# Patient Record
Sex: FEMALE | Race: WHITE | Employment: OTHER | URBAN - METROPOLITAN AREA
[De-identification: names, ages, dates, MRNs, and addresses within clinical notes are randomized per-mention and may not be internally consistent; named-entity substitution may affect disease eponyms.]

---

## 2023-05-06 ENCOUNTER — HOSPITAL ENCOUNTER (INPATIENT)
Facility: HOSPITAL | Age: 83
LOS: 3 days | Discharge: HOME | End: 2023-05-09
Attending: EMERGENCY MEDICINE | Admitting: HOSPITALIST
Payer: MEDICARE

## 2023-05-06 ENCOUNTER — APPOINTMENT (OUTPATIENT)
Facility: HOSPITAL | Age: 83
End: 2023-05-06
Payer: MEDICARE

## 2023-05-06 DIAGNOSIS — J02.9 SORE THROAT: ICD-10-CM

## 2023-05-06 DIAGNOSIS — U07.1 COVID-19: Primary | ICD-10-CM

## 2023-05-06 DIAGNOSIS — E86.0 DEHYDRATION: ICD-10-CM

## 2023-05-06 LAB
ALBUMIN SERPL-MCNC: 3.6 G/DL (ref 3.5–5)
ALBUMIN/GLOB SERPL: 0.9 (ref 1.1–2.2)
ALP SERPL-CCNC: 40 U/L (ref 45–117)
ALT SERPL-CCNC: 27 U/L (ref 12–78)
ANION GAP SERPL CALC-SCNC: 4 MMOL/L (ref 5–15)
APPEARANCE UR: CLEAR
AST SERPL-CCNC: 27 U/L (ref 15–37)
BACTERIA URNS QL MICRO: NEGATIVE /HPF
BASOPHILS # BLD: 0 K/UL (ref 0–0.1)
BASOPHILS NFR BLD: 0 % (ref 0–1)
BILIRUB SERPL-MCNC: 0.5 MG/DL (ref 0.2–1)
BILIRUB UR QL: NEGATIVE
BUN SERPL-MCNC: 24 MG/DL (ref 6–20)
BUN/CREAT SERPL: 20 (ref 12–20)
CALCIUM SERPL-MCNC: 9.6 MG/DL (ref 8.5–10.1)
CHLORIDE SERPL-SCNC: 102 MMOL/L (ref 97–108)
CO2 SERPL-SCNC: 27 MMOL/L (ref 21–32)
COLOR UR: ABNORMAL
COMMENT:: NORMAL
CREAT SERPL-MCNC: 1.2 MG/DL (ref 0.55–1.02)
DIFFERENTIAL METHOD BLD: NORMAL
EOSINOPHIL # BLD: 0 K/UL (ref 0–0.4)
EOSINOPHIL NFR BLD: 0 % (ref 0–7)
EPITH CASTS URNS QL MICRO: ABNORMAL /LPF
ERYTHROCYTE [DISTWIDTH] IN BLOOD BY AUTOMATED COUNT: 12.7 % (ref 11.5–14.5)
GLOBULIN SER CALC-MCNC: 3.9 G/DL (ref 2–4)
GLUCOSE SERPL-MCNC: 92 MG/DL (ref 65–100)
GLUCOSE UR STRIP.AUTO-MCNC: NEGATIVE MG/DL
HCT VFR BLD AUTO: 39.5 % (ref 35–47)
HGB BLD-MCNC: 12.9 G/DL (ref 11.5–16)
HGB UR QL STRIP: ABNORMAL
HYALINE CASTS URNS QL MICRO: ABNORMAL /LPF (ref 0–5)
IMM GRANULOCYTES # BLD AUTO: 0 K/UL (ref 0–0.04)
IMM GRANULOCYTES NFR BLD AUTO: 0 % (ref 0–0.5)
KETONES UR QL STRIP.AUTO: NEGATIVE MG/DL
LEUKOCYTE ESTERASE UR QL STRIP.AUTO: ABNORMAL
LYMPHOCYTES # BLD: 1.2 K/UL (ref 0.8–3.5)
LYMPHOCYTES NFR BLD: 24 % (ref 12–49)
MAGNESIUM SERPL-MCNC: 2.4 MG/DL (ref 1.6–2.4)
MCH RBC QN AUTO: 30.7 PG (ref 26–34)
MCHC RBC AUTO-ENTMCNC: 32.7 G/DL (ref 30–36.5)
MCV RBC AUTO: 94 FL (ref 80–99)
MONOCYTES # BLD: 0.5 K/UL (ref 0–1)
MONOCYTES NFR BLD: 9 % (ref 5–13)
NEUTS SEG # BLD: 3.1 K/UL (ref 1.8–8)
NEUTS SEG NFR BLD: 67 % (ref 32–75)
NITRITE UR QL STRIP.AUTO: NEGATIVE
NRBC # BLD: 0 K/UL (ref 0–0.01)
NRBC BLD-RTO: 0 PER 100 WBC
PH UR STRIP: 6.5 (ref 5–8)
PLATELET # BLD AUTO: 177 K/UL (ref 150–400)
PMV BLD AUTO: 12.1 FL (ref 8.9–12.9)
POTASSIUM SERPL-SCNC: 3.4 MMOL/L (ref 3.5–5.1)
PROT SERPL-MCNC: 7.5 G/DL (ref 6.4–8.2)
PROT UR STRIP-MCNC: ABNORMAL MG/DL
RBC # BLD AUTO: 4.2 M/UL (ref 3.8–5.2)
RBC #/AREA URNS HPF: ABNORMAL /HPF (ref 0–5)
SODIUM SERPL-SCNC: 133 MMOL/L (ref 136–145)
SP GR UR REFRACTOMETRY: 1.02 (ref 1–1.03)
SPECIMEN HOLD: NORMAL
TROPONIN I SERPL HS-MCNC: 7 NG/L (ref 0–37)
UROBILINOGEN UR QL STRIP.AUTO: 0.2 EU/DL (ref 0.2–1)
WBC # BLD AUTO: 4.8 K/UL (ref 3.6–11)
WBC URNS QL MICRO: ABNORMAL /HPF (ref 0–4)

## 2023-05-06 PROCEDURE — 99285 EMERGENCY DEPT VISIT HI MDM: CPT

## 2023-05-06 PROCEDURE — 81001 URINALYSIS AUTO W/SCOPE: CPT

## 2023-05-06 PROCEDURE — 6370000000 HC RX 637 (ALT 250 FOR IP): Performed by: EMERGENCY MEDICINE

## 2023-05-06 PROCEDURE — 2580000003 HC RX 258: Performed by: HOSPITALIST

## 2023-05-06 PROCEDURE — 36415 COLL VENOUS BLD VENIPUNCTURE: CPT

## 2023-05-06 PROCEDURE — 1100000000 HC RM PRIVATE

## 2023-05-06 PROCEDURE — 83735 ASSAY OF MAGNESIUM: CPT

## 2023-05-06 PROCEDURE — 6360000002 HC RX W HCPCS: Performed by: HOSPITALIST

## 2023-05-06 PROCEDURE — 85025 COMPLETE CBC W/AUTO DIFF WBC: CPT

## 2023-05-06 PROCEDURE — 80053 COMPREHEN METABOLIC PANEL: CPT

## 2023-05-06 PROCEDURE — 71046 X-RAY EXAM CHEST 2 VIEWS: CPT

## 2023-05-06 PROCEDURE — 94762 N-INVAS EAR/PLS OXIMTRY CONT: CPT

## 2023-05-06 PROCEDURE — 2580000003 HC RX 258: Performed by: EMERGENCY MEDICINE

## 2023-05-06 PROCEDURE — 84484 ASSAY OF TROPONIN QUANT: CPT

## 2023-05-06 RX ORDER — SODIUM CHLORIDE 9 MG/ML
INJECTION, SOLUTION INTRAVENOUS ONCE
Status: COMPLETED | OUTPATIENT
Start: 2023-05-06 | End: 2023-05-06

## 2023-05-06 RX ORDER — POLYETHYLENE GLYCOL 3350 17 G/17G
17 POWDER, FOR SOLUTION ORAL DAILY PRN
Status: DISCONTINUED | OUTPATIENT
Start: 2023-05-06 | End: 2023-05-09 | Stop reason: HOSPADM

## 2023-05-06 RX ORDER — TRAMADOL HYDROCHLORIDE 50 MG/1
50 TABLET ORAL EVERY 6 HOURS PRN
Status: DISCONTINUED | OUTPATIENT
Start: 2023-05-06 | End: 2023-05-09 | Stop reason: HOSPADM

## 2023-05-06 RX ORDER — SODIUM CHLORIDE 9 MG/ML
INJECTION, SOLUTION INTRAVENOUS PRN
Status: DISCONTINUED | OUTPATIENT
Start: 2023-05-06 | End: 2023-05-09 | Stop reason: HOSPADM

## 2023-05-06 RX ORDER — ONDANSETRON 4 MG/1
4 TABLET, ORALLY DISINTEGRATING ORAL EVERY 8 HOURS PRN
Status: DISCONTINUED | OUTPATIENT
Start: 2023-05-06 | End: 2023-05-09 | Stop reason: HOSPADM

## 2023-05-06 RX ORDER — ACETAMINOPHEN 650 MG/1
650 SUPPOSITORY RECTAL EVERY 6 HOURS PRN
Status: DISCONTINUED | OUTPATIENT
Start: 2023-05-06 | End: 2023-05-09 | Stop reason: HOSPADM

## 2023-05-06 RX ORDER — ONDANSETRON 2 MG/ML
4 INJECTION INTRAMUSCULAR; INTRAVENOUS EVERY 6 HOURS PRN
Status: DISCONTINUED | OUTPATIENT
Start: 2023-05-06 | End: 2023-05-09 | Stop reason: HOSPADM

## 2023-05-06 RX ORDER — ACETAMINOPHEN 325 MG/1
650 TABLET ORAL EVERY 6 HOURS PRN
Status: DISCONTINUED | OUTPATIENT
Start: 2023-05-06 | End: 2023-05-09 | Stop reason: HOSPADM

## 2023-05-06 RX ORDER — LEVOTHYROXINE SODIUM 0.05 MG/1
25 TABLET ORAL EVERY OTHER DAY
Status: DISCONTINUED | OUTPATIENT
Start: 2023-05-07 | End: 2023-05-09 | Stop reason: HOSPADM

## 2023-05-06 RX ORDER — ACETAMINOPHEN 325 MG/1
650 TABLET ORAL EVERY 4 HOURS PRN
Status: DISCONTINUED | OUTPATIENT
Start: 2023-05-06 | End: 2023-05-08 | Stop reason: SDUPTHER

## 2023-05-06 RX ORDER — HEPARIN SODIUM 5000 [USP'U]/ML
5000 INJECTION, SOLUTION INTRAVENOUS; SUBCUTANEOUS 2 TIMES DAILY
Status: DISCONTINUED | OUTPATIENT
Start: 2023-05-06 | End: 2023-05-09 | Stop reason: HOSPADM

## 2023-05-06 RX ORDER — LEVOTHYROXINE SODIUM 0.05 MG/1
50 TABLET ORAL EVERY OTHER DAY
Status: DISCONTINUED | OUTPATIENT
Start: 2023-05-08 | End: 2023-05-09 | Stop reason: HOSPADM

## 2023-05-06 RX ORDER — SODIUM CHLORIDE 0.9 % (FLUSH) 0.9 %
5-40 SYRINGE (ML) INJECTION EVERY 12 HOURS SCHEDULED
Status: DISCONTINUED | OUTPATIENT
Start: 2023-05-06 | End: 2023-05-09 | Stop reason: HOSPADM

## 2023-05-06 RX ORDER — SODIUM CHLORIDE AND POTASSIUM CHLORIDE 150; 900 MG/100ML; MG/100ML
INJECTION, SOLUTION INTRAVENOUS CONTINUOUS
Status: DISPENSED | OUTPATIENT
Start: 2023-05-06 | End: 2023-05-07

## 2023-05-06 RX ORDER — SODIUM CHLORIDE 0.9 % (FLUSH) 0.9 %
5-40 SYRINGE (ML) INJECTION PRN
Status: DISCONTINUED | OUTPATIENT
Start: 2023-05-06 | End: 2023-05-09 | Stop reason: HOSPADM

## 2023-05-06 RX ADMIN — ACETAMINOPHEN 650 MG: 325 TABLET ORAL at 19:48

## 2023-05-06 RX ADMIN — SODIUM CHLORIDE: 9 INJECTION, SOLUTION INTRAVENOUS at 12:10

## 2023-05-06 RX ADMIN — POTASSIUM CHLORIDE AND SODIUM CHLORIDE: 900; 150 INJECTION, SOLUTION INTRAVENOUS at 17:57

## 2023-05-06 RX ADMIN — Medication 10 ML: at 21:01

## 2023-05-06 ASSESSMENT — PAIN SCALES - GENERAL: PAINLEVEL_OUTOF10: 6

## 2023-05-06 ASSESSMENT — PAIN DESCRIPTION - LOCATION: LOCATION: THROAT

## 2023-05-07 LAB
ANION GAP SERPL CALC-SCNC: 3 MMOL/L (ref 5–15)
BUN SERPL-MCNC: 18 MG/DL (ref 6–20)
BUN/CREAT SERPL: 17 (ref 12–20)
CALCIUM SERPL-MCNC: 8.3 MG/DL (ref 8.5–10.1)
CHLORIDE SERPL-SCNC: 109 MMOL/L (ref 97–108)
CO2 SERPL-SCNC: 24 MMOL/L (ref 21–32)
CREAT SERPL-MCNC: 1.09 MG/DL (ref 0.55–1.02)
ERYTHROCYTE [DISTWIDTH] IN BLOOD BY AUTOMATED COUNT: 12.7 % (ref 11.5–14.5)
GLUCOSE SERPL-MCNC: 88 MG/DL (ref 65–100)
HCT VFR BLD AUTO: 35.5 % (ref 35–47)
HGB BLD-MCNC: 11.7 G/DL (ref 11.5–16)
MAGNESIUM SERPL-MCNC: 1.8 MG/DL (ref 1.6–2.4)
MCH RBC QN AUTO: 31 PG (ref 26–34)
MCHC RBC AUTO-ENTMCNC: 33 G/DL (ref 30–36.5)
MCV RBC AUTO: 93.9 FL (ref 80–99)
NRBC # BLD: 0 K/UL (ref 0–0.01)
NRBC BLD-RTO: 0 PER 100 WBC
PLATELET # BLD AUTO: 173 K/UL (ref 150–400)
PMV BLD AUTO: 11.8 FL (ref 8.9–12.9)
POTASSIUM SERPL-SCNC: 3.7 MMOL/L (ref 3.5–5.1)
RBC # BLD AUTO: 3.78 M/UL (ref 3.8–5.2)
SODIUM SERPL-SCNC: 136 MMOL/L (ref 136–145)
WBC # BLD AUTO: 3.9 K/UL (ref 3.6–11)

## 2023-05-07 PROCEDURE — 83735 ASSAY OF MAGNESIUM: CPT

## 2023-05-07 PROCEDURE — 6360000002 HC RX W HCPCS: Performed by: HOSPITALIST

## 2023-05-07 PROCEDURE — 1100000000 HC RM PRIVATE

## 2023-05-07 PROCEDURE — 80048 BASIC METABOLIC PNL TOTAL CA: CPT

## 2023-05-07 PROCEDURE — 36415 COLL VENOUS BLD VENIPUNCTURE: CPT

## 2023-05-07 PROCEDURE — 6370000000 HC RX 637 (ALT 250 FOR IP): Performed by: HOSPITALIST

## 2023-05-07 PROCEDURE — 2580000003 HC RX 258: Performed by: HOSPITALIST

## 2023-05-07 PROCEDURE — 85027 COMPLETE CBC AUTOMATED: CPT

## 2023-05-07 RX ORDER — SODIUM CHLORIDE AND POTASSIUM CHLORIDE 150; 900 MG/100ML; MG/100ML
INJECTION, SOLUTION INTRAVENOUS CONTINUOUS
Status: DISPENSED | OUTPATIENT
Start: 2023-05-07 | End: 2023-05-08

## 2023-05-07 RX ORDER — NIRMATRELVIR AND RITONAVIR 150-100 MG
2 KIT ORAL EVERY 12 HOURS
COMMUNITY

## 2023-05-07 RX ADMIN — POTASSIUM CHLORIDE AND SODIUM CHLORIDE: 900; 150 INJECTION, SOLUTION INTRAVENOUS at 04:43

## 2023-05-07 RX ADMIN — LEVOTHYROXINE SODIUM 25 MCG: 0.05 TABLET ORAL at 08:32

## 2023-05-07 RX ADMIN — POTASSIUM CHLORIDE AND SODIUM CHLORIDE: 900; 150 INJECTION, SOLUTION INTRAVENOUS at 15:10

## 2023-05-07 RX ADMIN — POTASSIUM CHLORIDE AND SODIUM CHLORIDE: 900; 150 INJECTION, SOLUTION INTRAVENOUS at 17:43

## 2023-05-07 RX ADMIN — Medication 10 ML: at 22:22

## 2023-05-08 LAB
EKG ATRIAL RATE: 59 BPM
EKG DIAGNOSIS: NORMAL
EKG P AXIS: 81 DEGREES
EKG P-R INTERVAL: 122 MS
EKG Q-T INTERVAL: 430 MS
EKG QRS DURATION: 74 MS
EKG QTC CALCULATION (BAZETT): 425 MS
EKG R AXIS: 78 DEGREES
EKG T AXIS: 74 DEGREES
EKG VENTRICULAR RATE: 59 BPM

## 2023-05-08 PROCEDURE — 1100000000 HC RM PRIVATE

## 2023-05-08 PROCEDURE — 6360000002 HC RX W HCPCS: Performed by: HOSPITALIST

## 2023-05-08 PROCEDURE — 6370000000 HC RX 637 (ALT 250 FOR IP): Performed by: HOSPITALIST

## 2023-05-08 PROCEDURE — 97161 PT EVAL LOW COMPLEX 20 MIN: CPT

## 2023-05-08 PROCEDURE — 2580000003 HC RX 258: Performed by: HOSPITALIST

## 2023-05-08 RX ORDER — SODIUM CHLORIDE AND POTASSIUM CHLORIDE 150; 900 MG/100ML; MG/100ML
INJECTION, SOLUTION INTRAVENOUS CONTINUOUS
Status: DISCONTINUED | OUTPATIENT
Start: 2023-05-08 | End: 2023-05-09 | Stop reason: HOSPADM

## 2023-05-08 RX ADMIN — POTASSIUM CHLORIDE AND SODIUM CHLORIDE: 900; 150 INJECTION, SOLUTION INTRAVENOUS at 07:03

## 2023-05-08 RX ADMIN — POTASSIUM CHLORIDE AND SODIUM CHLORIDE: 900; 150 INJECTION, SOLUTION INTRAVENOUS at 20:23

## 2023-05-08 RX ADMIN — Medication 10 ML: at 20:27

## 2023-05-08 RX ADMIN — LEVOTHYROXINE SODIUM 50 MCG: 0.05 TABLET ORAL at 09:24

## 2023-05-08 NOTE — PROGRESS NOTES
Comprehensive Nutrition Assessment    Type and Reason for Visit:  Initial (low BMI)    Nutrition Recommendations/Plan:   Continue regular diet  Refused ons- added high protein snacks      Malnutrition Assessment:  Malnutrition Status:  Severe malnutrition (05/08/23 1329)    Context:  Acute Illness     Findings of the 6 clinical characteristics of malnutrition:  Energy Intake:  75% or less of estimated energy requirements for 7 or more days  Weight Loss:  Greater than 5% over 1 month     Body Fat Loss: Moderate body fat loss Orbital, Triceps   Muscle Mass Loss: Moderate muscle mass loss Temples (temporalis), Calf (gastrocnemius)  Fluid Accumulation:  No significant fluid accumulation     Strength:  Not Performed    Nutrition Assessment:    PMHx:  s/p total colectomy with ileostomy    80 y.o. female admitted with dehydration and COVID 19. On IVF. Visited pt at bedside for low BMI. She stated UBW is ~82#. Current wt 75#. She is aware of acute weight loss over the last week or so d/t poor appetite from COVID 19 infection. Wt loss is considered significant for time frame. She has very little fat or muscle mass. Meets ASPEN criteria for severe acute malnutrition tho suspect her malnutrition is also chronic in nature. No n/v/c/d. No problems with chewing/swallowing. On a regular diet. We talked about increased nutrient needs in context of low BMI and recent weight loss and addition of snacks between meals and before bedtime, all with protein and fat. She does not like ensure or other ons but is agreeable to receive yogurt and milk on her trays with tuna salad as a snack. Labs reviewed. Medications:  Synthroid    Nutrition Related Findings:      Wound Type: None     Edema: No data recorded      Current Nutrition Intake & Therapies:    Average Meal Intake: 51-75%  Average Supplements Intake: None Ordered  ADULT DIET; Regular  DIET ONE TIME MESSAGE;     Anthropometric Measures:  Height: 5' (152.4 cm)  Ideal Body

## 2023-05-08 NOTE — PROGRESS NOTES
Physical Therapy  PHYSICAL THERAPY EVALUATION/DISCHARGE    Patient: Marianela Naidu (2 y.o. female)  Date: 5/8/2023  Primary Diagnosis: Dehydration [E86.0]  Sore throat [J02.9]  COVID-19 virus infection [U07.1]  COVID-19 [U07.1]       Precautions:  ,  ,  ,  ,  ,  ,  ,        ASSESSMENT AND RECOMMENDATIONS:  Based on the objective data below, the patient is independent with all mobility. She demonstrates good balance, strength, ROM and gait. She did request recommendations for activity and educated her on mobilizing frequently during the day and doing her standing exercises she does at home. She states no issues with pain or breathing and has been mobilizing on her own managing IV pole. At this time, no needs at discharge. Functional Outcome Measure: The patient scored 24/24 on the [facility] outcome measure which is indicative of no needs at discharge. Patient does not require further skilled physical therapy intervention at this level of care. PLAN :  Recommendation for discharge: (in order for the patient to meet his/her long term goals): No skilled physical therapy    Other factors to consider for discharge: lives alone    IF patient discharges home will need the following DME: none       SUBJECTIVE:   Patient stated I am very active. I do yoga, palates. Becky Erie    OBJECTIVE DATA SUMMARY:   History reviewed. No pertinent past medical history.   Past Surgical History:   Procedure Laterality Date    COLECTOMY      JOINT REPLACEMENT      2019       Home Situation:  Social/Functional History  Lives With: Alone  Type of Home: House  Home Layout: Two level  Home Access: Stairs to enter with rails  Entrance Stairs - Number of Steps: 1  Bathroom Shower/Tub: Walk-in shower  Active : Yes  Mode of Transportation: Car  Occupation: Retired  Critical Behavior:  Orientation  Overall Orientation Status: Within Normal Limits  Orientation Level: Oriented X4  Cognition  Overall Cognitive Status: WNL    Hearing:

## 2023-05-08 NOTE — PLAN OF CARE
Problem: Pain  Goal: Verbalizes/displays adequate comfort level or baseline comfort level  Outcome: Progressing  Flowsheets (Taken 5/8/2023 0044)  Verbalizes/displays adequate comfort level or baseline comfort level:   Encourage patient to monitor pain and request assistance   Assess pain using appropriate pain scale     Problem: Safety - Adult  Goal: Free from fall injury  Outcome: Progressing

## 2023-05-08 NOTE — PROGRESS NOTES
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 She was diagnosed with COVID-19 4 days ago. Her symptoms have been severe myalgias and fatigue, followed by sore throat. Her PO intake has been poor and she feels dehydrated. She cannot take much fluids. She denies abdominal pain, dyspnea, n/v. She has a history of UC s/p total colectomy and an ileostomy and notes that she is very prone to dehydration. 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  -Progressing well, BP a little bit lower today than yesterday. Would like to continue IV hydration overnight  -PT evaluated, no therapy needs. Assessment & Plan:     COVID-19 infection fairly asymptomatic without hypoxia, normal chest x-ray  Dehydration weakness due to the acute viral illness  -Continue IV fluids. Advance diet as tolerated. Home Paxlovid reordered per patient request.  -No indication for COVID-specific therapy  -s/s of pharyngitis monitor for evidence of superimposed bacterial infection     Mild hyponatremia hypokalemia, replaced and corrected. Hypothyroidism:  -continue home synthroid     Continue IV hydration tonight, anticipate discharge tomorrow. Code status: Full code  Prophylaxis: Heparin  Care Plan discussed with: Patient  Anticipated Disposition: Home tomorrow             Review of Systems:   Pertinent items are noted in HPI. Vital Signs:    Last 24hrs VS reviewed since prior progress note.  Most recent are:  Vitals:    23 1430   BP: (!) 97/57   Pulse: 60   Resp: 16   Temp: 97.6 °F (36.4 °C)   SpO2: 98%       No intake or output data in the 24 hours

## 2023-05-09 VITALS
OXYGEN SATURATION: 97 % | SYSTOLIC BLOOD PRESSURE: 113 MMHG | BODY MASS INDEX: 14.76 KG/M2 | WEIGHT: 75.18 LBS | DIASTOLIC BLOOD PRESSURE: 69 MMHG | HEIGHT: 60 IN | TEMPERATURE: 98.5 F | HEART RATE: 60 BPM | RESPIRATION RATE: 16 BRPM

## 2023-05-09 LAB
ANION GAP SERPL CALC-SCNC: 2 MMOL/L (ref 5–15)
BUN SERPL-MCNC: 19 MG/DL (ref 6–20)
BUN/CREAT SERPL: 20 (ref 12–20)
CALCIUM SERPL-MCNC: 8.1 MG/DL (ref 8.5–10.1)
CHLORIDE SERPL-SCNC: 116 MMOL/L (ref 97–108)
CO2 SERPL-SCNC: 22 MMOL/L (ref 21–32)
CREAT SERPL-MCNC: 0.94 MG/DL (ref 0.55–1.02)
GLUCOSE SERPL-MCNC: 87 MG/DL (ref 65–100)
POTASSIUM SERPL-SCNC: 4.1 MMOL/L (ref 3.5–5.1)
SODIUM SERPL-SCNC: 140 MMOL/L (ref 136–145)

## 2023-05-09 PROCEDURE — 80048 BASIC METABOLIC PNL TOTAL CA: CPT

## 2023-05-09 PROCEDURE — 6370000000 HC RX 637 (ALT 250 FOR IP): Performed by: HOSPITALIST

## 2023-05-09 PROCEDURE — 36415 COLL VENOUS BLD VENIPUNCTURE: CPT

## 2023-05-09 RX ORDER — LEVOTHYROXINE SODIUM 25 MCG
TABLET ORAL
COMMUNITY
Start: 2023-03-02

## 2023-05-09 RX ORDER — SIMVASTATIN 10 MG
10 TABLET ORAL DAILY
COMMUNITY
Start: 2023-04-24

## 2023-05-09 RX ADMIN — LEVOTHYROXINE SODIUM 25 MCG: 0.05 TABLET ORAL at 08:58

## 2023-05-09 NOTE — DISCHARGE INSTRUCTIONS
SNF/Inpatient Rehab/LTAC    Independent/assisted living    Hospice    Other:         Signed:    Tyrone Harp MD  5/9/2023  11:30 AM

## 2023-05-09 NOTE — DISCHARGE SUMMARY
Discharge Summary       PATIENT ID: Keshawn Gallegos  MRN: [id number]   YOB: 1940    DATE OF ADMISSION: 5/6/2023 11:32 AM    DATE OF DISCHARGE: 05/09/23     PRIMARY CARE PROVIDER: Tania Solis MD     ATTENDING PHYSICIAN: Newton Kilpatrick MD    DISCHARGING PROVIDER: Newton Kilpatrick MD    To contact this individual call [phone] and ask the  to page. If unavailable ask to be transferred the Adult Hospitalist Department. CONSULTATIONS: IP CONSULT TO PHYSICAL THERAPY    PROCEDURES/SURGERIES: * No surgery found *     [address] COURSE:   COVID-19 infection fairly asymptomatic without hypoxia, normal chest x-ray  Dehydration weakness due to the acute viral illness  -Improved with IV fluids.  -No indication for COVID-specific therapy. She was on Paxlovid PTA, wanted to continue.  -s/s of pharyngitis monitor for evidence of superimposed bacterial infection     Mild hyponatremia hypokalemia, replaced and corrected. Hypothyroidism:  -continue home synthroid    Severe malnutrition. Seen by nutritionist       Discharged home. No therapy needs          PENDING TEST RESULTS:   At the time of discharge the following test results are still pending: None    FOLLOW UP APPOINTMENTS:    No follow-ups on file. ADDITIONAL CARE RECOMMENDATIONS:     DIET: regular diet    ACTIVITY: activity as tolerated    WOUND CARE: Not applicable    EQUIPMENT needed: N/A      DISCHARGE MEDICATIONS:     Medication List        CONTINUE taking these medications      paxlovid (150/100) 10 x 150 MG & 10 x 100MG Tbpk  Generic drug: nirmatrelvir/ritonavir     Synthroid 25 MCG tablet  Generic drug: levothyroxine            ASK your doctor about these medications      simvastatin 10 MG tablet  Commonly known as: ZOCOR                NOTIFY YOUR PHYSICIAN FOR ANY OF THE FOLLOWING:   Fever over 101 degrees for 24 hours.    Chest pain, shortness of breath, fever, chills, nausea, vomiting, diarrhea, change in

## 2023-05-09 NOTE — PROGRESS NOTES
Occupational Therapy:  05/09/2023    OT order received and acknowledged. Chart reviewed, in preparation for OT eval, spoke with PT who stated pt is independent and at baseline. No OT needs at this time. If status of pt changes please re consult.      Thank You,  Boo Mclain OTD, OTR/L

## 2023-05-11 NOTE — PROGRESS NOTES
Physician Progress Note      PATIENT:               Christopher Beckford  CSN #:                  [id number]  :                       1940  ADMIT DATE:       2023 11:32 AM  DISCH DATE:        2023 1:37 PM  RESPONDING  PROVIDER #:        Tonny Fernandes MD          QUERY TEXT:    Patient admitted with Covid-19 and dehydration. Noted to have documentation of   weight loss, dietician assessment with moderate body fat and muscle mass   loss. If possible, please document in progress notes and discharge summary if   you are evaluating and /or treating any of the following: The medical record reflects the following:  Risk Factors: elderly, poor appetite    Clinical Indicators:  BMI 14.7  RD Malnutrition Assessment:  Malnutrition Status:  Severe malnutrition (23 1329)  Context:  Acute Illness  Findings of the 6 clinical characteristics of malnutrition:  Energy Intake:  75% or less of estimated energy requirements for 7 or more   days  Weight Loss:  Greater than 5% over 1 month  Body Fat Loss: Moderate body fat loss Orbital, Triceps  Muscle Mass Loss: Moderate muscle mass loss Temples (temporalis), Calf   (gastrocnemius)  Fluid Accumulation:  No significant fluid accumulation   Strength:  Not Performed  She is aware of acute weight loss over the last week or so d/t poor appetite   from COVID 19 infection. Wt loss is considered significant for time frame. She   has very little fat or muscle mass  Nutrition Diagnosis:  ? Severe malnutrition, In context of acute illness or injury related to   inadequate protein-energy intake as evidenced by Criteria as identified in   malnutrition assessment    Treatment: cont regular diet, high protein snacks added- pt refuses ONS, RD to   continue to monitor    ASPEN Criteria:    https://aspenjournals. onlinelibrary. denney. com/doi/full/10.1177/067682859023725  5    Thank you,  Taylor Oropeza RN  Clinical Documentation  [phone], or via [employer] [facility] Se

## 2023-07-13 PROBLEM — N18.32 STAGE 3B CHRONIC KIDNEY DISEASE (HCC): Status: ACTIVE | Noted: 2023-07-13

## 2023-07-13 PROBLEM — E86.0 DEHYDRATION: Status: ACTIVE | Noted: 2023-07-13

## 2023-07-13 RX ORDER — HEPARIN 100 UNIT/ML
500 SYRINGE INTRAVENOUS PRN
Status: CANCELLED | OUTPATIENT
Start: 2023-07-14

## 2023-07-13 RX ORDER — SODIUM CHLORIDE 9 MG/ML
5-250 INJECTION, SOLUTION INTRAVENOUS PRN
Status: CANCELLED | OUTPATIENT
Start: 2023-07-14

## 2023-07-14 ENCOUNTER — HOSPITAL ENCOUNTER (OUTPATIENT)
Facility: HOSPITAL | Age: 83
Setting detail: INFUSION SERIES
End: 2023-07-14
Payer: MEDICARE

## 2023-07-14 VITALS
RESPIRATION RATE: 16 BRPM | DIASTOLIC BLOOD PRESSURE: 59 MMHG | HEIGHT: 61 IN | TEMPERATURE: 97.7 F | WEIGHT: 81.9 LBS | SYSTOLIC BLOOD PRESSURE: 125 MMHG | OXYGEN SATURATION: 100 % | HEART RATE: 59 BPM | BODY MASS INDEX: 15.46 KG/M2

## 2023-07-14 DIAGNOSIS — E86.0 DEHYDRATION: Primary | ICD-10-CM

## 2023-07-14 DIAGNOSIS — N18.32 STAGE 3B CHRONIC KIDNEY DISEASE (HCC): ICD-10-CM

## 2023-07-14 PROBLEM — Z96.659: Status: ACTIVE | Noted: 2020-12-22

## 2023-07-14 PROBLEM — F32.A DEPRESSION: Status: ACTIVE | Noted: 2019-11-07

## 2023-07-14 PROBLEM — R92.2 DENSE BREAST: Status: ACTIVE | Noted: 2017-11-21

## 2023-07-14 PROBLEM — E03.9 HYPOTHYROIDISM: Status: ACTIVE | Noted: 2019-11-07

## 2023-07-14 PROBLEM — T84.9XXA: Status: ACTIVE | Noted: 2020-12-22

## 2023-07-14 PROBLEM — M17.11 PRIMARY OSTEOARTHRITIS OF RIGHT KNEE: Status: ACTIVE | Noted: 2019-11-07

## 2023-07-14 PROBLEM — K51.90 ULCERATIVE COLITIS (HCC): Status: ACTIVE | Noted: 2022-07-03

## 2023-07-14 PROBLEM — M81.0 OSTEOPOROSIS: Status: ACTIVE | Noted: 2023-07-14

## 2023-07-14 PROBLEM — J30.9 ALLERGIC RHINITIS: Status: ACTIVE | Noted: 2023-07-14

## 2023-07-14 PROBLEM — R63.6 UNDERWEIGHT: Status: ACTIVE | Noted: 2020-12-20

## 2023-07-14 PROBLEM — Z96.651 STATUS POST TOTAL RIGHT KNEE REPLACEMENT: Status: ACTIVE | Noted: 2019-12-31

## 2023-07-14 PROBLEM — K91.850 ILEAL POUCHITIS (HCC): Status: ACTIVE | Noted: 2020-12-22

## 2023-07-14 PROBLEM — N28.1 RENAL CYST, ACQUIRED, RIGHT: Status: ACTIVE | Noted: 2020-12-20

## 2023-07-14 PROBLEM — F41.1 GENERALIZED ANXIETY DISORDER: Status: ACTIVE | Noted: 2023-04-24

## 2023-07-14 PROBLEM — R92.30 DENSE BREAST: Status: ACTIVE | Noted: 2017-11-21

## 2023-07-14 PROBLEM — I95.1 ORTHOSTASIS: Status: ACTIVE | Noted: 2021-01-26

## 2023-07-14 PROBLEM — I49.3 PVC'S (PREMATURE VENTRICULAR CONTRACTIONS): Status: ACTIVE | Noted: 2021-04-07

## 2023-07-14 PROBLEM — E78.5 HYPERLIPIDEMIA: Status: ACTIVE | Noted: 2023-07-14

## 2023-07-14 PROBLEM — R31.29 MICROSCOPIC HEMATURIA: Status: ACTIVE | Noted: 2023-07-14

## 2023-07-14 PROBLEM — G47.9 SLEEP DISTURBANCE: Status: ACTIVE | Noted: 2019-11-20

## 2023-07-14 PROBLEM — F41.1 ANXIETY STATE: Status: ACTIVE | Noted: 2020-12-20

## 2023-07-14 PROCEDURE — 96360 HYDRATION IV INFUSION INIT: CPT

## 2023-07-14 PROCEDURE — 2580000003 HC RX 258: Performed by: INTERNAL MEDICINE

## 2023-07-14 PROCEDURE — 96361 HYDRATE IV INFUSION ADD-ON: CPT

## 2023-07-14 RX ORDER — ESCITALOPRAM OXALATE 20 MG/1
TABLET ORAL
COMMUNITY
Start: 2023-04-24

## 2023-07-14 RX ORDER — DEXTROSE AND SODIUM CHLORIDE 5; .45 G/100ML; G/100ML
1000 INJECTION, SOLUTION INTRAVENOUS ONCE
Status: COMPLETED | OUTPATIENT
Start: 2023-07-14 | End: 2023-07-14

## 2023-07-14 RX ORDER — HEPARIN 100 UNIT/ML
500 SYRINGE INTRAVENOUS PRN
Status: CANCELLED | OUTPATIENT
Start: 2023-07-28

## 2023-07-14 RX ORDER — SODIUM CHLORIDE 0.9 % (FLUSH) 0.9 %
5-40 SYRINGE (ML) INJECTION PRN
Status: DISCONTINUED | OUTPATIENT
Start: 2023-07-14 | End: 2023-07-15 | Stop reason: HOSPADM

## 2023-07-14 RX ORDER — CETIRIZINE HYDROCHLORIDE 10 MG/1
10 TABLET ORAL DAILY PRN
COMMUNITY

## 2023-07-14 RX ORDER — CHOLESTYRAMINE 4 G/9G
POWDER, FOR SUSPENSION ORAL
COMMUNITY
Start: 2023-07-11

## 2023-07-14 RX ORDER — SODIUM CHLORIDE 0.9 % (FLUSH) 0.9 %
5-40 SYRINGE (ML) INJECTION PRN
Status: CANCELLED | OUTPATIENT
Start: 2023-07-28

## 2023-07-14 RX ORDER — DENOSUMAB 60 MG/ML
INJECTION SUBCUTANEOUS
COMMUNITY
Start: 2023-03-24

## 2023-07-14 RX ORDER — LORAZEPAM 0.5 MG/1
TABLET ORAL
COMMUNITY
Start: 2021-06-07

## 2023-07-14 RX ORDER — ESTRADIOL 0.1 MG/G
CREAM VAGINAL
COMMUNITY

## 2023-07-14 RX ORDER — SODIUM CHLORIDE 9 MG/ML
5-250 INJECTION, SOLUTION INTRAVENOUS PRN
Status: CANCELLED | OUTPATIENT
Start: 2023-07-28

## 2023-07-14 RX ORDER — DEXTROSE AND SODIUM CHLORIDE 5; .45 G/100ML; G/100ML
1000 INJECTION, SOLUTION INTRAVENOUS ONCE
Status: CANCELLED
Start: 2023-07-28 | End: 2023-07-28

## 2023-07-14 RX ADMIN — Medication 10 ML: at 09:35

## 2023-07-14 RX ADMIN — DEXTROSE AND SODIUM CHLORIDE 1000 ML: 5; 450 INJECTION, SOLUTION INTRAVENOUS at 09:36

## 2023-07-14 RX ADMIN — Medication 10 ML: at 13:38

## 2023-07-14 NOTE — DISCHARGE INSTRUCTIONS
Dehydration: Care Instructions  Your Care Instructions  Dehydration happens when your body loses too much fluid. This might happen when you do not drink enough water or you lose large amounts of fluids from your body because of diarrhea, vomiting, or sweating. Severe dehydration can be life-threatening. Water and minerals called electrolytes help put your body fluids back in balance. Learn the early signs of fluid loss, and drink more fluids to prevent dehydration. Follow-up care is a key part of your treatment and safety. Be sure to make and go to all appointments, and call your doctor if you are having problems. It's also a good idea to know your test results and keep a list of the medicines you take. How can you care for yourself at home? Drink plenty of fluids. Choose water and other clear liquids until you feel better. If you have kidney, heart, or liver disease and have to limit fluids, talk with your doctor before you increase the amount of fluids you drink. If you do not feel like eating or drinking, try taking small sips of water, sports drinks, or other rehydration drinks. Get plenty of rest.  To prevent dehydration  Add more fluids to your diet and daily routine, unless your doctor has told you not to. During hot weather, drink more fluids. Drink even more fluids if you exercise a lot. Stay away from drinks with alcohol. Watch for the symptoms of dehydration. These include:  A dry, sticky mouth. Not much urine. Dry and sunken eyes. Feeling very tired. Learn what problems can lead to dehydration. These include:  Diarrhea, fever, and vomiting. Any illness with a fever, such as pneumonia or the flu. Activities that cause heavy sweating, such as endurance races and heavy outdoor work in hot or humid weather. Certain medicines, such as cold and allergy pills (antihistamines), pills that remove water from the body (diuretics), and laxatives.   Certain diseases, such as diabetes, cancer, and

## 2023-07-25 ENCOUNTER — HOSPITAL ENCOUNTER (OUTPATIENT)
Facility: HOSPITAL | Age: 83
Setting detail: INFUSION SERIES
End: 2023-07-25
Payer: MEDICARE

## 2023-07-25 VITALS
HEART RATE: 52 BPM | RESPIRATION RATE: 16 BRPM | SYSTOLIC BLOOD PRESSURE: 100 MMHG | DIASTOLIC BLOOD PRESSURE: 52 MMHG | TEMPERATURE: 97.6 F

## 2023-07-25 DIAGNOSIS — N18.32 STAGE 3B CHRONIC KIDNEY DISEASE (HCC): ICD-10-CM

## 2023-07-25 DIAGNOSIS — E86.0 DEHYDRATION: Primary | ICD-10-CM

## 2023-07-25 PROCEDURE — 96361 HYDRATE IV INFUSION ADD-ON: CPT

## 2023-07-25 PROCEDURE — 2580000003 HC RX 258: Performed by: INTERNAL MEDICINE

## 2023-07-25 PROCEDURE — 96360 HYDRATION IV INFUSION INIT: CPT

## 2023-07-25 RX ORDER — DEXTROSE AND SODIUM CHLORIDE 5; .45 G/100ML; G/100ML
1000 INJECTION, SOLUTION INTRAVENOUS ONCE
Status: COMPLETED | OUTPATIENT
Start: 2023-07-25 | End: 2023-07-25

## 2023-07-25 RX ORDER — SODIUM CHLORIDE 0.9 % (FLUSH) 0.9 %
5-40 SYRINGE (ML) INJECTION PRN
Status: CANCELLED | OUTPATIENT
Start: 2023-08-08

## 2023-07-25 RX ORDER — SODIUM CHLORIDE 9 MG/ML
5-250 INJECTION, SOLUTION INTRAVENOUS PRN
Status: CANCELLED | OUTPATIENT
Start: 2023-08-08

## 2023-07-25 RX ORDER — DEXTROSE AND SODIUM CHLORIDE 5; .45 G/100ML; G/100ML
1000 INJECTION, SOLUTION INTRAVENOUS ONCE
Status: CANCELLED
Start: 2023-08-08 | End: 2023-08-08

## 2023-07-25 RX ORDER — HEPARIN 100 UNIT/ML
500 SYRINGE INTRAVENOUS PRN
Status: CANCELLED | OUTPATIENT
Start: 2023-08-08

## 2023-07-25 RX ADMIN — DEXTROSE AND SODIUM CHLORIDE 1000 ML: 5; 450 INJECTION, SOLUTION INTRAVENOUS at 11:31

## 2023-07-25 ASSESSMENT — PAIN SCALES - GENERAL: PAINLEVEL_OUTOF10: 0

## 2023-08-08 ENCOUNTER — HOSPITAL ENCOUNTER (OUTPATIENT)
Facility: HOSPITAL | Age: 83
Setting detail: INFUSION SERIES
End: 2023-08-08
Payer: MEDICARE

## 2023-08-08 VITALS
TEMPERATURE: 97.2 F | HEART RATE: 61 BPM | DIASTOLIC BLOOD PRESSURE: 56 MMHG | SYSTOLIC BLOOD PRESSURE: 97 MMHG | RESPIRATION RATE: 16 BRPM | OXYGEN SATURATION: 100 %

## 2023-08-08 DIAGNOSIS — E86.0 DEHYDRATION: Primary | ICD-10-CM

## 2023-08-08 DIAGNOSIS — N18.32 STAGE 3B CHRONIC KIDNEY DISEASE (HCC): ICD-10-CM

## 2023-08-08 PROCEDURE — 96360 HYDRATION IV INFUSION INIT: CPT

## 2023-08-08 PROCEDURE — 96361 HYDRATE IV INFUSION ADD-ON: CPT

## 2023-08-08 PROCEDURE — 2580000003 HC RX 258: Performed by: INTERNAL MEDICINE

## 2023-08-08 RX ORDER — DEXTROSE AND SODIUM CHLORIDE 5; .45 G/100ML; G/100ML
1000 INJECTION, SOLUTION INTRAVENOUS ONCE
Status: COMPLETED | OUTPATIENT
Start: 2023-08-08 | End: 2023-08-08

## 2023-08-08 RX ORDER — SODIUM CHLORIDE 9 MG/ML
5-250 INJECTION, SOLUTION INTRAVENOUS PRN
Status: CANCELLED | OUTPATIENT
Start: 2023-08-22

## 2023-08-08 RX ORDER — DEXTROSE AND SODIUM CHLORIDE 5; .45 G/100ML; G/100ML
1000 INJECTION, SOLUTION INTRAVENOUS ONCE
Status: CANCELLED
Start: 2023-08-22 | End: 2023-08-22

## 2023-08-08 RX ORDER — SODIUM CHLORIDE 0.9 % (FLUSH) 0.9 %
5-40 SYRINGE (ML) INJECTION PRN
Status: CANCELLED | OUTPATIENT
Start: 2023-08-22

## 2023-08-08 RX ORDER — HEPARIN 100 UNIT/ML
500 SYRINGE INTRAVENOUS PRN
Status: CANCELLED | OUTPATIENT
Start: 2023-08-22

## 2023-08-08 RX ORDER — SODIUM CHLORIDE 0.9 % (FLUSH) 0.9 %
5-40 SYRINGE (ML) INJECTION PRN
Status: DISCONTINUED | OUTPATIENT
Start: 2023-08-08 | End: 2023-08-09 | Stop reason: HOSPADM

## 2023-08-08 RX ADMIN — SODIUM CHLORIDE, PRESERVATIVE FREE 10 ML: 5 INJECTION INTRAVENOUS at 11:41

## 2023-08-08 RX ADMIN — DEXTROSE AND SODIUM CHLORIDE 1000 ML: 5; 450 INJECTION, SOLUTION INTRAVENOUS at 11:45

## 2023-08-08 NOTE — PROGRESS NOTES
Pt arrived to [facility] for  Hydration in stable condition. PIV to left wrist with good blood return; D5 1/2 NS bolus started. Vitals:    08/08/23 1602   BP: (!) 97/56   Pulse: 61   Resp:    Temp:    SpO2:      Medications Administered         dextrose 5 % and 0.45 % NaCl infusion Admin Date  08/08/2023 Action  New Bag Dose  1,000 mL Rate  250 mL/hr Route  IntraVENous Administered By  Tereza Barrera RN        sodium chloride flush 0.9 % injection 5-40 mL Admin Date  08/08/2023 Action  Given Dose  10 mL Rate   Route  IntraVENous Administered By  Tereza Barrera RN            Pt tolerated treatment well. PIV flushed and removed per protocol. D/Cd from [facility] ambulatory and in no distress.      Future Appointments   Date Time Provider [address]   [facility]2023 [facility]   9/6/2023  2:00 PM [facility]   [facility]20[facility]2023 11[facility]

## 2023-08-23 ENCOUNTER — HOSPITAL ENCOUNTER (OUTPATIENT)
Facility: HOSPITAL | Age: 83
Setting detail: INFUSION SERIES
End: 2023-08-23
Payer: MEDICARE

## 2023-08-23 VITALS
HEART RATE: 53 BPM | TEMPERATURE: 97.9 F | DIASTOLIC BLOOD PRESSURE: 51 MMHG | SYSTOLIC BLOOD PRESSURE: 92 MMHG | RESPIRATION RATE: 181 BRPM

## 2023-08-23 DIAGNOSIS — N18.32 STAGE 3B CHRONIC KIDNEY DISEASE (HCC): ICD-10-CM

## 2023-08-23 DIAGNOSIS — E86.0 DEHYDRATION: Primary | ICD-10-CM

## 2023-08-23 PROCEDURE — 96360 HYDRATION IV INFUSION INIT: CPT

## 2023-08-23 PROCEDURE — 96361 HYDRATE IV INFUSION ADD-ON: CPT

## 2023-08-23 PROCEDURE — 2580000003 HC RX 258: Performed by: INTERNAL MEDICINE

## 2023-08-23 RX ORDER — DOXYCYCLINE HYCLATE 100 MG/1
CAPSULE ORAL
COMMUNITY

## 2023-08-23 RX ORDER — SUCRALFATE 1 G/1
1 TABLET ORAL DAILY
COMMUNITY
Start: 2011-09-23

## 2023-08-23 RX ORDER — SODIUM CHLORIDE 0.9 % (FLUSH) 0.9 %
5-40 SYRINGE (ML) INJECTION PRN
Status: CANCELLED | OUTPATIENT
Start: 2023-09-05

## 2023-08-23 RX ORDER — MELOXICAM 15 MG/1
TABLET ORAL
COMMUNITY
Start: 2019-11-20

## 2023-08-23 RX ORDER — ESCITALOPRAM OXALATE 20 MG/1
1 TABLET ORAL EVERY MORNING
COMMUNITY
Start: 2018-01-20

## 2023-08-23 RX ORDER — SODIUM CHLORIDE 9 MG/ML
5-250 INJECTION, SOLUTION INTRAVENOUS PRN
Status: CANCELLED | OUTPATIENT
Start: 2023-09-05

## 2023-08-23 RX ORDER — HEPARIN 100 UNIT/ML
500 SYRINGE INTRAVENOUS PRN
Status: CANCELLED | OUTPATIENT
Start: 2023-09-05

## 2023-08-23 RX ORDER — LEVOTHYROXINE SODIUM 0.03 MG/1
TABLET ORAL
COMMUNITY
Start: 2018-01-20

## 2023-08-23 RX ORDER — SIMVASTATIN 40 MG
40 TABLET ORAL DAILY
COMMUNITY
Start: 2011-09-23

## 2023-08-23 RX ORDER — DIAZEPAM 5 MG/1
TABLET ORAL
COMMUNITY

## 2023-08-23 RX ORDER — CLOBETASOL PROPIONATE 0.5 MG/G
OINTMENT TOPICAL
COMMUNITY
Start: 2012-03-13

## 2023-08-23 RX ORDER — AMOXICILLIN 500 MG/1
CAPSULE ORAL
COMMUNITY
Start: 2020-01-31

## 2023-08-23 RX ORDER — DEXTROSE AND SODIUM CHLORIDE 5; .45 G/100ML; G/100ML
1000 INJECTION, SOLUTION INTRAVENOUS ONCE
Status: CANCELLED
Start: 2023-09-05 | End: 2023-09-05

## 2023-08-23 RX ORDER — ONDANSETRON 8 MG/1
TABLET, ORALLY DISINTEGRATING ORAL
COMMUNITY

## 2023-08-23 RX ORDER — ERGOCALCIFEROL 1.25 MG/1
3000 CAPSULE ORAL
COMMUNITY

## 2023-08-23 RX ORDER — ALENDRONATE SODIUM 70 MG/1
70 TABLET ORAL WEEKLY
COMMUNITY
Start: 2011-09-23

## 2023-08-23 RX ORDER — CHOLESTYRAMINE 4 G/9G
POWDER, FOR SUSPENSION ORAL
COMMUNITY
Start: 2023-08-04

## 2023-08-23 RX ORDER — DEXTROSE AND SODIUM CHLORIDE 5; .45 G/100ML; G/100ML
1000 INJECTION, SOLUTION INTRAVENOUS ONCE
Status: COMPLETED | OUTPATIENT
Start: 2023-08-23 | End: 2023-08-23

## 2023-08-23 RX ORDER — ASPIRIN 81 MG/1
81 TABLET ORAL DAILY
COMMUNITY
Start: 2021-04-07

## 2023-08-23 RX ORDER — ESCITALOPRAM OXALATE 10 MG/1
10 TABLET ORAL DAILY
COMMUNITY

## 2023-08-23 RX ORDER — OMEPRAZOLE 20 MG/1
1 CAPSULE, DELAYED RELEASE ORAL
COMMUNITY
Start: 2019-11-20

## 2023-08-23 RX ORDER — GABAPENTIN 300 MG/1
CAPSULE ORAL
COMMUNITY
Start: 2019-11-20

## 2023-08-23 RX ORDER — SIMVASTATIN 20 MG
TABLET ORAL
COMMUNITY
Start: 2020-12-16

## 2023-08-23 RX ORDER — SIMVASTATIN 10 MG
TABLET ORAL
COMMUNITY

## 2023-08-23 RX ORDER — ESTRADIOL 10 UG/1
1 INSERT VAGINAL
COMMUNITY
Start: 2011-09-23

## 2023-08-23 RX ADMIN — DEXTROSE AND SODIUM CHLORIDE 1000 ML: 5; 450 INJECTION, SOLUTION INTRAVENOUS at 10:48

## 2023-08-23 ASSESSMENT — PAIN SCALES - GENERAL: PAINLEVEL_OUTOF10: 0

## 2023-09-06 ENCOUNTER — HOSPITAL ENCOUNTER (OUTPATIENT)
Facility: HOSPITAL | Age: 83
Setting detail: INFUSION SERIES
End: 2023-09-06
Payer: MEDICARE

## 2023-09-06 VITALS
SYSTOLIC BLOOD PRESSURE: 87 MMHG | TEMPERATURE: 97.2 F | DIASTOLIC BLOOD PRESSURE: 45 MMHG | HEART RATE: 59 BPM | RESPIRATION RATE: 16 BRPM

## 2023-09-06 DIAGNOSIS — N18.32 STAGE 3B CHRONIC KIDNEY DISEASE (HCC): ICD-10-CM

## 2023-09-06 DIAGNOSIS — E86.0 DEHYDRATION: Primary | ICD-10-CM

## 2023-09-06 PROCEDURE — 96360 HYDRATION IV INFUSION INIT: CPT

## 2023-09-06 PROCEDURE — 96361 HYDRATE IV INFUSION ADD-ON: CPT

## 2023-09-06 PROCEDURE — 2580000003 HC RX 258: Performed by: INTERNAL MEDICINE

## 2023-09-06 RX ORDER — DEXTROSE AND SODIUM CHLORIDE 5; .45 G/100ML; G/100ML
1000 INJECTION, SOLUTION INTRAVENOUS ONCE
Status: COMPLETED | OUTPATIENT
Start: 2023-09-06 | End: 2023-09-06

## 2023-09-06 RX ORDER — SODIUM CHLORIDE 9 MG/ML
5-250 INJECTION, SOLUTION INTRAVENOUS PRN
Status: CANCELLED | OUTPATIENT
Start: 2023-09-20

## 2023-09-06 RX ORDER — SODIUM CHLORIDE 0.9 % (FLUSH) 0.9 %
5-40 SYRINGE (ML) INJECTION PRN
Status: CANCELLED | OUTPATIENT
Start: 2023-09-20

## 2023-09-06 RX ORDER — DEXTROSE AND SODIUM CHLORIDE 5; .45 G/100ML; G/100ML
1000 INJECTION, SOLUTION INTRAVENOUS ONCE
Status: CANCELLED
Start: 2023-09-20 | End: 2023-09-20

## 2023-09-06 RX ORDER — HEPARIN 100 UNIT/ML
500 SYRINGE INTRAVENOUS PRN
Status: CANCELLED | OUTPATIENT
Start: 2023-09-20

## 2023-09-06 RX ADMIN — DEXTROSE AND SODIUM CHLORIDE 1000 ML: 5; 450 INJECTION, SOLUTION INTRAVENOUS at 14:14

## 2023-09-20 ENCOUNTER — HOSPITAL ENCOUNTER (OUTPATIENT)
Facility: HOSPITAL | Age: 83
Setting detail: INFUSION SERIES
End: 2023-09-20
Payer: MEDICARE

## 2023-09-20 VITALS
RESPIRATION RATE: 17 BRPM | DIASTOLIC BLOOD PRESSURE: 60 MMHG | TEMPERATURE: 97.6 F | HEART RATE: 50 BPM | SYSTOLIC BLOOD PRESSURE: 110 MMHG

## 2023-09-20 DIAGNOSIS — E86.0 DEHYDRATION: Primary | ICD-10-CM

## 2023-09-20 DIAGNOSIS — N18.32 STAGE 3B CHRONIC KIDNEY DISEASE (HCC): ICD-10-CM

## 2023-09-20 PROCEDURE — 2580000003 HC RX 258: Performed by: INTERNAL MEDICINE

## 2023-09-20 PROCEDURE — 96361 HYDRATE IV INFUSION ADD-ON: CPT

## 2023-09-20 PROCEDURE — 96360 HYDRATION IV INFUSION INIT: CPT

## 2023-09-20 RX ORDER — SODIUM CHLORIDE 9 MG/ML
5-250 INJECTION, SOLUTION INTRAVENOUS PRN
Status: DISCONTINUED | OUTPATIENT
Start: 2023-09-20 | End: 2023-09-21 | Stop reason: HOSPADM

## 2023-09-20 RX ORDER — SODIUM CHLORIDE 9 MG/ML
5-250 INJECTION, SOLUTION INTRAVENOUS PRN
Status: CANCELLED | OUTPATIENT
Start: 2023-10-04

## 2023-09-20 RX ORDER — HEPARIN 100 UNIT/ML
500 SYRINGE INTRAVENOUS PRN
Status: CANCELLED | OUTPATIENT
Start: 2023-10-04

## 2023-09-20 RX ORDER — SODIUM CHLORIDE 0.9 % (FLUSH) 0.9 %
5-40 SYRINGE (ML) INJECTION PRN
Status: CANCELLED | OUTPATIENT
Start: 2023-10-04

## 2023-09-20 RX ORDER — SODIUM CHLORIDE 0.9 % (FLUSH) 0.9 %
5-40 SYRINGE (ML) INJECTION PRN
Status: DISCONTINUED | OUTPATIENT
Start: 2023-09-20 | End: 2023-09-21 | Stop reason: HOSPADM

## 2023-09-20 RX ORDER — DEXTROSE AND SODIUM CHLORIDE 5; .45 G/100ML; G/100ML
1000 INJECTION, SOLUTION INTRAVENOUS ONCE
Status: COMPLETED | OUTPATIENT
Start: 2023-09-20 | End: 2023-09-20

## 2023-09-20 RX ORDER — DEXTROSE AND SODIUM CHLORIDE 5; .45 G/100ML; G/100ML
1000 INJECTION, SOLUTION INTRAVENOUS ONCE
Status: CANCELLED
Start: 2023-10-04 | End: 2023-10-04

## 2023-09-20 RX ORDER — HEPARIN 100 UNIT/ML
500 SYRINGE INTRAVENOUS PRN
Status: DISCONTINUED | OUTPATIENT
Start: 2023-09-20 | End: 2023-09-21 | Stop reason: HOSPADM

## 2023-09-20 RX ADMIN — DEXTROSE AND SODIUM CHLORIDE 1000 ML: 5; 450 INJECTION, SOLUTION INTRAVENOUS at 11:28

## 2023-10-04 ENCOUNTER — HOSPITAL ENCOUNTER (OUTPATIENT)
Facility: HOSPITAL | Age: 83
Setting detail: INFUSION SERIES
End: 2023-10-04
Payer: MEDICARE

## 2023-10-04 VITALS — SYSTOLIC BLOOD PRESSURE: 120 MMHG | DIASTOLIC BLOOD PRESSURE: 53 MMHG | RESPIRATION RATE: 16 BRPM | HEART RATE: 58 BPM

## 2023-10-04 DIAGNOSIS — E86.0 DEHYDRATION: Primary | ICD-10-CM

## 2023-10-04 DIAGNOSIS — N18.32 STAGE 3B CHRONIC KIDNEY DISEASE (HCC): ICD-10-CM

## 2023-10-04 PROCEDURE — 96360 HYDRATION IV INFUSION INIT: CPT

## 2023-10-04 PROCEDURE — 96361 HYDRATE IV INFUSION ADD-ON: CPT

## 2023-10-04 PROCEDURE — 2580000003 HC RX 258: Performed by: INTERNAL MEDICINE

## 2023-10-04 RX ORDER — DEXTROSE AND SODIUM CHLORIDE 5; .45 G/100ML; G/100ML
1000 INJECTION, SOLUTION INTRAVENOUS ONCE
Status: COMPLETED | OUTPATIENT
Start: 2023-10-04 | End: 2023-10-04

## 2023-10-04 RX ORDER — SODIUM CHLORIDE 0.9 % (FLUSH) 0.9 %
5-40 SYRINGE (ML) INJECTION PRN
OUTPATIENT
Start: 2023-10-18

## 2023-10-04 RX ORDER — DEXTROSE AND SODIUM CHLORIDE 5; .45 G/100ML; G/100ML
1000 INJECTION, SOLUTION INTRAVENOUS ONCE
Status: CANCELLED
Start: 2023-10-18 | End: 2023-10-18

## 2023-10-04 RX ORDER — SODIUM CHLORIDE 9 MG/ML
5-250 INJECTION, SOLUTION INTRAVENOUS PRN
OUTPATIENT
Start: 2023-10-18

## 2023-10-04 RX ORDER — HEPARIN 100 UNIT/ML
500 SYRINGE INTRAVENOUS PRN
OUTPATIENT
Start: 2023-10-18

## 2023-10-04 RX ADMIN — DEXTROSE AND SODIUM CHLORIDE 1000 ML: 5; 450 INJECTION, SOLUTION INTRAVENOUS at 10:59

## 2023-10-04 NOTE — PROGRESS NOTES
[facility] Short Note                       Date: 2023    Name: Cuauhtemoc Rucker    MRN: [id number]         : 1940    1100 Pt admit to [facility] for Hydration ambulatory in stable condition. PIV placed in left forearm. Assessment completed. No new concerns voiced. Ms. Banks's vitals were reviewed prior to treatment. Patient Vitals for the past 12 hrs:   Pulse Resp BP   10/04/23 1045 58 16 (!) 120/53           Medications given:   Medications Administered         dextrose 5 % and 0.45 % NaCl infusion Admin Date  10/04/2023 Action  New Bag Dose  1,000 mL Rate  250 mL/hr Route  IntraVENous Administered By  Zane Kelly RN                Pt tolerated treatment well. D/c home ambulatory in no distress. Pt aware of next appointment scheduled.     Future Appointments   Date Time Provider [address]   10/18/2023 11:00 AM G1 VIET AN [facility]   2023 11:00 AM H2 VIET AN [facility]   11/15/2023 11:00 AM G2 VIET [facility]   2023 11:00 AM G1 VIET AN [facility]       Zane Kelly RN  2023  3:12 PM

## 2023-10-18 ENCOUNTER — HOSPITAL ENCOUNTER (OUTPATIENT)
Facility: HOSPITAL | Age: 83
Setting detail: INFUSION SERIES
End: 2023-10-18
Payer: MEDICARE

## 2023-10-18 VITALS
SYSTOLIC BLOOD PRESSURE: 123 MMHG | TEMPERATURE: 97.7 F | HEART RATE: 59 BPM | DIASTOLIC BLOOD PRESSURE: 61 MMHG | RESPIRATION RATE: 16 BRPM

## 2023-10-18 DIAGNOSIS — E86.0 DEHYDRATION: Primary | ICD-10-CM

## 2023-10-18 DIAGNOSIS — N18.32 STAGE 3B CHRONIC KIDNEY DISEASE (HCC): ICD-10-CM

## 2023-10-18 PROCEDURE — 96360 HYDRATION IV INFUSION INIT: CPT

## 2023-10-18 PROCEDURE — 96361 HYDRATE IV INFUSION ADD-ON: CPT

## 2023-10-18 PROCEDURE — 2580000003 HC RX 258: Performed by: INTERNAL MEDICINE

## 2023-10-18 RX ORDER — HEPARIN 100 UNIT/ML
500 SYRINGE INTRAVENOUS PRN
OUTPATIENT
Start: 2023-11-01

## 2023-10-18 RX ORDER — SODIUM CHLORIDE 0.9 % (FLUSH) 0.9 %
5-40 SYRINGE (ML) INJECTION PRN
OUTPATIENT
Start: 2023-11-01

## 2023-10-18 RX ORDER — DEXTROSE AND SODIUM CHLORIDE 5; .45 G/100ML; G/100ML
1000 INJECTION, SOLUTION INTRAVENOUS ONCE
Status: COMPLETED | OUTPATIENT
Start: 2023-10-18 | End: 2023-10-18

## 2023-10-18 RX ORDER — SODIUM CHLORIDE 9 MG/ML
5-250 INJECTION, SOLUTION INTRAVENOUS PRN
OUTPATIENT
Start: 2023-11-01

## 2023-10-18 RX ORDER — DEXTROSE AND SODIUM CHLORIDE 5; .45 G/100ML; G/100ML
1000 INJECTION, SOLUTION INTRAVENOUS ONCE
Start: 2023-11-01 | End: 2023-11-01

## 2023-10-18 RX ADMIN — DEXTROSE AND SODIUM CHLORIDE 1000 ML: 5; 450 INJECTION, SOLUTION INTRAVENOUS at 11:20

## 2023-10-25 ENCOUNTER — TELEPHONE (OUTPATIENT)
Facility: HOSPITAL | Age: 83
End: 2023-10-25

## 2023-11-01 ENCOUNTER — HOSPITAL ENCOUNTER (OUTPATIENT)
Facility: HOSPITAL | Age: 83
Setting detail: INFUSION SERIES
End: 2023-11-01
Payer: MEDICARE

## 2023-11-06 ENCOUNTER — HOSPITAL ENCOUNTER (OUTPATIENT)
Facility: HOSPITAL | Age: 83
Setting detail: INFUSION SERIES
Discharge: HOME | End: 2023-11-06
Payer: MEDICARE

## 2023-11-06 VITALS
TEMPERATURE: 97.8 F | DIASTOLIC BLOOD PRESSURE: 64 MMHG | RESPIRATION RATE: 16 BRPM | SYSTOLIC BLOOD PRESSURE: 97 MMHG | HEART RATE: 62 BPM

## 2023-11-06 DIAGNOSIS — E86.0 DEHYDRATION: Primary | ICD-10-CM

## 2023-11-06 DIAGNOSIS — N18.32 STAGE 3B CHRONIC KIDNEY DISEASE (HCC): ICD-10-CM

## 2023-11-06 PROCEDURE — 96361 HYDRATE IV INFUSION ADD-ON: CPT

## 2023-11-06 PROCEDURE — 96360 HYDRATION IV INFUSION INIT: CPT

## 2023-11-06 PROCEDURE — 2580000003 HC RX 258: Performed by: INTERNAL MEDICINE

## 2023-11-06 RX ORDER — DEXTROSE AND SODIUM CHLORIDE 5; .45 G/100ML; G/100ML
1000 INJECTION, SOLUTION INTRAVENOUS ONCE
Status: COMPLETED | OUTPATIENT
Start: 2023-11-06 | End: 2023-11-06

## 2023-11-06 RX ORDER — DEXTROSE AND SODIUM CHLORIDE 5; .45 G/100ML; G/100ML
1000 INJECTION, SOLUTION INTRAVENOUS ONCE
Start: 2023-11-13 | End: 2023-11-13

## 2023-11-06 RX ORDER — SODIUM CHLORIDE 9 MG/ML
5-250 INJECTION, SOLUTION INTRAVENOUS PRN
OUTPATIENT
Start: 2023-11-13

## 2023-11-06 RX ORDER — HEPARIN 100 UNIT/ML
500 SYRINGE INTRAVENOUS PRN
OUTPATIENT
Start: 2023-11-13

## 2023-11-06 RX ORDER — SODIUM CHLORIDE 0.9 % (FLUSH) 0.9 %
5-40 SYRINGE (ML) INJECTION PRN
OUTPATIENT
Start: 2023-11-13

## 2023-11-06 RX ADMIN — DEXTROSE AND SODIUM CHLORIDE 1000 ML: 5; 450 INJECTION, SOLUTION INTRAVENOUS at 11:18

## 2023-11-15 ENCOUNTER — HOSPITAL ENCOUNTER (OUTPATIENT)
Facility: HOSPITAL | Age: 83
Setting detail: INFUSION SERIES
Discharge: HOME | End: 2023-11-15
Payer: MEDICARE

## 2023-11-15 VITALS
HEART RATE: 55 BPM | SYSTOLIC BLOOD PRESSURE: 113 MMHG | DIASTOLIC BLOOD PRESSURE: 63 MMHG | RESPIRATION RATE: 18 BRPM | TEMPERATURE: 97.2 F

## 2023-11-15 DIAGNOSIS — N18.32 STAGE 3B CHRONIC KIDNEY DISEASE (HCC): ICD-10-CM

## 2023-11-15 DIAGNOSIS — E86.0 DEHYDRATION: Primary | ICD-10-CM

## 2023-11-15 PROCEDURE — 96365 THER/PROPH/DIAG IV INF INIT: CPT

## 2023-11-15 PROCEDURE — 2580000003 HC RX 258: Performed by: INTERNAL MEDICINE

## 2023-11-15 PROCEDURE — 96366 THER/PROPH/DIAG IV INF ADDON: CPT

## 2023-11-15 RX ORDER — SODIUM CHLORIDE 0.9 % (FLUSH) 0.9 %
5-40 SYRINGE (ML) INJECTION PRN
OUTPATIENT
Start: 2023-11-22

## 2023-11-15 RX ORDER — HEPARIN 100 UNIT/ML
500 SYRINGE INTRAVENOUS PRN
OUTPATIENT
Start: 2023-11-22

## 2023-11-15 RX ORDER — SODIUM CHLORIDE 9 MG/ML
5-250 INJECTION, SOLUTION INTRAVENOUS PRN
OUTPATIENT
Start: 2023-11-22

## 2023-11-15 RX ORDER — SODIUM CHLORIDE 0.9 % (FLUSH) 0.9 %
5-40 SYRINGE (ML) INJECTION PRN
Status: DISCONTINUED | OUTPATIENT
Start: 2023-11-15 | End: 2023-11-16 | Stop reason: HOSPADM

## 2023-11-15 RX ORDER — DEXTROSE AND SODIUM CHLORIDE 5; .45 G/100ML; G/100ML
1000 INJECTION, SOLUTION INTRAVENOUS ONCE
Start: 2023-11-22 | End: 2023-11-22

## 2023-11-15 RX ORDER — DEXTROSE AND SODIUM CHLORIDE 5; .45 G/100ML; G/100ML
1000 INJECTION, SOLUTION INTRAVENOUS ONCE
Status: COMPLETED | OUTPATIENT
Start: 2023-11-15 | End: 2023-11-15

## 2023-11-15 RX ADMIN — DEXTROSE AND SODIUM CHLORIDE 1000 ML: 5; 450 INJECTION, SOLUTION INTRAVENOUS at 11:45

## 2023-11-29 ENCOUNTER — HOSPITAL ENCOUNTER (OUTPATIENT)
Facility: HOSPITAL | Age: 83
Setting detail: INFUSION SERIES
Discharge: HOME | End: 2023-11-29
Payer: MEDICARE

## 2023-11-29 VITALS
HEART RATE: 69 BPM | RESPIRATION RATE: 16 BRPM | SYSTOLIC BLOOD PRESSURE: 90 MMHG | TEMPERATURE: 97.8 F | DIASTOLIC BLOOD PRESSURE: 58 MMHG

## 2023-11-29 DIAGNOSIS — N18.32 STAGE 3B CHRONIC KIDNEY DISEASE (HCC): ICD-10-CM

## 2023-11-29 DIAGNOSIS — E86.0 DEHYDRATION: Primary | ICD-10-CM

## 2023-11-29 PROCEDURE — 2580000003 HC RX 258: Performed by: INTERNAL MEDICINE

## 2023-11-29 PROCEDURE — 96361 HYDRATE IV INFUSION ADD-ON: CPT

## 2023-11-29 PROCEDURE — 96360 HYDRATION IV INFUSION INIT: CPT

## 2023-11-29 RX ORDER — SODIUM CHLORIDE 0.9 % (FLUSH) 0.9 %
5-40 SYRINGE (ML) INJECTION PRN
OUTPATIENT
Start: 2023-12-13

## 2023-11-29 RX ORDER — SODIUM CHLORIDE 9 MG/ML
5-250 INJECTION, SOLUTION INTRAVENOUS PRN
OUTPATIENT
Start: 2023-12-13

## 2023-11-29 RX ORDER — DEXTROSE AND SODIUM CHLORIDE 5; .45 G/100ML; G/100ML
1000 INJECTION, SOLUTION INTRAVENOUS ONCE
Start: 2023-12-13 | End: 2023-12-13

## 2023-11-29 RX ORDER — HEPARIN 100 UNIT/ML
500 SYRINGE INTRAVENOUS PRN
OUTPATIENT
Start: 2023-12-13

## 2023-11-29 RX ORDER — DEXTROSE AND SODIUM CHLORIDE 5; .45 G/100ML; G/100ML
1000 INJECTION, SOLUTION INTRAVENOUS ONCE
Status: COMPLETED | OUTPATIENT
Start: 2023-11-29 | End: 2023-11-29

## 2023-11-29 RX ADMIN — DEXTROSE AND SODIUM CHLORIDE 1000 ML: 5; 450 INJECTION, SOLUTION INTRAVENOUS at 11:17

## 2023-11-29 ASSESSMENT — PAIN SCALES - GENERAL: PAINLEVEL_OUTOF10: 0

## 2023-12-13 ENCOUNTER — HOSPITAL ENCOUNTER (OUTPATIENT)
Facility: HOSPITAL | Age: 83
Setting detail: INFUSION SERIES
Discharge: HOME | End: 2023-12-13
Payer: MEDICARE

## 2023-12-13 VITALS
TEMPERATURE: 97.9 F | DIASTOLIC BLOOD PRESSURE: 58 MMHG | SYSTOLIC BLOOD PRESSURE: 98 MMHG | HEART RATE: 54 BPM | RESPIRATION RATE: 17 BRPM

## 2023-12-13 DIAGNOSIS — N18.32 STAGE 3B CHRONIC KIDNEY DISEASE (HCC): ICD-10-CM

## 2023-12-13 DIAGNOSIS — E86.0 DEHYDRATION: Primary | ICD-10-CM

## 2023-12-13 PROCEDURE — 96360 HYDRATION IV INFUSION INIT: CPT

## 2023-12-13 PROCEDURE — 96361 HYDRATE IV INFUSION ADD-ON: CPT

## 2023-12-13 PROCEDURE — 2580000003 HC RX 258: Performed by: INTERNAL MEDICINE

## 2023-12-13 RX ORDER — DEXTROSE AND SODIUM CHLORIDE 5; .45 G/100ML; G/100ML
1000 INJECTION, SOLUTION INTRAVENOUS ONCE
Status: CANCELLED
Start: 2023-12-13 | End: 2023-12-13

## 2023-12-13 RX ORDER — SODIUM CHLORIDE 9 MG/ML
5-250 INJECTION, SOLUTION INTRAVENOUS PRN
Status: DISCONTINUED | OUTPATIENT
Start: 2023-12-13 | End: 2023-12-14 | Stop reason: HOSPADM

## 2023-12-13 RX ORDER — SODIUM CHLORIDE 0.9 % (FLUSH) 0.9 %
5-40 SYRINGE (ML) INJECTION PRN
OUTPATIENT
Start: 2023-12-13

## 2023-12-13 RX ORDER — DEXTROSE AND SODIUM CHLORIDE 5; .45 G/100ML; G/100ML
1000 INJECTION, SOLUTION INTRAVENOUS ONCE
Status: COMPLETED | OUTPATIENT
Start: 2023-12-13 | End: 2023-12-13

## 2023-12-13 RX ORDER — SODIUM CHLORIDE 0.9 % (FLUSH) 0.9 %
5-40 SYRINGE (ML) INJECTION PRN
Status: DISCONTINUED | OUTPATIENT
Start: 2023-12-13 | End: 2023-12-14 | Stop reason: HOSPADM

## 2023-12-13 RX ORDER — HEPARIN 100 UNIT/ML
500 SYRINGE INTRAVENOUS PRN
OUTPATIENT
Start: 2023-12-13

## 2023-12-13 RX ORDER — HEPARIN 100 UNIT/ML
500 SYRINGE INTRAVENOUS PRN
Status: DISCONTINUED | OUTPATIENT
Start: 2023-12-13 | End: 2023-12-14 | Stop reason: HOSPADM

## 2023-12-13 RX ORDER — SODIUM CHLORIDE 9 MG/ML
5-250 INJECTION, SOLUTION INTRAVENOUS PRN
OUTPATIENT
Start: 2023-12-13

## 2023-12-13 RX ADMIN — DEXTROSE AND SODIUM CHLORIDE 1000 ML: 5; 450 INJECTION, SOLUTION INTRAVENOUS at 11:30

## 2023-12-13 ASSESSMENT — PAIN SCALES - GENERAL: PAINLEVEL_OUTOF10: 0

## 2024-01-10 ENCOUNTER — HOSPITAL ENCOUNTER (OUTPATIENT)
Facility: HOSPITAL | Age: 84
Setting detail: INFUSION SERIES
Discharge: HOME | End: 2024-01-10
Payer: MEDICARE

## 2024-01-10 VITALS
DIASTOLIC BLOOD PRESSURE: 51 MMHG | TEMPERATURE: 97.8 F | HEART RATE: 63 BPM | SYSTOLIC BLOOD PRESSURE: 92 MMHG | RESPIRATION RATE: 18 BRPM

## 2024-01-10 DIAGNOSIS — N18.32 STAGE 3B CHRONIC KIDNEY DISEASE (HCC): ICD-10-CM

## 2024-01-10 DIAGNOSIS — E86.0 DEHYDRATION: Primary | ICD-10-CM

## 2024-01-10 PROCEDURE — 2580000003 HC RX 258: Performed by: INTERNAL MEDICINE

## 2024-01-10 PROCEDURE — 96361 HYDRATE IV INFUSION ADD-ON: CPT

## 2024-01-10 PROCEDURE — 96360 HYDRATION IV INFUSION INIT: CPT

## 2024-01-10 RX ORDER — DEXTROSE AND SODIUM CHLORIDE 5; .45 G/100ML; G/100ML
INJECTION, SOLUTION INTRAVENOUS ONCE
Status: COMPLETED | OUTPATIENT
Start: 2024-01-10 | End: 2024-01-10

## 2024-01-10 RX ORDER — DEXTROSE AND SODIUM CHLORIDE 5; .45 G/100ML; G/100ML
INJECTION, SOLUTION INTRAVENOUS ONCE
Start: 2024-01-24 | End: 2024-01-24

## 2024-01-10 RX ADMIN — DEXTROSE AND SODIUM CHLORIDE: 5; 450 INJECTION, SOLUTION INTRAVENOUS at 11:35

## 2024-01-10 NOTE — PROGRESS NOTES
[facility] Short Note                       Date: January 10, 2024    Name: Emily Banks    MRN: [id number]         : 1940      Pt admit to [facility] for hydration ambulatory in stable condition. Assessment completed and documented in flowsheets. No acute concerns at this time. No labs ordered/due.    Ms. Banks's vitals were reviewed. See flowsheet.  Patient Vitals for the past 12 hrs:   Temp Pulse Resp BP   01/10/24 1125 97.8 °F (36.6 °C) 59 18 100/66     Medications given: via PIV in L forearm  Medications Administered         dextrose 5 % and 0.45 % sodium chloride infusion Admin Date  01/10/2024 Action  New Bag Dose   Rate  250 mL/hr Route  IntraVENous Administered By  Arianna San RN          PIV positive blood return noted, flushed and removed prior to discharge.    Ms. Banks tolerated the infusion, and had no complaints.    [facility] and is aware of future appointments.     Future Appointments   Date Time Provider Department Center   2024 11:30 AM H1 [facility]ET [facility]   2024 11:30 AM H2 [facility]   2024 11:30 AM H2 [facility]   3/6/2024 11:30 AM H1 [facility]   3/20/2024 11:30 AM G2 [facility]   4/3/2024 11:30 AM H1 [facility]   2024 11:30 AM G2 [facility]   2024 11:30 AM G2 [facility]   5/15/2024 11:30 AM G1 [facility]   2024 11:30 AM H1 [facility]   2024 11:30 AM G1 [facility]   2024 11:30 AM H2 [facility]   7/10/2024 11:30 AM H2 [facility]   2024 11:30 AM G1 [facility]   2024 11:30 AM H1 [facility]   2024 11:30 AM H2 [facility]   2024 11:30 AM H2 [facility]   2024 11:30 AM H2 [facility]   10/2/2024 11:30 AM H1 [facility]        Arianna San RN  January 10, 2024  3:33 PM

## 2024-01-22 ENCOUNTER — HOSPITAL ENCOUNTER (OUTPATIENT)
Facility: HOSPITAL | Age: 84
Setting detail: INFUSION SERIES
Discharge: HOME | End: 2024-01-22
Payer: MEDICARE

## 2024-01-22 VITALS
TEMPERATURE: 97.6 F | HEART RATE: 56 BPM | SYSTOLIC BLOOD PRESSURE: 108 MMHG | RESPIRATION RATE: 18 BRPM | DIASTOLIC BLOOD PRESSURE: 69 MMHG | OXYGEN SATURATION: 98 %

## 2024-01-22 DIAGNOSIS — E86.0 DEHYDRATION: Primary | ICD-10-CM

## 2024-01-22 DIAGNOSIS — N18.32 STAGE 3B CHRONIC KIDNEY DISEASE (HCC): ICD-10-CM

## 2024-01-22 PROCEDURE — 96360 HYDRATION IV INFUSION INIT: CPT

## 2024-01-22 PROCEDURE — 96361 HYDRATE IV INFUSION ADD-ON: CPT

## 2024-01-22 PROCEDURE — 2580000003 HC RX 258: Performed by: INTERNAL MEDICINE

## 2024-01-22 RX ORDER — DEXTROSE AND SODIUM CHLORIDE 5; .45 G/100ML; G/100ML
INJECTION, SOLUTION INTRAVENOUS ONCE
Status: CANCELLED
Start: 2024-02-05 | End: 2024-02-05

## 2024-01-22 RX ORDER — DEXTROSE AND SODIUM CHLORIDE 5; .45 G/100ML; G/100ML
INJECTION, SOLUTION INTRAVENOUS ONCE
Status: COMPLETED | OUTPATIENT
Start: 2024-01-22 | End: 2024-01-22

## 2024-01-22 RX ADMIN — DEXTROSE AND SODIUM CHLORIDE: 5; 450 INJECTION, SOLUTION INTRAVENOUS at 09:24

## 2024-01-22 NOTE — PROGRESS NOTES
[facility] Progress Note    Date: 2024    Name: Emily Banks    MRN: [id number]         : 1940    Ms. Banks Arrived ambulatory and in no distress for Hydration.      Assessment was completed and documented in flowsheets. No acute concerns at this time. 24G IV placed without difficulty, positive blood return noted.    Ms. Banks's vital signs for this visit.  Vitals:    24 0906   BP: 108/69   Pulse: 56   Resp: 18   Temp: 97.6 °F (36.4 °C)   SpO2: 98%          Medications given:   Medications Administered         dextrose 5 % and 0.45 % sodium chloride infusion Admin Date  2024 Action  New Bag Dose   Rate  250 mL/hr Route  IntraVENous Administered By  Lynne Askew, CINDY              Ms. Banks tolerated the infusion, and had no complaints.    PIV flushed and removed after completion of infusion. 2x2 and coban placed.     [facility]. Banks [facility] condition and is aware of future appointments.     Future Appointments   Date Time Provider Department Center   2024 11:30 AM H2 [facility]   2024 11:30 AM H2 [facility]   3/6/2024 11:30 AM H1 [facility]   3/20/2024 11:30 AM G2 [facility]   4/3/2024 11:30 AM H1 [facility]   2024 11:30 AM G2 [facility]   2024 11:30 AM G2 [facility]   5/15/2024 11:30 AM G1 [facility]   2024 11:30 AM H1 [facility]   2024 11:30 AM G1 [facility]   2024 11:30 AM H2 [facility]   7/10/2024 11:30 AM H2 [facility]   2024 11:30 AM G1 [facility]   2024 11:30 AM H1 [facility]   2024 11:30 AM H2 [facility]   2024 11:30 AM H2 [facility]   2024 11:30 AM H2 [facility]   10/2/2024 11:30 AM H1 [facility]       LYNNE ASKEW RN  ,

## 2024-01-24 ENCOUNTER — HOSPITAL ENCOUNTER (OUTPATIENT)
Facility: HOSPITAL | Age: 84
Setting detail: INFUSION SERIES
End: 2024-01-24
Payer: MEDICARE

## 2024-01-29 ENCOUNTER — HOSPITAL ENCOUNTER (OUTPATIENT)
Facility: HOSPITAL | Age: 84
Setting detail: INFUSION SERIES
End: 2024-01-29
Payer: MEDICARE

## 2024-02-07 ENCOUNTER — HOSPITAL ENCOUNTER (OUTPATIENT)
Facility: HOSPITAL | Age: 84
Setting detail: INFUSION SERIES
Discharge: HOME | End: 2024-02-07
Payer: MEDICARE

## 2024-02-07 VITALS
SYSTOLIC BLOOD PRESSURE: 119 MMHG | RESPIRATION RATE: 18 BRPM | HEART RATE: 57 BPM | TEMPERATURE: 97.5 F | DIASTOLIC BLOOD PRESSURE: 64 MMHG

## 2024-02-07 DIAGNOSIS — N18.32 STAGE 3B CHRONIC KIDNEY DISEASE (HCC): ICD-10-CM

## 2024-02-07 DIAGNOSIS — E86.0 DEHYDRATION: Primary | ICD-10-CM

## 2024-02-07 PROCEDURE — 2580000003 HC RX 258: Performed by: INTERNAL MEDICINE

## 2024-02-07 PROCEDURE — 96361 HYDRATE IV INFUSION ADD-ON: CPT

## 2024-02-07 PROCEDURE — 96360 HYDRATION IV INFUSION INIT: CPT

## 2024-02-07 RX ORDER — DEXTROSE AND SODIUM CHLORIDE 5; .45 G/100ML; G/100ML
INJECTION, SOLUTION INTRAVENOUS ONCE
Status: COMPLETED | OUTPATIENT
Start: 2024-02-07 | End: 2024-02-07

## 2024-02-07 RX ORDER — DEXTROSE AND SODIUM CHLORIDE 5; .45 G/100ML; G/100ML
INJECTION, SOLUTION INTRAVENOUS ONCE
Start: 2024-02-21 | End: 2024-02-21

## 2024-02-07 RX ADMIN — DEXTROSE MONOHYDRATE AND SODIUM CHLORIDE 1000 ML: 5; .45 INJECTION, SOLUTION INTRAVENOUS at 11:45

## 2024-02-07 NOTE — PROGRESS NOTES
hospitals Short Note                       Date: 2024    Name: Emily Banks    MRN: [id number]         : 1940    [facility] ambulatory in stable condition. Assessment completed. No new concerns voiced.      Ms. Banks's vitals were reviewed prior to treatment.   Patient Vitals for the past 12 hrs:   Temp Pulse Resp BP   24 1136 97.5 °F (36.4 °C) 57 18 119/64         Peripheral IV with positive blood return.       Medications Administered         dextrose 5 % and 0.45 % sodium chloride infusion Admin Date  2024 Action  New Bag Dose  1,000 mL Rate  250 mL/hr Route  IntraVENous Administered By  Raphael Goins RN               Pt tolerated treatment well. D/c home ambulatory in no distress. Pt aware of next appointment scheduled.    Future Appointments   Date Time Provider Department Center   2024 11:30 AM H2 [facility]   3/6/2024 11:30 AM H1 [facility]   3/20/2024 11:30 AM G2 [facility]   4/3/2024 11:30 AM H1 [facility]   2024 11:30 AM G2 [facility]   2024 11:30 AM G2 [facility]   5/15/2024 11:30 AM G1 [facility]   2024 11:30 AM H1 [facility]   2024 11:30 AM G1 [facility]   2024 11:30 AM H2 [facility]   7/10/2024 11:30 AM H2 [facility]   2024 11:30 AM G1 VI[facility]   2024 11:30 AM H1 [facility]   2024 11:30 AM H2 [facility]   2024 11:30 AM H2 [facility]   2024 11:30 AM H2 [facility]   10/2/2024 11:30 AM DANIELA TALBOT [facility]       RAPHAEL GOINS RN  2024  4:22 PM

## 2024-02-21 ENCOUNTER — HOSPITAL ENCOUNTER (OUTPATIENT)
Facility: HOSPITAL | Age: 84
Setting detail: INFUSION SERIES
Discharge: HOME | End: 2024-02-21
Payer: MEDICARE

## 2024-02-21 VITALS
HEART RATE: 61 BPM | SYSTOLIC BLOOD PRESSURE: 107 MMHG | RESPIRATION RATE: 18 BRPM | DIASTOLIC BLOOD PRESSURE: 58 MMHG | TEMPERATURE: 97.6 F

## 2024-02-21 DIAGNOSIS — E86.0 DEHYDRATION: Primary | ICD-10-CM

## 2024-02-21 DIAGNOSIS — N18.32 STAGE 3B CHRONIC KIDNEY DISEASE (HCC): ICD-10-CM

## 2024-02-21 PROCEDURE — 2580000003 HC RX 258: Performed by: INTERNAL MEDICINE

## 2024-02-21 PROCEDURE — 96360 HYDRATION IV INFUSION INIT: CPT

## 2024-02-21 PROCEDURE — 96361 HYDRATE IV INFUSION ADD-ON: CPT

## 2024-02-21 RX ORDER — DEXTROSE AND SODIUM CHLORIDE 5; .45 G/100ML; G/100ML
INJECTION, SOLUTION INTRAVENOUS ONCE
Status: COMPLETED | OUTPATIENT
Start: 2024-02-21 | End: 2024-02-21

## 2024-02-21 RX ORDER — DEXTROSE AND SODIUM CHLORIDE 5; .45 G/100ML; G/100ML
INJECTION, SOLUTION INTRAVENOUS ONCE
Start: 2024-03-06 | End: 2024-03-06

## 2024-02-21 RX ADMIN — DEXTROSE MONOHYDRATE AND SODIUM CHLORIDE: 5; .45 INJECTION, SOLUTION INTRAVENOUS at 11:36

## 2024-02-21 NOTE — PROGRESS NOTES
[facility] Short Note                       Date: 2024    Name: Emily Banks    MRN: [id number]         : 1940      1130 Pt admit to [facility] for Hydration ambulatory in stable condition. Assessment completed. No new concerns voiced. PIV placed in right AC.  Please review pending lab results in CC.      Ms. Banks's vitals were reviewed prior to and after treatment.   Patient Vitals for the past 12 hrs:   Temp Pulse Resp BP   24 1130 97.6 °F (36.4 °C) 59 18 112/61             Medications given:   Medications Administered         dextrose 5 % and 0.45 % sodium chloride infusion Admin Date  2024 Action  New Bag Dose   Rate  250 mL/hr Route  IntraVENous Administered By  Iesha Sewell RN            PIV removed per protocol.     Ms. Banks tolerated the infusion, and had no complaints.    [facility]. Banks [facility] condition.     Future Appointments   Date Time Provider Department Center   3/6/2024 11:30 AM H1 [facility]   3/13/2024 11:30 AM H1 [facility]   4/3/2024 11:30 AM H1 [facility]   2024 11:30 AM G2 VIET [facility]   2024 11:30 AM G2 VIET [facility]   5/15/2024 11:30 AM G1 VIET [facility]   2024 11:30 AM H1 VIET [facility]   2024 11:30 AM G1 VIET [facility]   2024 11:30 AM H2 VIET FASTR[facility]   7/10/2024 11:30 AM H2 VIET [facility]   2024 11:30 AM G1 VIET FASTRACK [facility]   2024 11:30 AM H1 VIET FASTR[facility]   2024 11:30 AM H2 VIET FASTRACK [facility]   2024 11:30 AM H2 VIET FASTRACK [facility]   2024 11:30 AM H2 VIET FASTR[facility]   10/2/2024 11:30 AM  VIET TALBOT [facility]       Iesha Sewell RN  2024  3:53 PM

## 2024-03-06 ENCOUNTER — HOSPITAL ENCOUNTER (OUTPATIENT)
Facility: HOSPITAL | Age: 84
Setting detail: INFUSION SERIES
Discharge: HOME | End: 2024-03-06
Payer: MEDICARE

## 2024-03-06 VITALS
TEMPERATURE: 97.4 F | HEART RATE: 66 BPM | DIASTOLIC BLOOD PRESSURE: 62 MMHG | OXYGEN SATURATION: 98 % | SYSTOLIC BLOOD PRESSURE: 118 MMHG

## 2024-03-06 DIAGNOSIS — E86.0 DEHYDRATION: Primary | ICD-10-CM

## 2024-03-06 DIAGNOSIS — N18.32 STAGE 3B CHRONIC KIDNEY DISEASE (HCC): ICD-10-CM

## 2024-03-06 PROCEDURE — 96361 HYDRATE IV INFUSION ADD-ON: CPT

## 2024-03-06 PROCEDURE — 96360 HYDRATION IV INFUSION INIT: CPT

## 2024-03-06 PROCEDURE — 2580000003 HC RX 258: Performed by: INTERNAL MEDICINE

## 2024-03-06 RX ORDER — DEXTROSE AND SODIUM CHLORIDE 5; .45 G/100ML; G/100ML
INJECTION, SOLUTION INTRAVENOUS ONCE
Status: COMPLETED | OUTPATIENT
Start: 2024-03-06 | End: 2024-03-06

## 2024-03-06 RX ORDER — DEXTROSE AND SODIUM CHLORIDE 5; .45 G/100ML; G/100ML
INJECTION, SOLUTION INTRAVENOUS ONCE
Start: 2024-03-20 | End: 2024-03-20

## 2024-03-06 RX ADMIN — DEXTROSE MONOHYDRATE AND SODIUM CHLORIDE: 5; .45 INJECTION, SOLUTION INTRAVENOUS at 11:37

## 2024-03-06 ASSESSMENT — PAIN SCALES - GENERAL: PAINLEVEL_OUTOF10: 0

## 2024-03-06 NOTE — PROGRESS NOTES
[facility] Short Note                       Date: 2024    Name: Emily Banks    MRN: [id number]         : 1940      1130 Pt admit to [facility] for Hydration ambulatory in stable condition. Assessment completed. No new concerns voiced.  PIV established L FA pos blood return noted    Ms. Banks's vitals were reviewed prior to and after treatment.   Patient Vitals for the past 12 hrs:   Temp Pulse BP SpO2   24 1515 -- -- 118/62 --   24 1130 97.4 °F (36.3 °C) 66 110/68 98 %           Medications Administered         dextrose 5 % and 0.45 % sodium chloride infusion Admin Date  2024 Action  New Bag Dose   Rate  250 mL/hr Route  IntraVENous Administered By  Gwen Roque RN          PIV removed    Ms. Banks tolerated the infusion, and had no complaints.    [facility]. Pt aware of next appt    Future Appointments   Date Time Provider Department Center   3/13/2024 11:30 AM H1 VIET FASTRACK [facility]   4/3/2024 11:30 AM H1 VIET FASTRACK [facility]   2024 11:30 AM G2 VIET FASTRACK [facility]   2024 11:30 AM G2 VIET FASTRACK [facility]   5/15/2024 11:30 AM G1 VIET FASTRACK [facility]   2024 11:30 AM H1 VIET FASTRACK [facility]   2024 11:30 AM G1 VIET FASTRACK [facility]   2024 11:30 AM H2 VIET FASTRACK [facility]   7/10/2024 11:30 AM H2 VIET FASTRACK [facility]   2024 11:30 AM G1 VIET FASTRACK [facility]   2024 11:30 AM H1 VIET FASTRACK [facility]   2024 11:30 AM H2 VIET FASTRACK [facility]   2024 11:30 AM H2 VIET FASTRACK [facility]   2024 11:30 AM H2 VIET FASTRACK [facility]   10/2/2024 11:30 AM H1 [facility]ET FASTRACK [facility]       Gwen Roque RN  2024  3:38 PM

## 2024-03-13 ENCOUNTER — HOSPITAL ENCOUNTER (OUTPATIENT)
Facility: HOSPITAL | Age: 84
Setting detail: INFUSION SERIES
Discharge: HOME | End: 2024-03-13
Payer: MEDICARE

## 2024-03-13 VITALS
SYSTOLIC BLOOD PRESSURE: 113 MMHG | DIASTOLIC BLOOD PRESSURE: 71 MMHG | HEART RATE: 72 BPM | OXYGEN SATURATION: 96 % | TEMPERATURE: 97.8 F | RESPIRATION RATE: 16 BRPM

## 2024-03-13 DIAGNOSIS — E86.0 DEHYDRATION: Primary | ICD-10-CM

## 2024-03-13 DIAGNOSIS — N18.32 STAGE 3B CHRONIC KIDNEY DISEASE (HCC): ICD-10-CM

## 2024-03-13 PROCEDURE — 2580000003 HC RX 258: Performed by: INTERNAL MEDICINE

## 2024-03-13 PROCEDURE — 96361 HYDRATE IV INFUSION ADD-ON: CPT

## 2024-03-13 PROCEDURE — 96360 HYDRATION IV INFUSION INIT: CPT

## 2024-03-13 RX ORDER — DEXTROSE AND SODIUM CHLORIDE 5; .45 G/100ML; G/100ML
INJECTION, SOLUTION INTRAVENOUS ONCE
Start: 2024-03-20 | End: 2024-03-20

## 2024-03-13 RX ORDER — DEXTROSE AND SODIUM CHLORIDE 5; .45 G/100ML; G/100ML
INJECTION, SOLUTION INTRAVENOUS ONCE
Status: COMPLETED | OUTPATIENT
Start: 2024-03-13 | End: 2024-03-13

## 2024-03-13 RX ADMIN — DEXTROSE AND SODIUM CHLORIDE 1000 ML: 5; 450 INJECTION, SOLUTION INTRAVENOUS at 12:26

## 2024-03-13 ASSESSMENT — PAIN SCALES - GENERAL: PAINLEVEL_OUTOF10: 0

## 2024-03-13 NOTE — PROGRESS NOTES
[facility] Progress Note    Date: March 13, 2024        1130: Pt arrived ambulatory to [facility] for Hydration infusion in stable condition.  Assessment completed. PIV placed in left, posterior, proximal forearm  with positive blood return.         Patient Vitals for the past 12 hrs:   Temp Pulse Resp BP SpO2   03/13/24 1145 97.8 °F (36.6 °C) 72 16 113/71 96 %         Medications Administered         dextrose 5 % and 0.45 % sodium chloride infusion Admin Date  03/13/2024 Action  New Bag Dose  1,000 mL Rate  250 mL/hr Route  IntraVENous Administered By  Radha Sellers, RN               Ms. Banks tolerated the infusion, and had no complaints.    PIV flushed and removed. 2x2 and coban placed    Ms. Banks was [facility] in [facility] condition. Patient is aware of next scheduled [facility] appointment.         Future Appointments   Date Time Provider Department Center   4/3/2024 11:30 AM H1 VIET FASTRACK [facility]   4/17/2024 11:30 AM G2 VIET FASTRACK [facility]   5/1/2024 11:30 AM G2 VIET FASTRACK [facility]   5/15/2024 11:30 AM G1 VIET FASTRACK [facility]   5/29/2024 11:30 AM H1 VIET FASTRACK [facility]   6/12/2024 11:30 AM G1 VIET FASTRACK [facility]   6/26/2024 11:30 AM H2 VIET FASTRACK [facility]   7/10/2024 11:30 AM H2 VIET FASTRACK [facility]   7/24/2024 11:30 AM G1 VIET FASTRACK [facility]   8/7/2024 11:30 AM H1 VIET FASTRACK [facility]   8/21/2024 11:30 AM H2 VIET FASTRACK [facility]   9/4/2024 11:30 AM H2 VIET FASTRACK [facility]   9/18/2024 11:30 AM H2 VIET FASTRACK [facility]   10/2/2024 11:30 AM H1 [facility]ET FASTRACK [facility]         Radha Sellers RN, RN  March 13, 2024

## 2024-03-20 ENCOUNTER — APPOINTMENT (OUTPATIENT)
Facility: HOSPITAL | Age: 84
End: 2024-03-20
Payer: MEDICARE

## 2024-04-03 ENCOUNTER — HOSPITAL ENCOUNTER (OUTPATIENT)
Facility: HOSPITAL | Age: 84
Setting detail: INFUSION SERIES
Discharge: HOME | End: 2024-04-03
Payer: MEDICARE

## 2024-04-03 VITALS
RESPIRATION RATE: 18 BRPM | OXYGEN SATURATION: 98 % | TEMPERATURE: 97.7 F | HEART RATE: 55 BPM | DIASTOLIC BLOOD PRESSURE: 53 MMHG | SYSTOLIC BLOOD PRESSURE: 92 MMHG

## 2024-04-03 DIAGNOSIS — N18.32 STAGE 3B CHRONIC KIDNEY DISEASE (HCC): ICD-10-CM

## 2024-04-03 DIAGNOSIS — E86.0 DEHYDRATION: Primary | ICD-10-CM

## 2024-04-03 PROCEDURE — 2580000003 HC RX 258: Performed by: INTERNAL MEDICINE

## 2024-04-03 PROCEDURE — 96360 HYDRATION IV INFUSION INIT: CPT

## 2024-04-03 PROCEDURE — 96361 HYDRATE IV INFUSION ADD-ON: CPT

## 2024-04-03 RX ORDER — DEXTROSE AND SODIUM CHLORIDE 5; .45 G/100ML; G/100ML
INJECTION, SOLUTION INTRAVENOUS ONCE
Start: 2024-04-10 | End: 2024-04-10

## 2024-04-03 RX ORDER — DEXTROSE AND SODIUM CHLORIDE 5; .45 G/100ML; G/100ML
INJECTION, SOLUTION INTRAVENOUS ONCE
Status: COMPLETED | OUTPATIENT
Start: 2024-04-03 | End: 2024-04-03

## 2024-04-03 RX ADMIN — DEXTROSE MONOHYDRATE AND SODIUM CHLORIDE: 5; .45 INJECTION, SOLUTION INTRAVENOUS at 12:40

## 2024-04-03 ASSESSMENT — PAIN SCALES - GENERAL: PAINLEVEL_OUTOF10: 0

## 2024-04-17 ENCOUNTER — HOSPITAL ENCOUNTER (OUTPATIENT)
Facility: HOSPITAL | Age: 84
Setting detail: INFUSION SERIES
Discharge: HOME | End: 2024-04-17
Payer: MEDICARE

## 2024-04-17 VITALS
SYSTOLIC BLOOD PRESSURE: 94 MMHG | OXYGEN SATURATION: 97 % | DIASTOLIC BLOOD PRESSURE: 54 MMHG | TEMPERATURE: 97.5 F | HEART RATE: 50 BPM | RESPIRATION RATE: 16 BRPM

## 2024-04-17 DIAGNOSIS — N18.32 STAGE 3B CHRONIC KIDNEY DISEASE (HCC): ICD-10-CM

## 2024-04-17 DIAGNOSIS — E86.0 DEHYDRATION: Primary | ICD-10-CM

## 2024-04-17 PROCEDURE — 2580000003 HC RX 258: Performed by: INTERNAL MEDICINE

## 2024-04-17 PROCEDURE — 96361 HYDRATE IV INFUSION ADD-ON: CPT

## 2024-04-17 PROCEDURE — 96360 HYDRATION IV INFUSION INIT: CPT

## 2024-04-17 RX ORDER — DEXTROSE AND SODIUM CHLORIDE 5; .45 G/100ML; G/100ML
INJECTION, SOLUTION INTRAVENOUS ONCE
Start: 2024-05-01 | End: 2024-05-01

## 2024-04-17 RX ORDER — DEXTROSE AND SODIUM CHLORIDE 5; .45 G/100ML; G/100ML
INJECTION, SOLUTION INTRAVENOUS ONCE
Status: COMPLETED | OUTPATIENT
Start: 2024-04-17 | End: 2024-04-17

## 2024-04-17 RX ADMIN — DEXTROSE MONOHYDRATE AND SODIUM CHLORIDE: 5; .45 INJECTION, SOLUTION INTRAVENOUS at 11:50

## 2024-04-17 ASSESSMENT — PAIN SCALES - GENERAL: PAINLEVEL_OUTOF10: 0

## 2024-04-17 NOTE — PROGRESS NOTES
[facility] Progress Note    Date: April 17, 2024    1130: Pt arrived ambulatory to [facility] for Hydration in stable condition.  Assessment completed. PIV placed to left wrist with positive blood return.  Patient Vitals for the past 12 hrs:   Temp Pulse Resp BP SpO2   04/17/24 1555 -- 50 -- (!) 94/54 --   04/17/24 1138 97.5 °F (36.4 °C) 59 16 (!) 99/59 97 %     Medications Administered         dextrose 5 % and 0.45 % sodium chloride infusion Admin Date  04/17/2024 Action  New Bag Dose   Rate  250 mL/hr Route  IntraVENous Administered By  Peace Lara, RN           Ms. Banks tolerated the infusion, and had no complaints. PIV flushed and removed. 2x2 and coban placed.     Ms. Banks was [facility] in stable condition at 1600. Patient is aware of next scheduled [facility] appointment.   Future Appointments   Date Time Provider Department Center   5/1/2024 11:30 AM VIET FASTTRACK 1 [facility]   5/15/2024 11:30 AM VIET FASTTRACK 1 [facility]   5/29/2024 11:30 AM VIET FASTTRACK 2 [facility]   6/12/2024 11:30 AM VIET FASTTRACK 1 [facility]   6/26/2024 11:30 AM VIET FASTTRACK 3 [facility]   7/10/2024 11:30 AM VIET FASTTRACK 3 [facility]   7/24/2024 11:30 AM VIET FASTTRACK 1 [facility]   8/7/2024 11:30 AM VIET FASTTRACK 2 [facility]   8/21/2024 11:30 AM VIET FASTTRACK 3 [facility]   9/4/2024 11:30 AM VIET FASTTRACK 3 [facility]   9/18/2024 11:30 AM VIET FASTTRACK 3 [facility]   10/2/2024 11:30 AM VIET FASTTRACK 2 [facility]         PEACE LARA RN, RN  April 17, 2024

## 2024-05-01 ENCOUNTER — HOSPITAL ENCOUNTER (OUTPATIENT)
Facility: HOSPITAL | Age: 84
Setting detail: INFUSION SERIES
Discharge: HOME | End: 2024-05-01
Payer: MEDICARE

## 2024-05-01 VITALS
DIASTOLIC BLOOD PRESSURE: 54 MMHG | TEMPERATURE: 97.2 F | SYSTOLIC BLOOD PRESSURE: 87 MMHG | HEART RATE: 64 BPM | OXYGEN SATURATION: 100 %

## 2024-05-01 DIAGNOSIS — E86.0 DEHYDRATION: Primary | ICD-10-CM

## 2024-05-01 DIAGNOSIS — N18.32 STAGE 3B CHRONIC KIDNEY DISEASE (HCC): ICD-10-CM

## 2024-05-01 PROCEDURE — 2580000003 HC RX 258: Performed by: INTERNAL MEDICINE

## 2024-05-01 PROCEDURE — 96361 HYDRATE IV INFUSION ADD-ON: CPT

## 2024-05-01 PROCEDURE — 96360 HYDRATION IV INFUSION INIT: CPT

## 2024-05-01 RX ORDER — DEXTROSE AND SODIUM CHLORIDE 5; .45 G/100ML; G/100ML
INJECTION, SOLUTION INTRAVENOUS ONCE
Status: COMPLETED | OUTPATIENT
Start: 2024-05-01 | End: 2024-05-01

## 2024-05-01 RX ORDER — DEXTROSE AND SODIUM CHLORIDE 5; .45 G/100ML; G/100ML
INJECTION, SOLUTION INTRAVENOUS ONCE
Start: 2024-05-15 | End: 2024-05-15

## 2024-05-01 RX ADMIN — DEXTROSE MONOHYDRATE AND SODIUM CHLORIDE 1000 ML: 5; .45 INJECTION, SOLUTION INTRAVENOUS at 11:34

## 2024-05-01 NOTE — PROGRESS NOTES
Pt arrived to hospitals for Hydration in stable condition.  PIV accessed with positive blood return.     Vitals:    05/01/24 1544   BP: (!) 87/54   Pulse: 64   Temp:    SpO2:      Medications Administered         dextrose 5 % and 0.45 % sodium chloride infusion Admin Date  05/01/2024 Action  New Bag Dose  1,000 mL Rate  250 mL/hr Route  IntraVENous Administered By  Jenifer Martinez, RN              PIV flushed and de- accessed per protocol. Pt tolerated treatment well. D/Cd from hospitals in no distress.    Future Appointments   Date Time Provider Department Center   5/15/2024 11:30 AM VIET FASTTRACK 1 [facility]   5/29/2024 11:30 AM VIET FASTTRACK 2 [facility]   6/12/2024 11:30 AM VIET FASTTRACK 1 [facility]   6/26/2024 11:30 AM VIET FASTTRACK 3 [facility]   7/10/2024 11:30 AM VIET FASTTRACK 3 [facility]   7/24/2024 11:30 AM VIET FASTTRACK 1 [facility]   8/7/2024 11:30 AM VIET FASTTRACK 2 [facility]   8/21/2024 11:30 AM VIET FASTTRACK 3 [facility]   9/4/2024 11:30 AM VIET FASTTRACK 3 [facility]   9/18/2024 11:30 AM VIET FASTTRACK 3 [facility]   10/2/2024 11:30 AM VIET FASTTRACK 2 [facility]

## 2024-05-15 ENCOUNTER — HOSPITAL ENCOUNTER (OUTPATIENT)
Facility: HOSPITAL | Age: 84
Setting detail: INFUSION SERIES
Discharge: HOME | End: 2024-05-15
Payer: MEDICARE

## 2024-05-15 VITALS
DIASTOLIC BLOOD PRESSURE: 48 MMHG | SYSTOLIC BLOOD PRESSURE: 85 MMHG | RESPIRATION RATE: 18 BRPM | HEART RATE: 60 BPM | OXYGEN SATURATION: 100 % | TEMPERATURE: 97.7 F

## 2024-05-15 DIAGNOSIS — E86.0 DEHYDRATION: Primary | ICD-10-CM

## 2024-05-15 DIAGNOSIS — N18.32 STAGE 3B CHRONIC KIDNEY DISEASE (HCC): ICD-10-CM

## 2024-05-15 PROCEDURE — 96361 HYDRATE IV INFUSION ADD-ON: CPT

## 2024-05-15 PROCEDURE — 96360 HYDRATION IV INFUSION INIT: CPT

## 2024-05-15 PROCEDURE — 2580000003 HC RX 258: Performed by: INTERNAL MEDICINE

## 2024-05-15 RX ORDER — DEXTROSE AND SODIUM CHLORIDE 5; .45 G/100ML; G/100ML
INJECTION, SOLUTION INTRAVENOUS ONCE
Start: 2024-05-29 | End: 2024-05-29

## 2024-05-15 RX ORDER — DEXTROSE AND SODIUM CHLORIDE 5; .45 G/100ML; G/100ML
INJECTION, SOLUTION INTRAVENOUS ONCE
Status: COMPLETED | OUTPATIENT
Start: 2024-05-15 | End: 2024-05-15

## 2024-05-15 RX ADMIN — DEXTROSE MONOHYDRATE AND SODIUM CHLORIDE: 5; .45 INJECTION, SOLUTION INTRAVENOUS at 11:58

## 2024-05-15 NOTE — PROGRESS NOTES
[facility] Visit Notes                 Date: May 15, 2024  Name: Emily Banks  MRN: [id number]       : 1940    Pt arrived ambulatory and in no acute distress to [facility] for Hydration (Dextrose 5% & 0.45% NS)  Denies fever, cough, and N/V- denies covid-like symptoms.     IV placed peripherally in Right forearm, 24g w/ positive blood return.   No labs ordered or drawn.   Tolerated procedure well without issue. PIV removed post infusion. Coban and Gauze applied to site.   Patient aware of upcoming appointment. Patient declined post- monitoring time. Education provided.   Patient verbalizes understanding to follow up with provider about low BP readings. She has been running low for the past couple months. See vital signs flowsheets for more details.     Ms. Banks's vitals were reviewed prior to treatment.   Patient Vitals for the past 12 hrs:   Temp Pulse Resp BP SpO2   05/15/24 1600 -- 60 18 (!) 85/48 --   05/15/24 1400 -- 58 16 (!) 89/44 --   05/15/24 1140 -- 58 14 (!) 87/50 --   05/15/24 1135 97.7 °F (36.5 °C) 62 16 (!) 89/51 100 %     Medications Administered         dextrose 5 % and 0.45 % sodium chloride infusion Admin Date  05/15/2024 Action  New Bag Dose   Rate  250 mL/hr Route  IntraVENous Administered By  Shiela Cheatham RN Sarah N Hoang, RN  May 15, 2024

## 2024-05-29 ENCOUNTER — HOSPITAL ENCOUNTER (OUTPATIENT)
Facility: HOSPITAL | Age: 84
Setting detail: INFUSION SERIES
Discharge: HOME | End: 2024-05-29
Payer: MEDICARE

## 2024-05-29 VITALS
TEMPERATURE: 98 F | OXYGEN SATURATION: 97 % | SYSTOLIC BLOOD PRESSURE: 104 MMHG | HEART RATE: 57 BPM | RESPIRATION RATE: 16 BRPM | DIASTOLIC BLOOD PRESSURE: 60 MMHG

## 2024-05-29 DIAGNOSIS — E86.0 DEHYDRATION: Primary | ICD-10-CM

## 2024-05-29 DIAGNOSIS — N18.32 STAGE 3B CHRONIC KIDNEY DISEASE (HCC): ICD-10-CM

## 2024-05-29 PROCEDURE — 2580000003 HC RX 258: Performed by: INTERNAL MEDICINE

## 2024-05-29 PROCEDURE — 96360 HYDRATION IV INFUSION INIT: CPT

## 2024-05-29 PROCEDURE — 96361 HYDRATE IV INFUSION ADD-ON: CPT

## 2024-05-29 RX ORDER — DEXTROSE MONOHYDRATE AND SODIUM CHLORIDE 5; .45 G/100ML; G/100ML
INJECTION, SOLUTION INTRAVENOUS ONCE
Start: 2024-06-12 | End: 2024-06-12

## 2024-05-29 RX ORDER — DEXTROSE MONOHYDRATE AND SODIUM CHLORIDE 5; .45 G/100ML; G/100ML
INJECTION, SOLUTION INTRAVENOUS ONCE
Status: COMPLETED | OUTPATIENT
Start: 2024-05-29 | End: 2024-05-29

## 2024-05-29 RX ADMIN — DEXTROSE MONOHYDRATE AND SODIUM CHLORIDE 1000 ML: 5; .45 INJECTION, SOLUTION INTRAVENOUS at 11:52

## 2024-05-29 ASSESSMENT — PAIN SCALES - GENERAL: PAINLEVEL_OUTOF10: 0

## 2024-05-29 NOTE — PROGRESS NOTES
Pt arrived to [facility] for hydration in stable condition.  PIV to left forearm  with positive blood return.        Vitals:    05/29/24 1138   BP: 104/60   Pulse: 57   Resp: 16   Temp: 98 °F (36.7 °C)   SpO2: 97%     Medications Administered         dextrose 5 % and 0.45 % sodium chloride infusion Admin Date  05/29/2024 Action  New Bag Dose  1,000 mL Rate  250 mL/hr Route  IntraVENous Administered By  Cristine Wright RN              PIV flushed and removed per protocol. Pt tolerated treatment well. D/Cd from [facility] in no distress.  Future Appointments   Date Time Provider Department Center   6/12/2024 11:30 AM VIET FASTTRACK 1 [facility]   6/26/2024 11:30 AM VIET FASTTRACK 3 [facility]   7/10/2024 11:30 AM VIET FASTTRACK 3 [facility]   7/24/2024 11:30 AM VIET FASTTRACK 1 [facility]   8/7/2024 11:30 AM VIET FASTTRACK 2 [facility]   8/21/2024 11:30 AM VIET FASTTRACK 3 [facility]   9/4/2024 11:30 AM VIET FASTTRACK 3 [facility]   9/18/2024 11:30 AM VIET FASTTRACK 3 [facility]   10/2/2024 11:30 AM VIET FASTTRACK 2 [facility]

## 2024-06-12 ENCOUNTER — HOSPITAL ENCOUNTER (OUTPATIENT)
Facility: HOSPITAL | Age: 84
Setting detail: INFUSION SERIES
Discharge: HOME | End: 2024-06-12
Payer: MEDICARE

## 2024-06-12 VITALS
OXYGEN SATURATION: 98 % | HEART RATE: 60 BPM | SYSTOLIC BLOOD PRESSURE: 93 MMHG | DIASTOLIC BLOOD PRESSURE: 57 MMHG | TEMPERATURE: 98.1 F | RESPIRATION RATE: 16 BRPM

## 2024-06-12 DIAGNOSIS — N18.32 STAGE 3B CHRONIC KIDNEY DISEASE (HCC): ICD-10-CM

## 2024-06-12 DIAGNOSIS — E86.0 DEHYDRATION: Primary | ICD-10-CM

## 2024-06-12 PROCEDURE — 96360 HYDRATION IV INFUSION INIT: CPT

## 2024-06-12 PROCEDURE — 96361 HYDRATE IV INFUSION ADD-ON: CPT

## 2024-06-12 PROCEDURE — 2580000003 HC RX 258: Performed by: INTERNAL MEDICINE

## 2024-06-12 RX ORDER — DEXTROSE MONOHYDRATE AND SODIUM CHLORIDE 5; .45 G/100ML; G/100ML
INJECTION, SOLUTION INTRAVENOUS ONCE
Status: COMPLETED | OUTPATIENT
Start: 2024-06-12 | End: 2024-06-12

## 2024-06-12 RX ORDER — DEXTROSE MONOHYDRATE AND SODIUM CHLORIDE 5; .45 G/100ML; G/100ML
INJECTION, SOLUTION INTRAVENOUS ONCE
Start: 2024-06-26 | End: 2024-06-26

## 2024-06-12 RX ADMIN — DEXTROSE MONOHYDRATE AND SODIUM CHLORIDE 1000 ML: 5; .45 INJECTION, SOLUTION INTRAVENOUS at 12:20

## 2024-06-12 ASSESSMENT — PAIN SCALES - GENERAL: PAINLEVEL_OUTOF10: 0

## 2024-06-12 NOTE — PROGRESS NOTES
Pt arrived to [facility] for hydration in stable condition.  PIV to right forearm with positive blood return.        Vitals:    06/12/24 1621   BP: (!) 93/57   Pulse: 60   Resp:    Temp:    SpO2:      Medications Administered         dextrose 5 % and 0.45 % sodium chloride infusion Admin Date  06/12/2024 Action  New Bag Dose  1,000 mL Rate  250 mL/hr Route  IntraVENous Administered By  Cristine Wright RN              PIV flushed and removed per protocol. Pt tolerated treatment well. D/Cd from [facility] in no distress.  Future Appointments   Date Time Provider Department Center   6/26/2024 11:30 AM VIET FASTTRACK 3 [facility]   7/10/2024 11:30 AM VIET FASTTRACK 3 [facility]   7/24/2024 11:30 AM VIET FASTTRACK 1 [facility]   8/7/2024 11:30 AM VIET FASTTRACK 2 [facility]   8/21/2024 11:30 AM VIET FASTTRACK 3 [facility]   9/4/2024 11:30 AM VIET FASTTRACK 3 [facility]   9/18/2024 11:30 AM VIET FASTTRACK 3 [facility]   10/2/2024 11:30 AM VIET FASTTRACK 2 [facility]

## 2024-06-26 ENCOUNTER — HOSPITAL ENCOUNTER (OUTPATIENT)
Facility: HOSPITAL | Age: 84
Setting detail: INFUSION SERIES
Discharge: HOME | End: 2024-06-26
Payer: MEDICARE

## 2024-06-26 VITALS
RESPIRATION RATE: 18 BRPM | TEMPERATURE: 98 F | OXYGEN SATURATION: 99 % | SYSTOLIC BLOOD PRESSURE: 114 MMHG | DIASTOLIC BLOOD PRESSURE: 60 MMHG | HEART RATE: 52 BPM

## 2024-06-26 DIAGNOSIS — N18.32 STAGE 3B CHRONIC KIDNEY DISEASE (HCC): ICD-10-CM

## 2024-06-26 DIAGNOSIS — E86.0 DEHYDRATION: Primary | ICD-10-CM

## 2024-06-26 PROCEDURE — 2580000003 HC RX 258: Performed by: INTERNAL MEDICINE

## 2024-06-26 PROCEDURE — 96361 HYDRATE IV INFUSION ADD-ON: CPT

## 2024-06-26 PROCEDURE — 96360 HYDRATION IV INFUSION INIT: CPT

## 2024-06-26 RX ORDER — DEXTROSE MONOHYDRATE AND SODIUM CHLORIDE 5; .45 G/100ML; G/100ML
INJECTION, SOLUTION INTRAVENOUS ONCE
Start: 2024-07-10 | End: 2024-07-10

## 2024-06-26 RX ORDER — DEXTROSE MONOHYDRATE AND SODIUM CHLORIDE 5; .45 G/100ML; G/100ML
INJECTION, SOLUTION INTRAVENOUS ONCE
Status: COMPLETED | OUTPATIENT
Start: 2024-06-26 | End: 2024-06-26

## 2024-06-26 RX ADMIN — DEXTROSE AND SODIUM CHLORIDE: 5; 450 INJECTION, SOLUTION INTRAVENOUS at 11:33

## 2024-06-26 NOTE — PROGRESS NOTES
[facility] Progress Note    Date: 2024    Name: Emily Banks    MRN: [id number]         : 1940    Ms. Banks Arrived ambulatory and in no distress for IV Hydration.      Assessment was completed and documented in flowsheets. No acute concerns at this time. 24G IV placed without difficulty, positive blood return noted.    Ms. Banks's vital signs for this visit.  Patient Vitals for the past 24 hrs:   BP Temp Temp src Pulse Resp SpO2   24 1536 114/60 -- -- 52 -- --   24 1115 (!) 100/56 98 °F (36.7 °C) Temporal 67 18 99 %       Medications given:   Medications Administered         dextrose 5 % and 0.45 % sodium chloride infusion Admin Date  2024 Action  New Bag Dose   Rate  250 mL/hr Route  IntraVENous Administered By  Lynne Askew, CINDY            Ms. Banks tolerated the infusion, and had no complaints.    PIV flushed and removed after completion of infusion. 2x2 and coban placed.     [facility]. [facility] in [facility] condition and is aware of future appointments.     Future Appointments   Date Time Provider Department Center   7/10/2024 11:30 AM VIET FASTTRACK 3 [facility]   2024 11:30 AM VIET FASTTRACK 1 [facility]   2024 11:30 AM VIET FASTTRACK 2 [facility]   2024 11:30 AM VIET FASTTRACK 3 [facility]   2024 11:30 AM VIET FASTTRACK 3 [facility]   2024 11:30 AM VIET FASTTRACK 3 [facility]   10/2/2024 11:30 AM VIET FASTTRACK 2 [facility]       LYNNE ASKEW RN  2024  3:41 PM

## 2024-07-10 ENCOUNTER — HOSPITAL ENCOUNTER (OUTPATIENT)
Facility: HOSPITAL | Age: 84
Setting detail: INFUSION SERIES
Discharge: HOME | End: 2024-07-10
Payer: MEDICARE

## 2024-07-10 VITALS
TEMPERATURE: 98 F | HEART RATE: 57 BPM | DIASTOLIC BLOOD PRESSURE: 45 MMHG | RESPIRATION RATE: 16 BRPM | OXYGEN SATURATION: 99 % | SYSTOLIC BLOOD PRESSURE: 96 MMHG

## 2024-07-10 DIAGNOSIS — E86.0 DEHYDRATION: Primary | ICD-10-CM

## 2024-07-10 DIAGNOSIS — N18.32 STAGE 3B CHRONIC KIDNEY DISEASE (HCC): ICD-10-CM

## 2024-07-10 PROCEDURE — 96360 HYDRATION IV INFUSION INIT: CPT

## 2024-07-10 PROCEDURE — 2580000003 HC RX 258: Performed by: INTERNAL MEDICINE

## 2024-07-10 PROCEDURE — 96361 HYDRATE IV INFUSION ADD-ON: CPT

## 2024-07-10 RX ORDER — DEXTROSE MONOHYDRATE AND SODIUM CHLORIDE 5; .45 G/100ML; G/100ML
INJECTION, SOLUTION INTRAVENOUS ONCE
Start: 2024-07-24 | End: 2024-07-24

## 2024-07-10 RX ORDER — DEXTROSE MONOHYDRATE AND SODIUM CHLORIDE 5; .45 G/100ML; G/100ML
INJECTION, SOLUTION INTRAVENOUS ONCE
Status: COMPLETED | OUTPATIENT
Start: 2024-07-10 | End: 2024-07-10

## 2024-07-10 RX ADMIN — DEXTROSE MONOHYDRATE AND SODIUM CHLORIDE 250 ML/HR: 5; .45 INJECTION, SOLUTION INTRAVENOUS at 11:55

## 2024-07-10 ASSESSMENT — PAIN SCALES - GENERAL: PAINLEVEL_OUTOF10: 0

## 2024-07-10 NOTE — PROGRESS NOTES
Pt arrived to [facility] for hydration in stable condition.  PIV to left wrist with positive blood return.        Vitals:    07/10/24 1600   BP: (!) 96/45   Pulse: 57   Resp:    Temp:    SpO2:      Medications Administered         dextrose 5 % and 0.45 % sodium chloride infusion Admin Date  07/10/2024 Action  New Bag Dose  250 mL/hr Rate  250 mL/hr Route  IntraVENous Administered By  Cristine Wright RN              PIV flushed and removed per protocol. Pt tolerated treatment well. D/Cd from [facility] in no distress.  Future Appointments   Date Time Provider Department Center   7/24/2024 11:30 AM VIET FASTTRACK 1 [facility]   8/7/2024 11:30 AM VIET FASTTRACK 2 [facility]   8/21/2024 11:30 AM VIET FASTTRACK 3 [facility]   9/4/2024 11:30 AM VIET FASTTRACK 3 [facility]   9/18/2024 11:30 AM VIET FASTTRACK 3 [facility]   10/2/2024 11:30 AM VIET FASTTRACK 2 [facility]

## 2024-07-24 ENCOUNTER — HOSPITAL ENCOUNTER (OUTPATIENT)
Facility: HOSPITAL | Age: 84
Setting detail: INFUSION SERIES
Discharge: HOME | End: 2024-07-24
Payer: MEDICARE

## 2024-07-24 VITALS
SYSTOLIC BLOOD PRESSURE: 92 MMHG | TEMPERATURE: 97.6 F | HEART RATE: 55 BPM | DIASTOLIC BLOOD PRESSURE: 46 MMHG | RESPIRATION RATE: 16 BRPM

## 2024-07-24 DIAGNOSIS — E86.0 DEHYDRATION: Primary | ICD-10-CM

## 2024-07-24 DIAGNOSIS — N18.32 STAGE 3B CHRONIC KIDNEY DISEASE (HCC): ICD-10-CM

## 2024-07-24 PROCEDURE — 96361 HYDRATE IV INFUSION ADD-ON: CPT

## 2024-07-24 PROCEDURE — 96360 HYDRATION IV INFUSION INIT: CPT

## 2024-07-24 PROCEDURE — 2580000003 HC RX 258: Performed by: INTERNAL MEDICINE

## 2024-07-24 RX ORDER — DEXTROSE MONOHYDRATE AND SODIUM CHLORIDE 5; .45 G/100ML; G/100ML
INJECTION, SOLUTION INTRAVENOUS ONCE
Start: 2024-08-07 | End: 2024-08-07

## 2024-07-24 RX ORDER — DEXTROSE MONOHYDRATE AND SODIUM CHLORIDE 5; .45 G/100ML; G/100ML
INJECTION, SOLUTION INTRAVENOUS ONCE
Status: COMPLETED | OUTPATIENT
Start: 2024-07-24 | End: 2024-07-24

## 2024-07-24 RX ADMIN — DEXTROSE AND SODIUM CHLORIDE: 5; 450 INJECTION, SOLUTION INTRAVENOUS at 11:40

## 2024-07-24 ASSESSMENT — PAIN SCALES - GENERAL: PAINLEVEL_OUTOF10: 0

## 2024-07-24 NOTE — PROGRESS NOTES
[facility] Short Note                       Date: 2024    Name: Emily Banks    MRN: [id number]         : 1940      11:30 Pt admit to [facility] for HYDRATION ambulatory in stable condition. Assessment completed. No new concerns voiced.    Ms. Banks's vitals were reviewed prior to and after treatment.   Patient Vitals for the past 12 hrs:   Temp Pulse Resp BP   24 1522 -- 55 -- (!) 92/46   24 1131 97.6 °F (36.4 °C) 60 16 95/61       Medications given: PIV right forearm  Medications Administered         dextrose 5 % and 0.45 % sodium chloride infusion Admin Date  2024 Action  New Bag Dose   Rate  250 mL/hr Route  IntraVENous Administered By  Irma Velasco RN              Ms. Banks tolerated the infusion, and had no complaints.    [facility]. [facility] in [facility] condition.     Future Appointments   Date Time Provider Department Center   2024 11:30 AM VIET FASTTRACK 2 [facility]   2024 11:30 AM VIET FASTTRACK 3 [facility]   2024 11:30 AM VIET FASTTRACK 3 [facility]   2024 11:30 AM VIET FASTTRACK 3 [facility]   10/2/2024 11:30 AM VIET FASTTRACK 2 [facility]       Irma Velasco RN  2024  3:22 PM

## 2024-08-07 ENCOUNTER — HOSPITAL ENCOUNTER (OUTPATIENT)
Facility: HOSPITAL | Age: 84
Setting detail: INFUSION SERIES
Discharge: HOME | End: 2024-08-07
Payer: MEDICARE

## 2024-08-07 VITALS
HEART RATE: 53 BPM | DIASTOLIC BLOOD PRESSURE: 56 MMHG | TEMPERATURE: 97.5 F | SYSTOLIC BLOOD PRESSURE: 91 MMHG | RESPIRATION RATE: 18 BRPM

## 2024-08-07 DIAGNOSIS — E86.0 DEHYDRATION: Primary | ICD-10-CM

## 2024-08-07 DIAGNOSIS — N18.32 STAGE 3B CHRONIC KIDNEY DISEASE (HCC): ICD-10-CM

## 2024-08-07 PROCEDURE — 2580000003 HC RX 258: Performed by: INTERNAL MEDICINE

## 2024-08-07 PROCEDURE — 96361 HYDRATE IV INFUSION ADD-ON: CPT

## 2024-08-07 PROCEDURE — 96360 HYDRATION IV INFUSION INIT: CPT

## 2024-08-07 RX ORDER — DEXTROSE MONOHYDRATE AND SODIUM CHLORIDE 5; .45 G/100ML; G/100ML
INJECTION, SOLUTION INTRAVENOUS ONCE
Start: 2024-08-21 | End: 2024-08-21

## 2024-08-07 RX ORDER — DEXTROSE MONOHYDRATE AND SODIUM CHLORIDE 5; .45 G/100ML; G/100ML
INJECTION, SOLUTION INTRAVENOUS ONCE
Status: COMPLETED | OUTPATIENT
Start: 2024-08-07 | End: 2024-08-07

## 2024-08-07 RX ADMIN — DEXTROSE AND SODIUM CHLORIDE: 5; 450 INJECTION, SOLUTION INTRAVENOUS at 11:42

## 2024-08-07 ASSESSMENT — PAIN SCALES - GENERAL: PAINLEVEL_OUTOF10: 0

## 2024-08-07 NOTE — PROGRESS NOTES
[facility] Short Note                       Date: 2024    Name: Emily Banks    MRN: [id number]         : 1940    Pt admit to [facility] for Hydration ambulatory in stable condition. Assessment completed. No new concerns voiced.      Ms. Banks's vitals were reviewed prior to treatment.   Patient Vitals for the past 12 hrs:   Temp Pulse Resp BP   24 1609 -- 53 -- (!) 91/56   24 1133 97.5 °F (36.4 °C) 58 18 100/62       Peripheral IV with positive blood return.       Medications Administered         dextrose 5 % and 0.45 % sodium chloride infusion Admin Date  2024 Action  New Bag Dose   Rate  250 mL/hr Route  IntraVENous Administered By  Raphael Goins RN          Pt tolerated treatment well. D/c home ambulatory in no distress. Pt aware of next appointment scheduled.    Future Appointments   Date Time Provider Department Center   2024 11:30 AM VIET FASTTRACK 3 [facility]   2024 11:30 AM VIET FASTTRACK 3 [facility]   2024 11:30 AM VIET FASTTRACK 3 [facility]   10/2/2024 11:30 [facility]       RAPHAEL GOINS RN  2024  4:12 PM

## 2024-08-21 ENCOUNTER — HOSPITAL ENCOUNTER (OUTPATIENT)
Facility: HOSPITAL | Age: 84
Setting detail: INFUSION SERIES
Discharge: HOME | End: 2024-08-21
Payer: MEDICARE

## 2024-08-21 VITALS
TEMPERATURE: 98 F | SYSTOLIC BLOOD PRESSURE: 104 MMHG | RESPIRATION RATE: 16 BRPM | OXYGEN SATURATION: 92 % | DIASTOLIC BLOOD PRESSURE: 54 MMHG | HEART RATE: 57 BPM

## 2024-08-21 DIAGNOSIS — E86.0 DEHYDRATION: Primary | ICD-10-CM

## 2024-08-21 DIAGNOSIS — N18.32 STAGE 3B CHRONIC KIDNEY DISEASE (HCC): ICD-10-CM

## 2024-08-21 PROCEDURE — 96360 HYDRATION IV INFUSION INIT: CPT

## 2024-08-21 PROCEDURE — 2580000003 HC RX 258: Performed by: FAMILY MEDICINE

## 2024-08-21 PROCEDURE — 96361 HYDRATE IV INFUSION ADD-ON: CPT

## 2024-08-21 RX ORDER — DEXTROSE MONOHYDRATE AND SODIUM CHLORIDE 5; .45 G/100ML; G/100ML
INJECTION, SOLUTION INTRAVENOUS ONCE
Start: 2024-09-04 | End: 2024-09-04

## 2024-08-21 RX ORDER — DEXTROSE MONOHYDRATE AND SODIUM CHLORIDE 5; .45 G/100ML; G/100ML
INJECTION, SOLUTION INTRAVENOUS ONCE
Status: COMPLETED | OUTPATIENT
Start: 2024-08-21 | End: 2024-08-21

## 2024-08-21 RX ADMIN — DEXTROSE AND SODIUM CHLORIDE 1000 ML: 5; 450 INJECTION, SOLUTION INTRAVENOUS at 11:59

## 2024-08-21 ASSESSMENT — PAIN SCALES - GENERAL: PAINLEVEL_OUTOF10: 0

## 2024-08-21 NOTE — PROGRESS NOTES
Pt arrived to [facility] for hydration in stable condition.  PIV to left wrist with positive blood return.        Vitals:    08/21/24 1143   BP: (!) 104/54   Pulse: 57   Resp: 16   Temp: 98 °F (36.7 °C)   SpO2: 92%     Medications Administered         dextrose 5 % and 0.45 % sodium chloride infusion Admin Date  08/21/2024 Action  New Bag Dose  1,000 mL Rate  250 mL/hr Route  IntraVENous Documented By  Cristine Wright, RN              PIV flushed and removed per protocol. Pt tolerated treatment well. D/Cd from [facility] in no distress.  Future Appointments   Date Time Provider Department Center   9/4/2024 11:30 [facility]   9/18/2024 11:30 AM [facility]   10/2/2024 11:30 AM VIET FASTTRACK 2 [facility]

## 2024-08-27 ENCOUNTER — HOSPITAL ENCOUNTER (INPATIENT)
Facility: HOSPITAL | Age: 84
LOS: 3 days | Discharge: HOME | End: 2024-08-30
Attending: EMERGENCY MEDICINE | Admitting: SURGERY
Payer: MEDICARE

## 2024-08-27 ENCOUNTER — APPOINTMENT (OUTPATIENT)
Facility: HOSPITAL | Age: 84
End: 2024-08-27
Payer: MEDICARE

## 2024-08-27 DIAGNOSIS — K56.609 SBO (SMALL BOWEL OBSTRUCTION) (HCC): Primary | ICD-10-CM

## 2024-08-27 DIAGNOSIS — N17.9 AKI (ACUTE KIDNEY INJURY) (HCC): ICD-10-CM

## 2024-08-27 PROBLEM — K56.50 SMALL BOWEL OBSTRUCTION DUE TO ADHESIONS (HCC): Status: ACTIVE | Noted: 2024-08-27

## 2024-08-27 LAB
ALBUMIN SERPL-MCNC: 3.9 G/DL (ref 3.5–5)
ALBUMIN/GLOB SERPL: 1 (ref 1.1–2.2)
ALP SERPL-CCNC: 40 U/L (ref 45–117)
ALT SERPL-CCNC: 26 U/L (ref 12–78)
ANION GAP SERPL CALC-SCNC: 6 MMOL/L (ref 5–15)
AST SERPL-CCNC: 36 U/L (ref 15–37)
BASOPHILS # BLD: 0.1 K/UL (ref 0–0.1)
BASOPHILS NFR BLD: 1 % (ref 0–1)
BILIRUB SERPL-MCNC: 0.5 MG/DL (ref 0.2–1)
BUN SERPL-MCNC: 22 MG/DL (ref 6–20)
BUN/CREAT SERPL: 15 (ref 12–20)
CALCIUM SERPL-MCNC: 10.5 MG/DL (ref 8.5–10.1)
CHLORIDE SERPL-SCNC: 98 MMOL/L (ref 97–108)
CO2 SERPL-SCNC: 31 MMOL/L (ref 21–32)
COMMENT:: NORMAL
CREAT SERPL-MCNC: 1.42 MG/DL (ref 0.55–1.02)
DIFFERENTIAL METHOD BLD: NORMAL
EOSINOPHIL # BLD: 0.1 K/UL (ref 0–0.4)
EOSINOPHIL NFR BLD: 1 % (ref 0–7)
ERYTHROCYTE [DISTWIDTH] IN BLOOD BY AUTOMATED COUNT: 12.7 % (ref 11.5–14.5)
GLOBULIN SER CALC-MCNC: 4 G/DL (ref 2–4)
GLUCOSE SERPL-MCNC: 115 MG/DL (ref 65–100)
HCT VFR BLD AUTO: 43.4 % (ref 35–47)
HGB BLD-MCNC: 14.4 G/DL (ref 11.5–16)
IMM GRANULOCYTES # BLD AUTO: 0 K/UL (ref 0–0.04)
IMM GRANULOCYTES NFR BLD AUTO: 0 % (ref 0–0.5)
LACTATE BLD-SCNC: 1.3 MMOL/L (ref 0.4–2)
LIPASE SERPL-CCNC: 63 U/L (ref 13–75)
LYMPHOCYTES # BLD: 1.7 K/UL (ref 0.8–3.5)
LYMPHOCYTES NFR BLD: 17 % (ref 12–49)
MCH RBC QN AUTO: 30.6 PG (ref 26–34)
MCHC RBC AUTO-ENTMCNC: 33.2 G/DL (ref 30–36.5)
MCV RBC AUTO: 92.1 FL (ref 80–99)
MONOCYTES # BLD: 0.9 K/UL (ref 0–1)
MONOCYTES NFR BLD: 9 % (ref 5–13)
NEUTS SEG # BLD: 7.4 K/UL (ref 1.8–8)
NEUTS SEG NFR BLD: 72 % (ref 32–75)
NRBC # BLD: 0 K/UL (ref 0–0.01)
NRBC BLD-RTO: 0 PER 100 WBC
PLATELET # BLD AUTO: 208 K/UL (ref 150–400)
PMV BLD AUTO: 11.9 FL (ref 8.9–12.9)
POTASSIUM SERPL-SCNC: 4.1 MMOL/L (ref 3.5–5.1)
PROT SERPL-MCNC: 7.9 G/DL (ref 6.4–8.2)
RBC # BLD AUTO: 4.71 M/UL (ref 3.8–5.2)
SODIUM SERPL-SCNC: 135 MMOL/L (ref 136–145)
SPECIMEN HOLD: NORMAL
WBC # BLD AUTO: 10.2 K/UL (ref 3.6–11)

## 2024-08-27 PROCEDURE — 2580000003 HC RX 258: Performed by: EMERGENCY MEDICINE

## 2024-08-27 PROCEDURE — 1100000000 HC RM PRIVATE

## 2024-08-27 PROCEDURE — 6360000002 HC RX W HCPCS: Performed by: EMERGENCY MEDICINE

## 2024-08-27 PROCEDURE — 80053 COMPREHEN METABOLIC PANEL: CPT

## 2024-08-27 PROCEDURE — 81001 URINALYSIS AUTO W/SCOPE: CPT

## 2024-08-27 PROCEDURE — 85025 COMPLETE CBC W/AUTO DIFF WBC: CPT

## 2024-08-27 PROCEDURE — 74177 CT ABD & PELVIS W/CONTRAST: CPT

## 2024-08-27 PROCEDURE — 99285 EMERGENCY DEPT VISIT HI MDM: CPT

## 2024-08-27 PROCEDURE — 6360000004 HC RX CONTRAST MEDICATION: Performed by: RADIOLOGY

## 2024-08-27 PROCEDURE — 83605 ASSAY OF LACTIC ACID: CPT

## 2024-08-27 PROCEDURE — 96375 TX/PRO/DX INJ NEW DRUG ADDON: CPT

## 2024-08-27 PROCEDURE — 36415 COLL VENOUS BLD VENIPUNCTURE: CPT

## 2024-08-27 PROCEDURE — 96374 THER/PROPH/DIAG INJ IV PUSH: CPT

## 2024-08-27 PROCEDURE — 83690 ASSAY OF LIPASE: CPT

## 2024-08-27 PROCEDURE — 96361 HYDRATE IV INFUSION ADD-ON: CPT

## 2024-08-27 RX ORDER — LEVOTHYROXINE SODIUM 25 UG/1
25 TABLET ORAL DAILY
Status: DISCONTINUED | OUTPATIENT
Start: 2024-08-28 | End: 2024-08-30 | Stop reason: HOSPADM

## 2024-08-27 RX ORDER — ACETAMINOPHEN 325 MG/1
650 TABLET ORAL EVERY 6 HOURS PRN
Status: DISCONTINUED | OUTPATIENT
Start: 2024-08-27 | End: 2024-08-30 | Stop reason: HOSPADM

## 2024-08-27 RX ORDER — SODIUM CHLORIDE 9 MG/ML
INJECTION, SOLUTION INTRAVENOUS CONTINUOUS
Status: DISCONTINUED | OUTPATIENT
Start: 2024-08-27 | End: 2024-08-27

## 2024-08-27 RX ORDER — ONDANSETRON 2 MG/ML
4 INJECTION INTRAMUSCULAR; INTRAVENOUS ONCE
Status: COMPLETED | OUTPATIENT
Start: 2024-08-27 | End: 2024-08-27

## 2024-08-27 RX ORDER — MORPHINE SULFATE 2 MG/ML
2 INJECTION, SOLUTION INTRAMUSCULAR; INTRAVENOUS
Status: DISCONTINUED | OUTPATIENT
Start: 2024-08-27 | End: 2024-08-30 | Stop reason: HOSPADM

## 2024-08-27 RX ORDER — SODIUM CHLORIDE, SODIUM LACTATE, POTASSIUM CHLORIDE, CALCIUM CHLORIDE 600; 310; 30; 20 MG/100ML; MG/100ML; MG/100ML; MG/100ML
INJECTION, SOLUTION INTRAVENOUS CONTINUOUS
Status: DISCONTINUED | OUTPATIENT
Start: 2024-08-27 | End: 2024-08-30 | Stop reason: HOSPADM

## 2024-08-27 RX ORDER — ASPIRIN 81 MG/1
81 TABLET ORAL DAILY
Status: DISCONTINUED | OUTPATIENT
Start: 2024-08-28 | End: 2024-08-30 | Stop reason: HOSPADM

## 2024-08-27 RX ORDER — ONDANSETRON 2 MG/ML
4 INJECTION INTRAMUSCULAR; INTRAVENOUS EVERY 4 HOURS PRN
Status: DISCONTINUED | OUTPATIENT
Start: 2024-08-27 | End: 2024-08-30 | Stop reason: HOSPADM

## 2024-08-27 RX ORDER — ESCITALOPRAM OXALATE 10 MG/1
10 TABLET ORAL DAILY
Status: DISCONTINUED | OUTPATIENT
Start: 2024-08-28 | End: 2024-08-29

## 2024-08-27 RX ORDER — DIAZEPAM 5 MG
5 TABLET ORAL EVERY 6 HOURS PRN
Status: DISCONTINUED | OUTPATIENT
Start: 2024-08-27 | End: 2024-08-30 | Stop reason: HOSPADM

## 2024-08-27 RX ORDER — IOPAMIDOL 755 MG/ML
100 INJECTION, SOLUTION INTRAVASCULAR
Status: COMPLETED | OUTPATIENT
Start: 2024-08-27 | End: 2024-08-27

## 2024-08-27 RX ORDER — 0.9 % SODIUM CHLORIDE 0.9 %
1000 INTRAVENOUS SOLUTION INTRAVENOUS ONCE
Status: COMPLETED | OUTPATIENT
Start: 2024-08-27 | End: 2024-08-27

## 2024-08-27 RX ORDER — MORPHINE SULFATE 2 MG/ML
2 INJECTION, SOLUTION INTRAMUSCULAR; INTRAVENOUS ONCE
Status: COMPLETED | OUTPATIENT
Start: 2024-08-27 | End: 2024-08-27

## 2024-08-27 RX ORDER — POLYETHYLENE GLYCOL 3350 17 G/17G
17 POWDER, FOR SOLUTION ORAL DAILY
Status: DISCONTINUED | OUTPATIENT
Start: 2024-08-28 | End: 2024-08-30 | Stop reason: HOSPADM

## 2024-08-27 RX ADMIN — SODIUM CHLORIDE 1000 ML: 9 INJECTION, SOLUTION INTRAVENOUS at 19:18

## 2024-08-27 RX ADMIN — MORPHINE SULFATE 2 MG: 2 INJECTION, SOLUTION INTRAMUSCULAR; INTRAVENOUS at 20:20

## 2024-08-27 RX ADMIN — IOPAMIDOL 100 ML: 755 INJECTION, SOLUTION INTRAVENOUS at 20:26

## 2024-08-27 RX ADMIN — ONDANSETRON 4 MG: 2 INJECTION INTRAMUSCULAR; INTRAVENOUS at 20:20

## 2024-08-27 ASSESSMENT — ENCOUNTER SYMPTOMS
COUGH: 0
ANAL BLEEDING: 0
ABDOMINAL DISTENTION: 0
NAUSEA: 1
CONSTIPATION: 1
ABDOMINAL PAIN: 1
SHORTNESS OF BREATH: 0
DIARRHEA: 0
VOMITING: 0
BLOOD IN STOOL: 0

## 2024-08-27 ASSESSMENT — PAIN SCALES - GENERAL
PAINLEVEL_OUTOF10: 6
PAINLEVEL_OUTOF10: 6

## 2024-08-27 ASSESSMENT — PAIN - FUNCTIONAL ASSESSMENT: PAIN_FUNCTIONAL_ASSESSMENT: 0-10

## 2024-08-27 ASSESSMENT — PAIN DESCRIPTION - DESCRIPTORS: DESCRIPTORS: ACHING

## 2024-08-27 ASSESSMENT — PAIN DESCRIPTION - LOCATION: LOCATION: ABDOMEN

## 2024-08-27 ASSESSMENT — PAIN DESCRIPTION - PAIN TYPE: TYPE: ACUTE PAIN

## 2024-08-27 NOTE — ED TRIAGE NOTES
Pt c/o abd cramping that started this afternoon. Pt reports nausea but is unable to vomit or have a bowel movement. +concern for dehydration

## 2024-08-27 NOTE — ED PROVIDER NOTES
CONSULTS:  IP CONSULT TO GENERAL SURGERY    PROCEDURES:  Unless otherwise noted below, none     Procedures      FINAL IMPRESSION      1. SBO (small bowel obstruction) ([facility])    2. HUSSAIN (acute kidney injury) ([facility])          DISPOSITION/PLAN   DISPOSITION Admitted 08/27/2024 09:29:37 PM      PATIENT REFERRED TO:  No follow-up provider specified.    DISCHARGE MEDICATIONS:  Current Discharge Medication List            Dominguez Shah MD (electronically signed)  Emergency Attending Physician     Edgar Serve Consult for Admission  11:42 AM    ED Room Number: 504/02  Patient Name and age:  Emily Banks 84 y.o.  female  Working Diagnosis:   1. SBO (small bowel obstruction) ([facility])    2. HUSSAIN (acute kidney injury) ([facility])               Dominguez Shah MD  08/28/24 1142

## 2024-08-28 ENCOUNTER — APPOINTMENT (OUTPATIENT)
Facility: HOSPITAL | Age: 84
End: 2024-08-28
Payer: MEDICARE

## 2024-08-28 LAB
ANION GAP SERPL CALC-SCNC: 4 MMOL/L (ref 5–15)
APPEARANCE UR: ABNORMAL
BACTERIA URNS QL MICRO: NEGATIVE /HPF
BILIRUB UR QL: NEGATIVE
BUN SERPL-MCNC: 18 MG/DL (ref 6–20)
BUN/CREAT SERPL: 14 (ref 12–20)
CALCIUM SERPL-MCNC: 8.8 MG/DL (ref 8.5–10.1)
CHLORIDE SERPL-SCNC: 106 MMOL/L (ref 97–108)
CO2 SERPL-SCNC: 30 MMOL/L (ref 21–32)
COLOR UR: ABNORMAL
CREAT SERPL-MCNC: 1.26 MG/DL (ref 0.55–1.02)
EPITH CASTS URNS QL MICRO: ABNORMAL /LPF
ERYTHROCYTE [DISTWIDTH] IN BLOOD BY AUTOMATED COUNT: 13 % (ref 11.5–14.5)
GLUCOSE SERPL-MCNC: 96 MG/DL (ref 65–100)
GLUCOSE UR STRIP.AUTO-MCNC: NEGATIVE MG/DL
HCT VFR BLD AUTO: 37.1 % (ref 35–47)
HGB BLD-MCNC: 12 G/DL (ref 11.5–16)
HGB UR QL STRIP: ABNORMAL
KETONES UR QL STRIP.AUTO: 15 MG/DL
LEUKOCYTE ESTERASE UR QL STRIP.AUTO: ABNORMAL
MCH RBC QN AUTO: 30.6 PG (ref 26–34)
MCHC RBC AUTO-ENTMCNC: 32.3 G/DL (ref 30–36.5)
MCV RBC AUTO: 94.6 FL (ref 80–99)
NITRITE UR QL STRIP.AUTO: NEGATIVE
NRBC # BLD: 0 K/UL (ref 0–0.01)
NRBC BLD-RTO: 0 PER 100 WBC
PH UR STRIP: 5 (ref 5–8)
PLATELET # BLD AUTO: 180 K/UL (ref 150–400)
PMV BLD AUTO: 11.4 FL (ref 8.9–12.9)
POTASSIUM SERPL-SCNC: 4.2 MMOL/L (ref 3.5–5.1)
PROT UR STRIP-MCNC: NEGATIVE MG/DL
RBC # BLD AUTO: 3.92 M/UL (ref 3.8–5.2)
RBC #/AREA URNS HPF: ABNORMAL /HPF (ref 0–5)
SODIUM SERPL-SCNC: 140 MMOL/L (ref 136–145)
SP GR UR REFRACTOMETRY: 1.02 (ref 1–1.03)
SPECIMEN HOLD: NORMAL
UROBILINOGEN UR QL STRIP.AUTO: 0.2 EU/DL (ref 0.2–1)
WBC # BLD AUTO: 6.9 K/UL (ref 3.6–11)
WBC URNS QL MICRO: ABNORMAL /HPF (ref 0–4)

## 2024-08-28 PROCEDURE — 6360000002 HC RX W HCPCS: Performed by: SURGERY

## 2024-08-28 PROCEDURE — 85027 COMPLETE CBC AUTOMATED: CPT

## 2024-08-28 PROCEDURE — 80048 BASIC METABOLIC PNL TOTAL CA: CPT

## 2024-08-28 PROCEDURE — 74019 RADEX ABDOMEN 2 VIEWS: CPT

## 2024-08-28 PROCEDURE — 99231 SBSQ HOSP IP/OBS SF/LOW 25: CPT | Performed by: NURSE PRACTITIONER

## 2024-08-28 PROCEDURE — 2580000003 HC RX 258: Performed by: SURGERY

## 2024-08-28 PROCEDURE — 6370000000 HC RX 637 (ALT 250 FOR IP): Performed by: SURGERY

## 2024-08-28 PROCEDURE — 1100000000 HC RM PRIVATE

## 2024-08-28 RX ORDER — METOCLOPRAMIDE HYDROCHLORIDE 5 MG/ML
2.5 INJECTION INTRAMUSCULAR; INTRAVENOUS EVERY 6 HOURS
Status: DISCONTINUED | OUTPATIENT
Start: 2024-08-28 | End: 2024-08-30 | Stop reason: HOSPADM

## 2024-08-28 RX ORDER — METOCLOPRAMIDE HYDROCHLORIDE 5 MG/ML
5 INJECTION INTRAMUSCULAR; INTRAVENOUS EVERY 6 HOURS
Status: DISCONTINUED | OUTPATIENT
Start: 2024-08-28 | End: 2024-08-28

## 2024-08-28 RX ADMIN — SODIUM CHLORIDE, PRESERVATIVE FREE 40 MG: 5 INJECTION INTRAVENOUS at 08:25

## 2024-08-28 RX ADMIN — ASPIRIN 81 MG: 81 TABLET, COATED ORAL at 08:24

## 2024-08-28 RX ADMIN — ONDANSETRON 4 MG: 2 INJECTION INTRAMUSCULAR; INTRAVENOUS at 08:57

## 2024-08-28 RX ADMIN — MORPHINE SULFATE 2 MG: 2 INJECTION, SOLUTION INTRAMUSCULAR; INTRAVENOUS at 04:35

## 2024-08-28 RX ADMIN — POLYETHYLENE GLYCOL 3350 17 G: 17 POWDER, FOR SOLUTION ORAL at 08:25

## 2024-08-28 RX ADMIN — LEVOTHYROXINE SODIUM 25 MCG: 0.03 TABLET ORAL at 08:33

## 2024-08-28 RX ADMIN — SODIUM CHLORIDE, POTASSIUM CHLORIDE, SODIUM LACTATE AND CALCIUM CHLORIDE: 600; 310; 30; 20 INJECTION, SOLUTION INTRAVENOUS at 00:11

## 2024-08-28 RX ADMIN — MORPHINE SULFATE 2 MG: 2 INJECTION, SOLUTION INTRAMUSCULAR; INTRAVENOUS at 08:49

## 2024-08-28 RX ADMIN — METOCLOPRAMIDE 5 MG: 5 INJECTION, SOLUTION INTRAMUSCULAR; INTRAVENOUS at 12:05

## 2024-08-28 RX ADMIN — METOCLOPRAMIDE 2.5 MG: 5 INJECTION, SOLUTION INTRAMUSCULAR; INTRAVENOUS at 18:51

## 2024-08-28 RX ADMIN — MORPHINE SULFATE 2 MG: 2 INJECTION, SOLUTION INTRAMUSCULAR; INTRAVENOUS at 00:07

## 2024-08-28 RX ADMIN — ESCITALOPRAM OXALATE 10 MG: 10 TABLET ORAL at 08:24

## 2024-08-28 RX ADMIN — SODIUM CHLORIDE, POTASSIUM CHLORIDE, SODIUM LACTATE AND CALCIUM CHLORIDE: 600; 310; 30; 20 INJECTION, SOLUTION INTRAVENOUS at 08:23

## 2024-08-28 ASSESSMENT — PAIN SCALES - GENERAL
PAINLEVEL_OUTOF10: 0
PAINLEVEL_OUTOF10: 7
PAINLEVEL_OUTOF10: 0
PAINLEVEL_OUTOF10: 9
PAINLEVEL_OUTOF10: 5

## 2024-08-28 ASSESSMENT — PAIN DESCRIPTION - LOCATION
LOCATION: ABDOMEN
LOCATION: ABDOMEN

## 2024-08-28 ASSESSMENT — PAIN DESCRIPTION - ORIENTATION
ORIENTATION: OTHER (COMMENT)
ORIENTATION: ANTERIOR

## 2024-08-28 ASSESSMENT — PAIN DESCRIPTION - DESCRIPTORS
DESCRIPTORS: SORE
DESCRIPTORS: ACHING;SHARP

## 2024-08-28 ASSESSMENT — PAIN SCALES - WONG BAKER: WONGBAKER_NUMERICALRESPONSE: NO HURT

## 2024-08-28 NOTE — CONSULTS
15 MG tablet Take one tab by mouth at dinner      mupirocin (BACTROBAN) 2 % ointment Apply a pea size amount on a cotton swab twice daily in each nostril for 5 days      omeprazole (PRILOSEC) 20 MG delayed release capsule Take 1 capsule by mouth every morning (before breakfast)      ondansetron (ZOFRAN-ODT) 8 MG TBDP disintegrating tablet ondansetron 8 mg disintegrating tablet      simvastatin (ZOCOR) 40 MG tablet Take 1 tablet by mouth daily      simvastatin (ZOCOR) 10 MG tablet simvastatin 10 mg tablet      simvastatin (ZOCOR) 20 MG tablet simvastatin 20 mg tablet      sucralfate (CARAFATE) 1 GM tablet Take 1 tablet by mouth daily      escitalopram (LEXAPRO) 20 MG tablet Take 1 tablet by mouth every morning      escitalopram (LEXAPRO) 10 MG tablet Take 1 tablet by mouth daily      escitalopram (LEXAPRO) 20 MG tablet       cholestyramine (QUESTRAN) 4 g packet Take 1 scoop twice a day by oral route.      Cholecalciferol 50 MCG (2000 UT) CAPS Take by mouth daily      denosumab (PROLIA) 60 MG/ML SOSY SC injection Inject 60 mg by subcutaneous route.      cetirizine (ZYRTEC) 10 MG tablet Take 1 tablet by mouth daily as needed      estradiol (ESTRACE) 0.1 MG/GM vaginal cream APPLY A PEA-SIZED AMOUNT TO THE INTROITUS EVERY NIGHT FOR 2 WEEKS THEN TWICE A WEEK      LORazepam (ATIVAN) 0.5 MG tablet TAKE 0.5 TABLET ORALLY AT BEDTIME AS NEEDED FOR SLEEP      simvastatin (ZOCOR) 10 MG tablet Take 1 tablet by mouth daily      SYNTHROID 25 MCG tablet       nirmatrelvir/ritonavir (PAXLOVID, 150/100,) 10 x 150 MG & 10 x 100MG TBPK Take 2 tablets by mouth in the morning and 2 tablets in the evening. Take 2 tablets (one 150 mg nirmatrelvir and one 100 mg ritonavir tablets) by mouth every 12 hours for 5 days..         .  Allergies   Allergen Reactions    Compazine [Prochlorperazine] Rash     Review of Systems - General ROS: negative  Psychological ROS: negative  Respiratory ROS: negative  Cardiovascular ROS: negative  Gastrointestinal  bowel. There are nondilated loops of small bowel within the right abdomen. Urinary bladder: Urinary bladder is partially filled and grossly normal. Miscellaneous: There is trace free fluid in the right lower quadrant. There is no free intraperitoneal gas. There is no focal fluid collection to suggest abscess.     Multiple dilated loops of small bowel. Trace free fluid in the right lower quadrant of the abdomen Electronically signed by Anant Luz MD    AP:  85 yo woman with partial small bowel obstruction    - Partial small bowel obstruction:  no acute indication for operation.  Will admit for IV fluid hydration and bowel rest  - NPO with ice chips and sips of clears  - AXR in am  - Labs in am  - Pain control  - Out of bed. Encourage ambulation    FACE TO FACE time including review of any indicated imaging, discussion with patient, and other providers, exam and discussion with patient: 30 minutes

## 2024-08-28 NOTE — PROGRESS NOTES
[facility]   Daily Progress Note    Admit Date: 2024  Post-Operative Day: * No surgery found * from * No surgery found *     Subjective:     Last 24 hrs: pt is passing some gas, no nausea at present       Objective:     Blood pressure 109/61, pulse 60, temperature 98.4 °F (36.9 °C), temperature source Oral, resp. rate 16, height 1.55 m (5' 1.02\"), weight 36 kg (79 lb 5.9 oz), SpO2 96%.  Temp (24hrs), Av.2 °F (36.8 °C), Min:97.7 °F (36.5 °C), Max:98.6 °F (37 °C)      _____________________  Physical Exam:     Alert and Oriented, x3, in no acute distress.  Cardiovascular: RRR, no peripheral edema  Abdomen: flat, some distant BS heard      Assessment:   Principal Problem:    Small bowel obstruction due to adhesions (HCC)  Resolved Problems:    * No resolved hospital problems. *          Plan:     Cont sips of clears for now  AXR to be done this am  IVF  Monitor Cr  OOB as tolerated    Data Review:    Recent Labs     24  0533   WBC 10.2 6.9   HGB 14.4 12.0   HCT 43.4 37.1    180     Recent Labs     24  0533   * 140   K 4.1 4.2   CL 98 106   CO2 31 30   BUN 22* 18   ALT 26  --      Invalid input(s): \"AML\", \"LPSE\"        ______________________  Medications:    Current Facility-Administered Medications   Medication Dose Route Frequency    lactated ringers IV soln infusion   IntraVENous Continuous    morphine (PF) injection 2 mg  2 mg IntraVENous Q3H PRN    ondansetron (ZOFRAN) injection 4 mg  4 mg IntraVENous Q4H PRN    acetaminophen (TYLENOL) tablet 650 mg  650 mg Oral Q6H PRN    aspirin EC tablet 81 mg  81 mg Oral Daily    diazePAM (VALIUM) tablet 5 mg  5 mg Oral Q6H PRN    pantoprazole (PROTONIX) 40 mg in sodium chloride (PF) 0.9 % 10 mL injection  40 mg IntraVENous Daily    polyethylene glycol (GLYCOLAX) packet 17 g  17 g Oral Daily    escitalopram (LEXAPRO) tablet 10 mg  10 mg Oral Daily    levothyroxine (SYNTHROID) tablet 25 mcg  25 mcg Oral  Daily       Ness Francois, APRN - NP  8/28/2024    ADDENDUM:  Humberto Josue MD  Pt was seen and examined.  Pt is more distended and bloated today.  No nausea or vomiting.  She is requiring pain medication.  No BM today.  Awaiting AXR to be completed.  Out of bed.  Encourage ambulation.  Labs in am.    FACE TO FACE time including review of any indicated imaging, discussion with patient, and other providers, exam and discussion with patient: 15 minutes

## 2024-08-28 NOTE — CARE COORDINATION
Care Management Initial Assessment       RUR: 14% Low   Readmission? No  1st IM letter given? Yes - 8/28.24  1st  letter given: NA    CM met with patient at bedside to introduce self and explain role. Patient lives alone in a 2 story home with 3 steps to enter and a full flight to enter the 2nd floor. Patient was independent at baseline with ADL's, IADL's and ambulation. No history of HH or SNF/IPR. Patient's son or daughter-in-law will provide transport home once medically stable. CM will follow as needed.        08/28/24 1312   Service Assessment   Patient Orientation Alert and Oriented;Person;Place;Situation;Self   Cognition Alert   History Provided By Patient   Primary Caregiver Self   Accompanied By/Relationship Son   Support Systems Children   Patient's Healthcare Decision Maker is: Legal Next of Kin   PCP Verified by CM Yes  (Carlie Norton MD)   Last Visit to PCP Within last 3 months   Prior Functional Level Independent in ADLs/IADLs   Current Functional Level Independent in ADLs/IADLs   Can patient return to prior living arrangement Yes   Ability to make needs known: Good   Family able to assist with home care needs: Yes   Would you like for me to discuss the discharge plan with any other family members/significant others, and if so, who? Yes  (Upon patient request)   Financial Resources Medicare   Social/Functional History   Lives With Alone   Type of Home House   Home Layout Two level   Bathroom Equipment None   Home Equipment None   ADL Assistance Independent   Homemaking Assistance Independent   Ambulation Assistance Independent   Transfer Assistance Independent   Discharge Planning   Type of Residence House   Living Arrangements Alone   Current Services Prior To Admission None   Potential Assistance Needed N/A   DME Ordered? No   Potential Assistance Purchasing Medications No   Patient expects to be discharged to: [facility]   [phone]

## 2024-08-28 NOTE — ED NOTES
ED TO INPATIENT SBAR HANDOFF    Patient Name: Emily Banks   :  1940  84 y.o.   MRN:  [id number]  ED Room #:  ER24/24     Situation  Code Status: Prior   Allergies: Compazine [prochlorperazine]  Weight: Patient Vitals for the past 96 hrs (Last 3 readings):   Weight   24 1848 36 kg (79 lb 5.9 oz)       Arrived from: home    Chief Complaint:   Chief Complaint   Patient presents with    Abdominal Pain    Constipation       Hospital Problem/Diagnosis:  Principal Problem:    Small bowel obstruction due to adhesions (HCC)  Resolved Problems:    * No resolved hospital problems. *      Mobility: no current mobility problem   ED Fall Risk: Presents to emergency department  because of falls (Syncope, seizure, or loss of consciousness): No, Age > 70: Yes, Altered Mental Status, Intoxication with alcohol or substance confusion (Disorientation, impaired judgment, poor safety awaremess, or inability to follow instructions): No, Impaired Mobility: Ambulates or transfers with assistive devices or assistance; Unable to ambulate or transer.: No, Nursing Judgement: Yes   Fell in ED or prior to admission: no   Restraints: no     Sitter: no   Family/Caregiver Present: no    Neet to know social/safety information: stand by assist    Background  History: History reviewed. No pertinent past medical history.    Assessment    Abnormal Assessment Findings: Abdominal pain, pt denies nausea returning since last zofran dose   Imaging:   CT ABDOMEN PELVIS W IV CONTRAST Additional Contrast? None   Final Result   Multiple dilated loops of small bowel. Trace free fluid in the right   lower quadrant of the abdomen       Electronically signed by Anant Luz MD      XR ABDOMEN (2 VIEWS)    (Results Pending)     Abnormal labs:   Abnormal Labs Reviewed   COMPREHENSIVE METABOLIC PANEL - Abnormal; Notable for the following components:       Result Value    Sodium 135 (*)     Glucose 115 (*)     BUN 22 (*)     Creatinine 1.42 (*)     Est,  08/27/24 1918   Phlebitis Assessment No symptoms 08/27/24 1918   Infiltration Assessment 0 08/27/24 1918   Alcohol Cap Used No 08/27/24 1918   Dressing Status Clean, dry & intact 08/27/24 1918   Dressing Type Transparent 08/27/24 1918   Dressing Intervention New 08/27/24 1918     Pertinent or High Risk Medications/Drips: no   If Yes, please provide details: n/a  Titratable drips: no   Pending Blood Product Administration: no     Sepsis: Sepsis Risk Score   Predictive Model Details          19 (Low)  Factor Value    Calculated 8/28/2024 00:04 16% O2 Delivery Method ROOM AIR    [facility] EARLY DETECTION OF SEPSIS VERSION 2 Model 10% Age 84 years old     10% Total Active Inpatient Meds 10     6% Creatinine 1.42 MG/DL     4% Antiemetic Admins Last 24 Hours 1     4% Has Imaging Procedure present     -4% Total Active Outpatient Meds 32     -3% Absolute Lymphocytes 1.7 K/UL     -3% Pulse 63     -2% RBC Count 4.71 M/uL      Recent Labs     08/27/24 1913   WBC 10.2     Blood Cultures Drawn: No   Repeat LA: Time Due n/a  Antibiotic Given: No  Fluid Resuscitation:  , Status other LR @ 125    VS x 2 post-fluid resuscitation: N/A    Recommendation    Plan for next 24 hours: Bowel rest/ IV fluids/Pain control  Additional Comments:      Pending orders U/a   Consults ordered: IP CONSULT TO GENERAL SURGERY    Consulted provider:      If any further questions, please call Sending RN at [phone]  Electronically signed by: Electronically signed by Awilda Donnelly RN on 8/28/2024 at 12:17 AM

## 2024-08-28 NOTE — H&P
Chief Complaint:  Partial small bowel obstruction    HPI:  83 yo woman with hx ulcerative colitis s/p total colectomy with colonic J-pouch performed in [place] 30 years ago presented to ER with abdominal pain and nausea.  Pt reported symptoms developed today.  Pain has been in lower abdomen.  She reported nausea but no vomiting.  Symptoms have improved since being in ER with IV fluid, morphine and zofran.  CT scan showed multiple dilated loops of bowel without transition point.  No fever or chills.      PMH:  Hypothyroidism, ulcerative colitis, high cholesterol, GERD    Past Surgical History:   Procedure Laterality Date    COLECTOMY      JOINT REPLACEMENT      2019     History reviewed. No pertinent family history.    No current facility-administered medications on file prior to encounter.     Current Outpatient Medications on File Prior to Encounter   Medication Sig Dispense Refill    alendronate (FOSAMAX) 70 MG tablet Take 1 tablet by mouth once a week      amoxicillin (AMOXIL) 500 MG capsule 4 capsules by mouth 1 hour prior to dental work      Ascorbic Acid  MG CPCR Take 2 tablets PO BID      aspirin 81 MG EC tablet Take 1 tablet by mouth daily      cholestyramine (QUESTRAN) 4 GM/DOSE powder       clobetasol (TEMOVATE) 0.05 % ointment Apply to affected perirectal area nightly (small amount) as needed for irritation (2-3 days/week)      diazePAM (VALIUM) 5 MG tablet diazepam 5 mg tablet   TAKE 1-2 TABLET BY MOUTH EVERY 6-8 HOURS AS NEEDED FOR SEDATION. TO BE USED BY PHYSRAMONA IN OFFICE      doxycycline hyclate (VIBRAMYCIN) 100 MG capsule doxycycline hyclate 100 mg capsule      ergocalciferol (ERGOCALCIFEROL) 1.25 MG (50527 UT) capsule Take 3,000 Units by mouth      Estradiol (VAGIFEM) 10 MCG TABS vaginal tablet Place 1 tablet vaginally Twice a Week      gabapentin (NEURONTIN) 300 MG capsule Take one tablet po at bedtime      levothyroxine (SYNTHROID) 25 MCG tablet Synthroid 25 mcg tablet      meloxicam (MOBIC)

## 2024-08-29 ENCOUNTER — APPOINTMENT (OUTPATIENT)
Facility: HOSPITAL | Age: 84
End: 2024-08-29
Payer: MEDICARE

## 2024-08-29 PROCEDURE — 6360000002 HC RX W HCPCS: Performed by: SURGERY

## 2024-08-29 PROCEDURE — 6370000000 HC RX 637 (ALT 250 FOR IP): Performed by: SURGERY

## 2024-08-29 PROCEDURE — 74019 RADEX ABDOMEN 2 VIEWS: CPT

## 2024-08-29 PROCEDURE — 2580000003 HC RX 258: Performed by: SURGERY

## 2024-08-29 PROCEDURE — 74018 RADEX ABDOMEN 1 VIEW: CPT

## 2024-08-29 PROCEDURE — 1100000000 HC RM PRIVATE

## 2024-08-29 PROCEDURE — APPSS15 APP SPLIT SHARED TIME 0-15 MINUTES

## 2024-08-29 RX ORDER — LORAZEPAM 2 MG/ML
0.5 INJECTION INTRAMUSCULAR EVERY 6 HOURS PRN
Status: DISCONTINUED | OUTPATIENT
Start: 2024-08-29 | End: 2024-08-30 | Stop reason: HOSPADM

## 2024-08-29 RX ORDER — ESCITALOPRAM OXALATE 10 MG/1
20 TABLET ORAL DAILY
Status: DISCONTINUED | OUTPATIENT
Start: 2024-08-29 | End: 2024-08-30 | Stop reason: HOSPADM

## 2024-08-29 RX ADMIN — METOCLOPRAMIDE 2.5 MG: 5 INJECTION, SOLUTION INTRAMUSCULAR; INTRAVENOUS at 11:13

## 2024-08-29 RX ADMIN — SODIUM CHLORIDE, PRESERVATIVE FREE 40 MG: 5 INJECTION INTRAVENOUS at 08:40

## 2024-08-29 RX ADMIN — METOCLOPRAMIDE 2.5 MG: 5 INJECTION, SOLUTION INTRAMUSCULAR; INTRAVENOUS at 01:17

## 2024-08-29 RX ADMIN — SODIUM CHLORIDE, POTASSIUM CHLORIDE, SODIUM LACTATE AND CALCIUM CHLORIDE: 600; 310; 30; 20 INJECTION, SOLUTION INTRAVENOUS at 08:42

## 2024-08-29 RX ADMIN — ESCITALOPRAM OXALATE 20 MG: 10 TABLET ORAL at 13:56

## 2024-08-29 RX ADMIN — LORAZEPAM 0.25 MG: 2 INJECTION INTRAMUSCULAR; INTRAVENOUS at 21:32

## 2024-08-29 RX ADMIN — SODIUM CHLORIDE, POTASSIUM CHLORIDE, SODIUM LACTATE AND CALCIUM CHLORIDE: 600; 310; 30; 20 INJECTION, SOLUTION INTRAVENOUS at 01:20

## 2024-08-29 RX ADMIN — METOCLOPRAMIDE 2.5 MG: 5 INJECTION, SOLUTION INTRAMUSCULAR; INTRAVENOUS at 06:29

## 2024-08-29 RX ADMIN — LEVOTHYROXINE SODIUM 25 MCG: 0.03 TABLET ORAL at 13:56

## 2024-08-29 NOTE — PROGRESS NOTES
General Surgery Daily Progress Note    Admit Date: 2024  Post-Operative Day: * No surgery found * from * No surgery found *     Subjective:       Last 24 hrs: no acute events overnight.  Pt states she is feeling a little better then yesterday, but still feels very bloated and having waves of nausea.   Passing small flatus, but no BM yet. Cr trending back down.     AXR yesterday showed ongoing small bowel obstruction.     Objective:     Blood pressure (!) 110/55, pulse 72, temperature 98.2 °F (36.8 °C), temperature source Oral, resp. rate 16, height 1.55 m (5' 1.02\"), weight 36 kg (79 lb 5.9 oz), SpO2 98%.  Temp (24hrs), Av.3 °F (36.8 °C), Min:98.2 °F (36.8 °C), Max:98.4 °F (36.9 °C)      _____________________  Physical Exam:     Alert and Oriented x 3, in no acute distress.  Cardiovascular: RRR, S1S2  Lungs:CTAB; unlabored  Abdomen: soft, mild distention, mild generalized TTP, no guarding or rebound. +BS     Data Review:    Recent Labs     24  0533   WBC 10.2 6.9   HGB 14.4 12.0   HCT 43.4 37.1    180     Recent Labs     24  1913 08/28/24  0533   * 140   K 4.1 4.2   CL 98 106   CO2 31 30   BUN 22* 18   ALT 26  --      Invalid input(s): \"AML\", \"LPSE\"        ______________________  Medications:    Current Facility-Administered Medications   Medication Dose Route Frequency    metoclopramide (REGLAN) injection 2.5 mg  2.5 mg IntraVENous Q6H    lactated ringers IV soln infusion   IntraVENous Continuous    morphine (PF) injection 2 mg  2 mg IntraVENous Q3H PRN    ondansetron (ZOFRAN) injection 4 mg  4 mg IntraVENous Q4H PRN    acetaminophen (TYLENOL) tablet 650 mg  650 mg Oral Q6H PRN    aspirin EC tablet 81 mg  81 mg Oral Daily    diazePAM (VALIUM) tablet 5 mg  5 mg Oral Q6H PRN    pantoprazole (PROTONIX) 40 mg in sodium chloride (PF) 0.9 % 10 mL injection  40 mg IntraVENous Daily    polyethylene glycol (GLYCOLAX) packet 17 g  17 g Oral Daily    escitalopram (LEXAPRO)

## 2024-08-30 VITALS
HEIGHT: 61 IN | SYSTOLIC BLOOD PRESSURE: 104 MMHG | HEART RATE: 60 BPM | WEIGHT: 79.37 LBS | TEMPERATURE: 97.9 F | DIASTOLIC BLOOD PRESSURE: 54 MMHG | RESPIRATION RATE: 14 BRPM | BODY MASS INDEX: 14.98 KG/M2 | OXYGEN SATURATION: 97 %

## 2024-08-30 PROCEDURE — 6360000002 HC RX W HCPCS: Performed by: SURGERY

## 2024-08-30 PROCEDURE — 2580000003 HC RX 258: Performed by: SURGERY

## 2024-08-30 PROCEDURE — 6370000000 HC RX 637 (ALT 250 FOR IP): Performed by: SURGERY

## 2024-08-30 RX ADMIN — POLYETHYLENE GLYCOL 3350 17 G: 17 POWDER, FOR SOLUTION ORAL at 10:45

## 2024-08-30 RX ADMIN — METOCLOPRAMIDE 2.5 MG: 5 INJECTION, SOLUTION INTRAMUSCULAR; INTRAVENOUS at 00:46

## 2024-08-30 RX ADMIN — SODIUM CHLORIDE, PRESERVATIVE FREE 40 MG: 5 INJECTION INTRAVENOUS at 10:44

## 2024-08-30 RX ADMIN — LEVOTHYROXINE SODIUM 25 MCG: 0.03 TABLET ORAL at 05:54

## 2024-08-30 RX ADMIN — ASPIRIN 81 MG: 81 TABLET, COATED ORAL at 10:43

## 2024-08-30 RX ADMIN — ESCITALOPRAM OXALATE 20 MG: 10 TABLET ORAL at 10:44

## 2024-08-30 NOTE — DISCHARGE SUMMARY
Physician Discharge Summary     Patient ID:  Emily Banks  [id number]  84 y.o.  1940    Admit Date: 8/27/2024    Discharge Date: 8/30/2024    Admission Diagnoses: SBO (small bowel obstruction) ([facility]) [K56.609]  HUSSAIN (acute kidney injury) ([facility]) [N17.9]  Small bowel obstruction due to adhesions ([facility]) [K56.50]    Discharge Diagnoses:  Principal Problem:    Small bowel obstruction due to adhesions (HCC)  Resolved Problems:    * No resolved hospital problems. *       Admission Condition: Serious    Discharge Condition: Good    Last Procedure: * No surgery found *      Hospital Course:   Pt with hx ulcerative colitis s/p total colectomy with colonic J-pouch performed in [place] 30 years ago presented to ER with abdominal pain and nausea.  CT abd/pelvis showed Multiple dilated loops of small bowel.  Pt admitted to hospital with partial small bowel obstruction was managed conservatively.  SBO resolved without surgical intervention.  Pt + flatus and multiple bowel movements. Tolerated regular low fiber diet without any nausea or vomiting. Denied any abdominal pain.  Pt discharged home with follow up as needed.     Consults: None    Disposition: home    Patient Instructions:   Current Discharge Medication List        CONTINUE these medications which have NOT CHANGED    Details   ergocalciferol (ERGOCALCIFEROL) 1.25 MG (51861 UT) capsule Take 3,000 Units by mouth      !! levothyroxine (SYNTHROID) 25 MCG tablet Synthroid 25 mcg tablet      !! simvastatin (ZOCOR) 10 MG tablet simvastatin 10 mg tablet      !! escitalopram (LEXAPRO) 20 MG tablet Take 1 tablet by mouth every morning      Cholecalciferol 50 MCG (2000 UT) CAPS Take by mouth daily      denosumab (PROLIA) 60 MG/ML SOSY SC injection Inject 60 mg by subcutaneous route.      LORazepam (ATIVAN) 0.5 MG tablet TAKE 0.5 TABLET ORALLY AT BEDTIME AS NEEDED FOR SLEEP      alendronate (FOSAMAX) 70 MG tablet Take 1 tablet by mouth once a week      amoxicillin (AMOXIL) 500 MG  capsule 4 capsules by mouth 1 hour prior to dental work      Ascorbic Acid  MG CPCR Take 2 tablets PO BID      aspirin 81 MG EC tablet Take 1 tablet by mouth daily      cholestyramine (QUESTRAN) 4 GM/DOSE powder       clobetasol (TEMOVATE) 0.05 % ointment Apply to affected perirectal area nightly (small amount) as needed for irritation (2-3 days/week)      diazePAM (VALIUM) 5 MG tablet diazepam 5 mg tablet   TAKE 1-2 TABLET BY MOUTH EVERY 6-8 HOURS AS NEEDED FOR SEDATION. TO BE USED BY PHYSCIAN IN OFFICE      doxycycline hyclate (VIBRAMYCIN) 100 MG capsule doxycycline hyclate 100 mg capsule      Estradiol (VAGIFEM) 10 MCG TABS vaginal tablet Place 1 tablet vaginally Twice a Week      gabapentin (NEURONTIN) 300 MG capsule Take one tablet po at bedtime      meloxicam (MOBIC) 15 MG tablet Take one tab by mouth at dinner      mupirocin (BACTROBAN) 2 % ointment Apply a pea size amount on a cotton swab twice daily in each nostril for 5 days      omeprazole (PRILOSEC) 20 MG delayed release capsule Take 1 capsule by mouth every morning (before breakfast)      ondansetron (ZOFRAN-ODT) 8 MG TBDP disintegrating tablet ondansetron 8 mg disintegrating tablet      !! simvastatin (ZOCOR) 40 MG tablet Take 1 tablet by mouth daily      !! simvastatin (ZOCOR) 20 MG tablet simvastatin 20 mg tablet      sucralfate (CARAFATE) 1 GM tablet Take 1 tablet by mouth daily      !! escitalopram (LEXAPRO) 10 MG tablet Take 1 tablet by mouth daily      !! escitalopram (LEXAPRO) 20 MG tablet       cholestyramine (QUESTRAN) 4 g packet Take 1 scoop twice a day by oral route.      cetirizine (ZYRTEC) 10 MG tablet Take 1 tablet by mouth daily as needed      estradiol (ESTRACE) 0.1 MG/GM vaginal cream APPLY A PEA-SIZED AMOUNT TO THE INTROITUS EVERY NIGHT FOR 2 WEEKS THEN TWICE A WEEK      !! simvastatin (ZOCOR) 10 MG tablet Take 1 tablet by mouth daily      !! SYNTHROID 25 MCG tablet       nirmatrelvir/ritonavir (PAXLOVID, 150/100,) 10 x 150 MG &

## 2024-08-30 NOTE — PROGRESS NOTES
General Surgery Progress Note    Small bowel obstruction  Date:2024       Room:[facility]  Patient Name:Emily Banks     YOB: 1940     Age:84 y.o.    Subjective     No acute surgical issues.  Pt is doing well.  She had upper GI study yesterday and is now having multiple BM's.  No nausea or vomiting.  Pain is well control.     Medications   Scheduled Meds:    escitalopram  20 mg Oral Daily    metoclopramide  2.5 mg IntraVENous Q6H    aspirin  81 mg Oral Daily    pantoprazole (PROTONIX) 40 mg in sodium chloride (PF) 0.9 % 10 mL injection  40 mg IntraVENous Daily    polyethylene glycol  17 g Oral Daily    levothyroxine  25 mcg Oral Daily     Continuous Infusions:    lactated ringers IV soln 125 mL/hr at 24 0842     PRN Meds: diatrizoate meglumine-sodium, LORazepam, morphine, ondansetron, acetaminophen, diazePAM        Physical Examination      Vitals:  BP (!) 104/54   Pulse 60   Temp 97.9 °F (36.6 °C) (Oral)   Resp 14   Ht 1.55 m (5' 1.02\")   Wt 36 kg (79 lb 5.9 oz)   SpO2 97%   BMI 14.98 kg/m²   Temp (24hrs), Av.8 °F (36.6 °C), Min:97.7 °F (36.5 °C), Max:97.9 °F (36.6 °C)      Physical Exam:    Gen:  No apparent distress  Neuro:  Alert and oriented x4  Pulm:  Unlabored  Abd:  Soft/non-distended/mild tenderness to palpation without guarding or rebound  Ext:  No cyanosis, clubbing or edema    I/O (24Hr):  No intake or output data in the 24 hours ending 24 0816      Labs/Imaging/Diagnostics   Labs:  CBC:  Recent Labs     24  0533   WBC 10.2 6.9   RBC 4.71 3.92   HGB 14.4 12.0   HCT 43.4 37.1   MCV 92.1 94.6   RDW 12.7 13.0    180     CHEMISTRIES:  Recent Labs     24  0533   * 140   K 4.1 4.2   CL 98 106   CO2 31 30   BUN 22* 18   CREATININE 1.42* 1.26*   GLUCOSE 115* 96     PT/INR:No results for input(s): \"PROTIME\", \"INR\" in the last 72 hours.  APTT:No results for input(s): \"APTT\" in the last 72 hours.  LIVER PROFILE:  Recent

## 2024-08-30 NOTE — PROGRESS NOTES
Pt states she is tolerating regular diet without n/v.  Denies any abdominal pain.  Continues to pass flatus and just had another BM.  Pt requesting to discharge home today.  Discharge orders placed. D/W Dr. Josue

## 2024-08-30 NOTE — DISCHARGE INSTRUCTIONS
[facility] General [facility] at [facility]   Post Operative Instructions      Please call your surgeons  office for appt as needed   Office address is:    [facility]  At [place] [address]  [phone]      Discharge Instructions:    You may resume you usual diet as well as your usual medications.  Attached recommendation for foods to avoid.       Activity as tolerated     Call our office at  [phone]  with any problems, questions or concerns.  Call our office if you have any     Fevers of 101 or higher   Worsening pain  Nausea/Vomiting  Difficulty eating or drinking

## 2024-09-04 ENCOUNTER — HOSPITAL ENCOUNTER (OUTPATIENT)
Facility: HOSPITAL | Age: 84
Setting detail: INFUSION SERIES
Discharge: HOME | End: 2024-09-04
Payer: MEDICARE

## 2024-09-04 VITALS — TEMPERATURE: 97.8 F | SYSTOLIC BLOOD PRESSURE: 123 MMHG | DIASTOLIC BLOOD PRESSURE: 58 MMHG | HEART RATE: 52 BPM

## 2024-09-04 DIAGNOSIS — N18.32 STAGE 3B CHRONIC KIDNEY DISEASE (HCC): ICD-10-CM

## 2024-09-04 DIAGNOSIS — E86.0 DEHYDRATION: Primary | ICD-10-CM

## 2024-09-04 PROCEDURE — 96361 HYDRATE IV INFUSION ADD-ON: CPT

## 2024-09-04 PROCEDURE — 96360 HYDRATION IV INFUSION INIT: CPT

## 2024-09-04 PROCEDURE — 2580000003 HC RX 258: Performed by: FAMILY MEDICINE

## 2024-09-04 RX ORDER — DEXTROSE MONOHYDRATE AND SODIUM CHLORIDE 5; .45 G/100ML; G/100ML
INJECTION, SOLUTION INTRAVENOUS ONCE
Status: COMPLETED | OUTPATIENT
Start: 2024-09-04 | End: 2024-09-04

## 2024-09-04 RX ORDER — DEXTROSE MONOHYDRATE AND SODIUM CHLORIDE 5; .45 G/100ML; G/100ML
INJECTION, SOLUTION INTRAVENOUS ONCE
Start: 2024-09-18 | End: 2024-09-18

## 2024-09-04 RX ADMIN — DEXTROSE AND SODIUM CHLORIDE: 5; 450 INJECTION, SOLUTION INTRAVENOUS at 11:37

## 2024-09-04 NOTE — PROGRESS NOTES
[facility] Short Note                       Date: 2024    Name: Emily Banks    MRN: [id number]         : 1940    1130 Pt admit to [facility] for Hydration ambulatory in stable condition. Assessment completed. No new concerns voiced.    Ms. Banks's vitals were reviewed prior to treatment.   Patient Vitals for the past 12 hrs:   Temp Pulse BP   24 1130 97.8 °F (36.6 °C) 52 (!) 123/58       PIV(left fa) with positive blood return. flushed,  and de-accessed per protocol.       Medications given:   Medications Administered         dextrose 5 % and 0.45 % sodium chloride infusion Admin Date  2024 Action  New Bag Dose   Rate  250 mL/hr Route  IntraVENous Documented By  Alea Whiteside, RN               1527 Pt tolerated treatment well. D/c home ambulatory in no distress. Pt aware of next appointment scheduled for 24.    Future Appointments   Date Time Provider Department Center   2024 11:30 AM [facility]   10/2/2024 11:30 AM [facility]       ALEA WHITESIDE RN  2024  3:32 PM

## 2024-09-18 ENCOUNTER — HOSPITAL ENCOUNTER (OUTPATIENT)
Facility: HOSPITAL | Age: 84
Setting detail: INFUSION SERIES
Discharge: HOME | End: 2024-09-18
Payer: MEDICARE

## 2024-09-18 VITALS
TEMPERATURE: 98.6 F | SYSTOLIC BLOOD PRESSURE: 103 MMHG | HEART RATE: 55 BPM | RESPIRATION RATE: 16 BRPM | DIASTOLIC BLOOD PRESSURE: 52 MMHG

## 2024-09-18 DIAGNOSIS — N18.32 STAGE 3B CHRONIC KIDNEY DISEASE (HCC): ICD-10-CM

## 2024-09-18 DIAGNOSIS — E86.0 DEHYDRATION: Primary | ICD-10-CM

## 2024-09-18 PROCEDURE — 96361 HYDRATE IV INFUSION ADD-ON: CPT

## 2024-09-18 PROCEDURE — 2580000003 HC RX 258: Performed by: FAMILY MEDICINE

## 2024-09-18 PROCEDURE — 96360 HYDRATION IV INFUSION INIT: CPT

## 2024-09-18 RX ORDER — DEXTROSE MONOHYDRATE AND SODIUM CHLORIDE 5; .45 G/100ML; G/100ML
INJECTION, SOLUTION INTRAVENOUS ONCE
Start: 2024-10-02 | End: 2024-10-02

## 2024-09-18 RX ORDER — DEXTROSE MONOHYDRATE AND SODIUM CHLORIDE 5; .45 G/100ML; G/100ML
INJECTION, SOLUTION INTRAVENOUS ONCE
Status: COMPLETED | OUTPATIENT
Start: 2024-09-18 | End: 2024-09-18

## 2024-09-18 RX ADMIN — DEXTROSE AND SODIUM CHLORIDE: 5; 450 INJECTION, SOLUTION INTRAVENOUS at 12:09

## 2024-10-02 ENCOUNTER — HOSPITAL ENCOUNTER (OUTPATIENT)
Facility: HOSPITAL | Age: 84
Setting detail: INFUSION SERIES
Discharge: HOME | End: 2024-10-02
Payer: MEDICARE

## 2024-10-02 VITALS
HEART RATE: 60 BPM | RESPIRATION RATE: 18 BRPM | SYSTOLIC BLOOD PRESSURE: 97 MMHG | TEMPERATURE: 97.7 F | OXYGEN SATURATION: 100 % | DIASTOLIC BLOOD PRESSURE: 54 MMHG

## 2024-10-02 DIAGNOSIS — N18.32 STAGE 3B CHRONIC KIDNEY DISEASE (HCC): ICD-10-CM

## 2024-10-02 DIAGNOSIS — E86.0 DEHYDRATION: Primary | ICD-10-CM

## 2024-10-02 PROCEDURE — 96360 HYDRATION IV INFUSION INIT: CPT

## 2024-10-02 PROCEDURE — 96361 HYDRATE IV INFUSION ADD-ON: CPT

## 2024-10-02 PROCEDURE — 2580000003 HC RX 258: Performed by: FAMILY MEDICINE

## 2024-10-02 RX ORDER — DEXTROSE MONOHYDRATE AND SODIUM CHLORIDE 5; .45 G/100ML; G/100ML
INJECTION, SOLUTION INTRAVENOUS ONCE
Start: 2024-10-16 | End: 2024-10-16

## 2024-10-02 RX ORDER — DEXTROSE MONOHYDRATE AND SODIUM CHLORIDE 5; .45 G/100ML; G/100ML
INJECTION, SOLUTION INTRAVENOUS ONCE
Status: COMPLETED | OUTPATIENT
Start: 2024-10-02 | End: 2024-10-02

## 2024-10-02 RX ADMIN — DEXTROSE AND SODIUM CHLORIDE: 5; 450 INJECTION, SOLUTION INTRAVENOUS at 11:28

## 2024-10-02 ASSESSMENT — PAIN SCALES - GENERAL: PAINLEVEL_OUTOF10: 0

## 2024-10-02 NOTE — PROGRESS NOTES
[facility] Short Note                   Date: 2024    Name: Emily Banks    MRN: [id number]         : 1940      Pt admit to [facility] for HYDRATION ambulatory in stable condition. Assessment completed and documented in flowsheets. No acute concerns at this time. No labs ordered/due.    Ms. Banks's vitals were reviewed  Patient Vitals for the past 12 hrs:   Temp Pulse Resp BP SpO2   10/02/24 1118 97.7 °F (36.5 °C) 60 18 (!) 97/54 100 %     Medications given: via PIV in L forearm  Medications Administered         dextrose 5 % and 0.45 % sodium chloride infusion Admin Date  10/02/2024 Action  New Bag Dose   Rate  250 mL/hr Route  IntraVENous Documented By  Arianna San RN          PIV placed with positive blood return. PIV was flushed and removed per protocol prior to discharge.    Ms. Banks tolerated the infusion and had no complaints.    [facility] and is aware of future appointments.     Future Appointments   Date Time Provider Department Center   10/16/2024  9:00 AM PEDS FASTTRACK 2 [facility]   10/30/2024 10:00 AM [facility]S FASTTRACK 1 [facility]   2024 10:00 AM [facility]S FASTTRACK 1 [facility]   [facility] 10[facility]   2024 10:00 AM [facility]S FASTTRACK 1 [facility]   2024  9:00 AM PEDS FASTTRACK 2 [facility]   2025 10:00 [facility]       Arianna San RN  2024  12:34 PM

## 2024-10-16 ENCOUNTER — HOSPITAL ENCOUNTER (OUTPATIENT)
Facility: HOSPITAL | Age: 84
Setting detail: INFUSION SERIES
End: 2024-10-16

## 2024-11-13 ENCOUNTER — HOSPITAL ENCOUNTER (OUTPATIENT)
Facility: HOSPITAL | Age: 84
Setting detail: INFUSION SERIES
End: 2024-11-13

## 2024-11-22 ENCOUNTER — APPOINTMENT (OUTPATIENT)
Facility: HOSPITAL | Age: 84
DRG: 389 | End: 2024-11-22
Payer: MEDICARE

## 2024-11-22 ENCOUNTER — HOSPITAL ENCOUNTER (INPATIENT)
Facility: HOSPITAL | Age: 84
LOS: 5 days | Discharge: HOME HEALTH | DRG: 389 | End: 2024-11-27
Attending: EMERGENCY MEDICINE | Admitting: STUDENT IN AN ORGANIZED HEALTH CARE EDUCATION/TRAINING PROGRAM
Payer: MEDICARE

## 2024-11-22 DIAGNOSIS — E86.0 DEHYDRATION: ICD-10-CM

## 2024-11-22 DIAGNOSIS — R10.84 GENERALIZED ABDOMINAL PAIN: Primary | ICD-10-CM

## 2024-11-22 PROBLEM — E86.9 VOLUME DEPLETION: Status: ACTIVE | Noted: 2024-11-22

## 2024-11-22 LAB
ALBUMIN SERPL-MCNC: 3.8 G/DL (ref 3.5–5)
ALBUMIN/GLOB SERPL: 1 (ref 1.1–2.2)
ALP SERPL-CCNC: 42 U/L (ref 45–117)
ALT SERPL-CCNC: 28 U/L (ref 12–78)
ANION GAP SERPL CALC-SCNC: 7 MMOL/L (ref 2–12)
AST SERPL-CCNC: 26 U/L (ref 15–37)
BASOPHILS # BLD: 0 K/UL (ref 0–0.1)
BASOPHILS NFR BLD: 1 % (ref 0–1)
BILIRUB SERPL-MCNC: 0.6 MG/DL (ref 0.2–1)
BUN SERPL-MCNC: 18 MG/DL (ref 6–20)
BUN/CREAT SERPL: 14 (ref 12–20)
CALCIUM SERPL-MCNC: 11 MG/DL (ref 8.5–10.1)
CHLORIDE SERPL-SCNC: 96 MMOL/L (ref 97–108)
CO2 SERPL-SCNC: 33 MMOL/L (ref 21–32)
COMMENT:: NORMAL
CREAT SERPL-MCNC: 1.28 MG/DL (ref 0.55–1.02)
DIFFERENTIAL METHOD BLD: NORMAL
EOSINOPHIL # BLD: 0.1 K/UL (ref 0–0.4)
EOSINOPHIL NFR BLD: 1 % (ref 0–7)
ERYTHROCYTE [DISTWIDTH] IN BLOOD BY AUTOMATED COUNT: 13.2 % (ref 11.5–14.5)
GLOBULIN SER CALC-MCNC: 4 G/DL (ref 2–4)
GLUCOSE SERPL-MCNC: 122 MG/DL (ref 65–100)
HCT VFR BLD AUTO: 41.3 % (ref 35–47)
HGB BLD-MCNC: 14 G/DL (ref 11.5–16)
IMM GRANULOCYTES # BLD AUTO: 0 K/UL (ref 0–0.04)
IMM GRANULOCYTES NFR BLD AUTO: 0 % (ref 0–0.5)
LIPASE SERPL-CCNC: 54 U/L (ref 13–75)
LYMPHOCYTES # BLD: 1.5 K/UL (ref 0.8–3.5)
LYMPHOCYTES NFR BLD: 19 % (ref 12–49)
MAGNESIUM SERPL-MCNC: 2 MG/DL (ref 1.6–2.4)
MCH RBC QN AUTO: 30.8 PG (ref 26–34)
MCHC RBC AUTO-ENTMCNC: 33.9 G/DL (ref 30–36.5)
MCV RBC AUTO: 91 FL (ref 80–99)
MONOCYTES # BLD: 0.6 K/UL (ref 0–1)
MONOCYTES NFR BLD: 8 % (ref 5–13)
NEUTS SEG # BLD: 5.6 K/UL (ref 1.8–8)
NEUTS SEG NFR BLD: 71 % (ref 32–75)
NRBC # BLD: 0 K/UL (ref 0–0.01)
NRBC BLD-RTO: 0 PER 100 WBC
PHOSPHATE SERPL-MCNC: 4.2 MG/DL (ref 2.6–4.7)
PLATELET # BLD AUTO: 188 K/UL (ref 150–400)
PMV BLD AUTO: 12.7 FL (ref 8.9–12.9)
POTASSIUM SERPL-SCNC: 4 MMOL/L (ref 3.5–5.1)
PROT SERPL-MCNC: 7.8 G/DL (ref 6.4–8.2)
RBC # BLD AUTO: 4.54 M/UL (ref 3.8–5.2)
SODIUM SERPL-SCNC: 136 MMOL/L (ref 136–145)
SPECIMEN HOLD: NORMAL
WBC # BLD AUTO: 7.8 K/UL (ref 3.6–11)

## 2024-11-22 PROCEDURE — 80053 COMPREHEN METABOLIC PANEL: CPT

## 2024-11-22 PROCEDURE — 99285 EMERGENCY DEPT VISIT HI MDM: CPT

## 2024-11-22 PROCEDURE — 6360000004 HC RX CONTRAST MEDICATION: Performed by: EMERGENCY MEDICINE

## 2024-11-22 PROCEDURE — 2580000003 HC RX 258: Performed by: EMERGENCY MEDICINE

## 2024-11-22 PROCEDURE — 99221 1ST HOSP IP/OBS SF/LOW 40: CPT | Performed by: SURGERY

## 2024-11-22 PROCEDURE — 6360000002 HC RX W HCPCS: Performed by: EMERGENCY MEDICINE

## 2024-11-22 PROCEDURE — 84100 ASSAY OF PHOSPHORUS: CPT

## 2024-11-22 PROCEDURE — 85025 COMPLETE CBC W/AUTO DIFF WBC: CPT

## 2024-11-22 PROCEDURE — 83690 ASSAY OF LIPASE: CPT

## 2024-11-22 PROCEDURE — 74177 CT ABD & PELVIS W/CONTRAST: CPT

## 2024-11-22 PROCEDURE — 83735 ASSAY OF MAGNESIUM: CPT

## 2024-11-22 PROCEDURE — 2580000003 HC RX 258: Performed by: STUDENT IN AN ORGANIZED HEALTH CARE EDUCATION/TRAINING PROGRAM

## 2024-11-22 PROCEDURE — 1100000000 HC RM PRIVATE

## 2024-11-22 PROCEDURE — 36415 COLL VENOUS BLD VENIPUNCTURE: CPT

## 2024-11-22 RX ORDER — MORPHINE SULFATE 2 MG/ML
1 INJECTION, SOLUTION INTRAMUSCULAR; INTRAVENOUS
Status: DISCONTINUED | OUTPATIENT
Start: 2024-11-22 | End: 2024-11-27 | Stop reason: HOSPADM

## 2024-11-22 RX ORDER — NALOXONE HYDROCHLORIDE 0.4 MG/ML
0.4 INJECTION, SOLUTION INTRAMUSCULAR; INTRAVENOUS; SUBCUTANEOUS PRN
Status: DISCONTINUED | OUTPATIENT
Start: 2024-11-22 | End: 2024-11-27 | Stop reason: HOSPADM

## 2024-11-22 RX ORDER — ACETAMINOPHEN 325 MG/1
650 TABLET ORAL EVERY 6 HOURS PRN
Status: DISCONTINUED | OUTPATIENT
Start: 2024-11-22 | End: 2024-11-27 | Stop reason: HOSPADM

## 2024-11-22 RX ORDER — ONDANSETRON 4 MG/1
4 TABLET, ORALLY DISINTEGRATING ORAL EVERY 8 HOURS PRN
Status: DISCONTINUED | OUTPATIENT
Start: 2024-11-22 | End: 2024-11-23

## 2024-11-22 RX ORDER — POLYETHYLENE GLYCOL 3350 17 G/17G
17 POWDER, FOR SOLUTION ORAL DAILY PRN
Status: DISCONTINUED | OUTPATIENT
Start: 2024-11-22 | End: 2024-11-27 | Stop reason: HOSPADM

## 2024-11-22 RX ORDER — HEPARIN SODIUM 5000 [USP'U]/ML
5000 INJECTION, SOLUTION INTRAVENOUS; SUBCUTANEOUS 2 TIMES DAILY
Status: DISCONTINUED | OUTPATIENT
Start: 2024-11-23 | End: 2024-11-27 | Stop reason: HOSPADM

## 2024-11-22 RX ORDER — MORPHINE SULFATE 2 MG/ML
2 INJECTION, SOLUTION INTRAMUSCULAR; INTRAVENOUS
Status: COMPLETED | OUTPATIENT
Start: 2024-11-22 | End: 2024-11-22

## 2024-11-22 RX ORDER — ONDANSETRON 2 MG/ML
4 INJECTION INTRAMUSCULAR; INTRAVENOUS ONCE
Status: COMPLETED | OUTPATIENT
Start: 2024-11-22 | End: 2024-11-22

## 2024-11-22 RX ORDER — SODIUM CHLORIDE 0.9 % (FLUSH) 0.9 %
5-40 SYRINGE (ML) INJECTION EVERY 12 HOURS SCHEDULED
Status: DISCONTINUED | OUTPATIENT
Start: 2024-11-22 | End: 2024-11-27 | Stop reason: HOSPADM

## 2024-11-22 RX ORDER — IOPAMIDOL 755 MG/ML
100 INJECTION, SOLUTION INTRAVASCULAR
Status: COMPLETED | OUTPATIENT
Start: 2024-11-22 | End: 2024-11-22

## 2024-11-22 RX ORDER — ACETAMINOPHEN 650 MG/1
650 SUPPOSITORY RECTAL EVERY 6 HOURS PRN
Status: DISCONTINUED | OUTPATIENT
Start: 2024-11-22 | End: 2024-11-27 | Stop reason: HOSPADM

## 2024-11-22 RX ORDER — SODIUM CHLORIDE 9 MG/ML
INJECTION, SOLUTION INTRAVENOUS PRN
Status: DISCONTINUED | OUTPATIENT
Start: 2024-11-22 | End: 2024-11-27 | Stop reason: HOSPADM

## 2024-11-22 RX ORDER — 0.9 % SODIUM CHLORIDE 0.9 %
1000 INTRAVENOUS SOLUTION INTRAVENOUS ONCE
Status: COMPLETED | OUTPATIENT
Start: 2024-11-22 | End: 2024-11-22

## 2024-11-22 RX ORDER — ONDANSETRON 2 MG/ML
4 INJECTION INTRAMUSCULAR; INTRAVENOUS EVERY 6 HOURS PRN
Status: DISCONTINUED | OUTPATIENT
Start: 2024-11-22 | End: 2024-11-23

## 2024-11-22 RX ORDER — SODIUM CHLORIDE 0.9 % (FLUSH) 0.9 %
5-40 SYRINGE (ML) INJECTION PRN
Status: DISCONTINUED | OUTPATIENT
Start: 2024-11-22 | End: 2024-11-27 | Stop reason: HOSPADM

## 2024-11-22 RX ORDER — SODIUM CHLORIDE 9 MG/ML
INJECTION, SOLUTION INTRAVENOUS CONTINUOUS
Status: DISPENSED | OUTPATIENT
Start: 2024-11-22 | End: 2024-11-23

## 2024-11-22 RX ADMIN — ONDANSETRON 4 MG: 2 INJECTION INTRAMUSCULAR; INTRAVENOUS at 20:06

## 2024-11-22 RX ADMIN — MORPHINE SULFATE 2 MG: 2 INJECTION, SOLUTION INTRAMUSCULAR; INTRAVENOUS at 20:05

## 2024-11-22 RX ADMIN — Medication: at 20:10

## 2024-11-22 RX ADMIN — IOPAMIDOL 70 ML: 755 INJECTION, SOLUTION INTRAVENOUS at 19:18

## 2024-11-22 RX ADMIN — SODIUM CHLORIDE 1000 ML: 9 INJECTION, SOLUTION INTRAVENOUS at 20:08

## 2024-11-22 RX ADMIN — SODIUM CHLORIDE: 9 INJECTION, SOLUTION INTRAVENOUS at 22:45

## 2024-11-22 ASSESSMENT — PAIN DESCRIPTION - ONSET
ONSET: ON-GOING

## 2024-11-22 ASSESSMENT — PAIN DESCRIPTION - DESCRIPTORS
DESCRIPTORS: ACHING
DESCRIPTORS: SHARP;ACHING
DESCRIPTORS: ACHING
DESCRIPTORS: TENDER

## 2024-11-22 ASSESSMENT — PAIN - FUNCTIONAL ASSESSMENT
PAIN_FUNCTIONAL_ASSESSMENT: ACTIVITIES ARE NOT PREVENTED
PAIN_FUNCTIONAL_ASSESSMENT: ACTIVITIES ARE NOT PREVENTED
PAIN_FUNCTIONAL_ASSESSMENT: PREVENTS OR INTERFERES SOME ACTIVE ACTIVITIES AND ADLS
PAIN_FUNCTIONAL_ASSESSMENT: 0-10
PAIN_FUNCTIONAL_ASSESSMENT: PREVENTS OR INTERFERES SOME ACTIVE ACTIVITIES AND ADLS

## 2024-11-22 ASSESSMENT — PAIN DESCRIPTION - LOCATION
LOCATION: ABDOMEN

## 2024-11-22 ASSESSMENT — PAIN SCALES - GENERAL
PAINLEVEL_OUTOF10: 7
PAINLEVEL_OUTOF10: 7
PAINLEVEL_OUTOF10: 2
PAINLEVEL_OUTOF10: 5

## 2024-11-22 ASSESSMENT — PAIN DESCRIPTION - FREQUENCY
FREQUENCY: INTERMITTENT
FREQUENCY: INTERMITTENT
FREQUENCY: CONTINUOUS
FREQUENCY: INTERMITTENT

## 2024-11-22 ASSESSMENT — PAIN DESCRIPTION - PAIN TYPE
TYPE: ACUTE PAIN

## 2024-11-22 ASSESSMENT — PAIN DESCRIPTION - ORIENTATION
ORIENTATION: RIGHT;LEFT
ORIENTATION: RIGHT;LEFT;UPPER
ORIENTATION: RIGHT;LEFT
ORIENTATION: LEFT;RIGHT

## 2024-11-22 NOTE — ED NOTES
5:36 PM  I have evaluated the patient as the Provider in Rapid Medical Evaluation (RME). I have reviewed her vital signs and the triage nurse assessment. I have talked with the patient and any available family and advised that I am the provider in triage and have ordered the appropriate study to initiate their work up based on the clinical presentation during my assessment. I have advised that the patient will be accommodated in the Main ED as soon as possible. I have also requested to contact the triage nurse or myself immediately if the patient experiences any changes in their condition during this brief waiting period.    84 y.o. female presents to ED with abdominal pain. She has a history of ulcerative colitis s/p total colectomy 30 years ago, history of multiple SBOs. Patient was most recently admitted from 08/27 through 08/30 for a SBO. She reports that she gets infusions regularly for dehydration but has not been able to recently due to the shortage of IV fluids. She has been trying to stay orally rehydrated but reports that the past 2 days she has had abdominal pain, nausea and has not been able to have a bowel movement. Denies any vomiting, fevers, chills. She notes that these symptoms feel similar to previous SBO.     THADDEUS DIAMOND Ashley, PA  11/22/24 3888

## 2024-11-22 NOTE — ED TRIAGE NOTES
Pt comes in from home with CC abd. Pt has a HX of bowel obstructions. Pt has not had a BM in two days.     Pt states she gets \"infusions\", has no colon, and gets dehydrated.

## 2024-11-23 LAB
ANION GAP SERPL CALC-SCNC: 10 MMOL/L (ref 2–12)
BASOPHILS # BLD: 0.1 K/UL (ref 0–0.1)
BASOPHILS NFR BLD: 1 % (ref 0–1)
BUN SERPL-MCNC: 15 MG/DL (ref 6–20)
BUN/CREAT SERPL: 13 (ref 12–20)
CALCIUM SERPL-MCNC: 9.2 MG/DL (ref 8.5–10.1)
CHLORIDE SERPL-SCNC: 106 MMOL/L (ref 97–108)
CO2 SERPL-SCNC: 24 MMOL/L (ref 21–32)
CREAT SERPL-MCNC: 1.14 MG/DL (ref 0.55–1.02)
DIFFERENTIAL METHOD BLD: ABNORMAL
EOSINOPHIL # BLD: 0 K/UL (ref 0–0.4)
EOSINOPHIL NFR BLD: 0 % (ref 0–7)
ERYTHROCYTE [DISTWIDTH] IN BLOOD BY AUTOMATED COUNT: 13.4 % (ref 11.5–14.5)
GLUCOSE SERPL-MCNC: 93 MG/DL (ref 65–100)
HCT VFR BLD AUTO: 38.4 % (ref 35–47)
HGB BLD-MCNC: 12.8 G/DL (ref 11.5–16)
IMM GRANULOCYTES # BLD AUTO: 0 K/UL (ref 0–0.04)
IMM GRANULOCYTES NFR BLD AUTO: 0 % (ref 0–0.5)
LYMPHOCYTES # BLD: 0.9 K/UL (ref 0.8–3.5)
LYMPHOCYTES NFR BLD: 10 % (ref 12–49)
MCH RBC QN AUTO: 31.1 PG (ref 26–34)
MCHC RBC AUTO-ENTMCNC: 33.3 G/DL (ref 30–36.5)
MCV RBC AUTO: 93.2 FL (ref 80–99)
MONOCYTES # BLD: 0.9 K/UL (ref 0–1)
MONOCYTES NFR BLD: 10 % (ref 5–13)
NEUTS SEG # BLD: 7 K/UL (ref 1.8–8)
NEUTS SEG NFR BLD: 79 % (ref 32–75)
NRBC # BLD: 0 K/UL (ref 0–0.01)
NRBC BLD-RTO: 0 PER 100 WBC
PLATELET # BLD AUTO: 160 K/UL (ref 150–400)
POTASSIUM SERPL-SCNC: 3.9 MMOL/L (ref 3.5–5.1)
RBC # BLD AUTO: 4.12 M/UL (ref 3.8–5.2)
SODIUM SERPL-SCNC: 140 MMOL/L (ref 136–145)
WBC # BLD AUTO: 8.9 K/UL (ref 3.6–11)

## 2024-11-23 PROCEDURE — 6360000002 HC RX W HCPCS: Performed by: STUDENT IN AN ORGANIZED HEALTH CARE EDUCATION/TRAINING PROGRAM

## 2024-11-23 PROCEDURE — 36415 COLL VENOUS BLD VENIPUNCTURE: CPT

## 2024-11-23 PROCEDURE — 97161 PT EVAL LOW COMPLEX 20 MIN: CPT

## 2024-11-23 PROCEDURE — 1200000000 HC SEMI PRIVATE

## 2024-11-23 PROCEDURE — 97535 SELF CARE MNGMENT TRAINING: CPT

## 2024-11-23 PROCEDURE — 97116 GAIT TRAINING THERAPY: CPT

## 2024-11-23 PROCEDURE — 97165 OT EVAL LOW COMPLEX 30 MIN: CPT

## 2024-11-23 PROCEDURE — 2580000003 HC RX 258: Performed by: STUDENT IN AN ORGANIZED HEALTH CARE EDUCATION/TRAINING PROGRAM

## 2024-11-23 PROCEDURE — 99232 SBSQ HOSP IP/OBS MODERATE 35: CPT | Performed by: SURGERY

## 2024-11-23 PROCEDURE — 2580000003 HC RX 258: Performed by: SURGERY

## 2024-11-23 PROCEDURE — 85025 COMPLETE CBC W/AUTO DIFF WBC: CPT

## 2024-11-23 PROCEDURE — 80048 BASIC METABOLIC PNL TOTAL CA: CPT

## 2024-11-23 RX ORDER — ONDANSETRON 4 MG/1
4 TABLET, ORALLY DISINTEGRATING ORAL EVERY 4 HOURS PRN
Status: DISCONTINUED | OUTPATIENT
Start: 2024-11-23 | End: 2024-11-27 | Stop reason: HOSPADM

## 2024-11-23 RX ORDER — ONDANSETRON 2 MG/ML
4 INJECTION INTRAMUSCULAR; INTRAVENOUS EVERY 6 HOURS PRN
Status: DISCONTINUED | OUTPATIENT
Start: 2024-11-23 | End: 2024-11-27 | Stop reason: HOSPADM

## 2024-11-23 RX ORDER — DROPERIDOL 2.5 MG/ML
0.62 INJECTION, SOLUTION INTRAMUSCULAR; INTRAVENOUS ONCE
Status: COMPLETED | OUTPATIENT
Start: 2024-11-23 | End: 2024-11-23

## 2024-11-23 RX ORDER — DIATRIZOATE MEGLUMINE AND DIATRIZOATE SODIUM 660; 100 MG/ML; MG/ML
120 SOLUTION ORAL; RECTAL ONCE
Status: COMPLETED | OUTPATIENT
Start: 2024-11-24 | End: 2024-11-24

## 2024-11-23 RX ADMIN — SODIUM CHLORIDE: 9 INJECTION, SOLUTION INTRAVENOUS at 07:06

## 2024-11-23 RX ADMIN — MORPHINE SULFATE 1 MG: 2 INJECTION, SOLUTION INTRAMUSCULAR; INTRAVENOUS at 02:53

## 2024-11-23 RX ADMIN — ONDANSETRON 4 MG: 2 INJECTION INTRAMUSCULAR; INTRAVENOUS at 02:58

## 2024-11-23 RX ADMIN — SODIUM CHLORIDE: 9 INJECTION, SOLUTION INTRAVENOUS at 15:20

## 2024-11-23 RX ADMIN — SODIUM CHLORIDE: 9 INJECTION, SOLUTION INTRAVENOUS at 23:31

## 2024-11-23 RX ADMIN — DROPERIDOL 0.62 MG: 2.5 INJECTION, SOLUTION INTRAMUSCULAR; INTRAVENOUS at 04:44

## 2024-11-23 ASSESSMENT — PAIN DESCRIPTION - ORIENTATION: ORIENTATION: ANTERIOR;UPPER

## 2024-11-23 ASSESSMENT — PAIN SCALES - GENERAL
PAINLEVEL_OUTOF10: 7
PAINLEVEL_OUTOF10: 0

## 2024-11-23 ASSESSMENT — PAIN DESCRIPTION - LOCATION: LOCATION: ABDOMEN

## 2024-11-23 ASSESSMENT — PAIN DESCRIPTION - DESCRIPTORS: DESCRIPTORS: ACHING

## 2024-11-23 ASSESSMENT — PAIN - FUNCTIONAL ASSESSMENT: PAIN_FUNCTIONAL_ASSESSMENT: PREVENTS OR INTERFERES SOME ACTIVE ACTIVITIES AND ADLS

## 2024-11-23 NOTE — CONSULTS
General Surgery Consultation    CC: Lack of bowel function    History of Present Illness:      Emily Banks is a 84 y.o. female who presents with greater than 2 days without a bowel movement.  Patient has a history of a total abdominal colectomy and J-pouch formation for UC over 30 years ago.  Was recently admitted a few months ago for SBO which responded to Gastrografin challenge.  She has no prior history of lysis of adhesions.  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  She presents because of lack of bowel function but not because of pain in particular.  Mild nausea but no emesis.  She reports being treated.  Reports pain at 2 out of 10 at most and dull in nature.  Radiation throughout her abdomen.  Pain medication has helped.  Pressing her abdomen provokes the pain.    No past medical history on file.  Past Surgical History:   Procedure Laterality Date    COLECTOMY      JOINT REPLACEMENT      2019      No family history on file.  Social History     Socioeconomic History    Marital status:    Tobacco Use    Smoking status: Never    Smokeless tobacco: Never   Substance and Sexual Activity    Alcohol use: Not Currently    Drug use: Not Currently     Social Determinants of Health      Received from [employer]    Financial Resource Strain   Food Insecurity: No Food Insecurity (8/28/2024)    Hunger Vital Sign     Worried About Running Out of Food in the Last Year: Never true     Ran Out of Food in the Last Year: Never true   Transportation Needs: No Transportation Needs (8/28/2024)    PRAPARE - Transportation     Lack of Transportation (Medical): No     Lack of Transportation (Non-Medical): No    Received from [employer]    Intimate Partner Violence   Housing Stability: Low Risk  (8/28/2024)    Housing Stability Vital Sign     Unable to Pay for Housing in the Last Year: No     Number of Times Moved in the Last Year: 0     Homeless in the

## 2024-11-23 NOTE — H&P
History & Physical    Primary Care Provider: Carlie Norton MD  Source of Information: Patient and chart review    History of Presenting Illness:   Emily Banks is a 84 y.o. female with hx of uc s/p total colectomy with J-pouch, short bowel syndrome, recurrent sbo, hypothyroidism, gerd, mdd w/ danni who presented to ed with complaints of abdominal pain, nausea and vomiting.  Had been in her usual state of health until 2 days ago when she developed nausea w/o emesis. Also with a dull, aching, 2/10 abdominal pain.  Had recent admission for sbo which was treated with Gastrografin challenge.    The patient denies any fever, chills, chest pain, vomiting, cough, congestion, recent illness, palpitations, or dysuria.  Remarkable vitals on ER Presentation: bp 150/77  Labs Remarkable for: cr 1.28  ER Images: ct abd et eplvis:   ER Rx: 1l ns bolus, zofran     Review of Systems:  Pertinent items are noted in the History of Present Illness.     No past medical history on file.   Past Surgical History:   Procedure Laterality Date    COLECTOMY      JOINT REPLACEMENT      2019     Prior to Admission medications    Medication Sig Start Date End Date Taking? Authorizing Provider   alendronate (FOSAMAX) 70 MG tablet Take 1 tablet by mouth once a week 9/23/11   Diandra Boo MD   amoxicillin (AMOXIL) 500 MG capsule 4 capsules by mouth 1 hour prior to dental work  Patient not taking: Reported on 8/28/2024 1/31/20   Diandra Boo MD   Ascorbic Acid  MG CPCR Take 2 tablets PO BID 9/12/19   Diandra Boo MD   aspirin 81 MG EC tablet Take 1 tablet by mouth daily 4/7/21   Diandra Boo MD   cholestyramine (QUESTRAN) 4 GM/DOSE powder  8/4/23   Diandra Boo MD   clobetasol (TEMOVATE) 0.05 % ointment Apply to affected perirectal area nightly (small amount) as needed for irritation (2-3 days/week) 3/13/12   Diandra Boo MD   diazePAM (VALIUM) 5 MG tablet diazepam 5 mg tablet   TAKE 1-2

## 2024-11-23 NOTE — ED NOTES
ED TO INPATIENT SBAR HANDOFF    Patient Name: Emily Banks   :  1940  84 y.o.   MRN:  [id number]  ED Room #:  ER19/19     Situation  Code Status: Full Code   Allergies: Compazine [prochlorperazine]  Weight: Patient Vitals for the past 96 hrs (Last 3 readings):   Weight   24 1741 38 kg (83 lb 12.4 oz)       Arrived from: home    Chief Complaint:   Chief Complaint   Patient presents with    Abdominal Pain       Hospital Problem/Diagnosis:  Principal Problem:    Volume depletion  Resolved Problems:    * No resolved hospital problems. *      Mobility: no current mobility problem   ED Fall Risk: Presents to emergency department  because of falls (Syncope, seizure, or loss of consciousness): No, Age > 70: No, Altered Mental Status, Intoxication with alcohol or substance confusion (Disorientation, impaired judgment, poor safety awaremess, or inability to follow instructions): No, Impaired Mobility: Ambulates or transfers with assistive devices or assistance; Unable to ambulate or transer.: No, Nursing Judgement: No   Fell in ED or prior to admission: no   Restraints: no     Sitter: no   Family/Caregiver Present: no    Neet to know social/safety information: N/A    Background  History: No past medical history on file.    Assessment    Abnormal Assessment Findings: generalized abd pain, constipation  Imaging:   CT ABDOMEN PELVIS W IV CONTRAST Additional Contrast? None   Final Result   Small bowel obstruction with transition point in the anterior pelvis.      Electronically signed by Vilma Glez        Abnormal labs:   Abnormal Labs Reviewed   COMPREHENSIVE METABOLIC PANEL - Abnormal; Notable for the following components:       Result Value    Chloride 96 (*)     CO2 33 (*)     Glucose 122 (*)     Creatinine 1.28 (*)     Est, Glom Filt Rate 41 (*)     Calcium 11.0 (*)     Alk Phosphatase 42 (*)     Albumin/Globulin Ratio 1.0 (*)     All other components within normal limits       Vitals/MEWS: MEWS Score: 1

## 2024-11-23 NOTE — ED PROVIDER NOTES
PHOSPHORUS       All other labs were within normal range or not returned as of this dictation.    EMERGENCY DEPARTMENT COURSE and DIFFERENTIAL DIAGNOSIS/MDM:   Vitals:    Vitals:    11/22/24 1741   BP: (!) 150/77   Pulse: 60   Resp: 17   Temp: 98 °F (36.7 °C)   TempSrc: Oral   SpO2: 99%   Weight: 38 kg (83 lb 12.4 oz)           Medical Decision Making  84-year-old female with abdominal pain and history of frequent bowel obstructions and dehydration.  Labs today show no major abnormality.  Patient has stable vital signs but ongoing pain in the ER requiring morphine and Zofran.  IV fluids have been ordered and she will be admitted for her dehydration and intractable abdominal pain.  CT abdomen pelvis is pending.    Risk  Prescription drug management.  Decision regarding hospitalization.            REASSESSMENT            CONSULTS:  None    PROCEDURES:  Unless otherwise noted below, none     Procedures    Perfect Serve Consult for Admission  7:54 PM    ED Room Number: ER19/19  Patient Name and age:  Emily Banks 84 y.o.  female  Working Diagnosis:   1. Generalized abdominal pain    2. Dehydration        COVID-19 Suspicion: No  Sepsis present:  No  Reassessment needed: No  Code Status:  Full Code  Readmission: No  Isolation Requirements: no  Recommended Level of Care: telemetry  Department: [facility] Adult ED - [phone]  Consulting Provider: Arti    Other:  84-year-old female with abdominal pain and history of frequent bowel obstructions and dehydration.  Labs today show no major abnormality.  Patient has stable vital signs but ongoing pain in the ER requiring morphine and Zofran.  IV fluids have been ordered and she will be admitted for her dehydration and intractable abdominal pain.  CT abdomen pelvis is pending.       FINAL IMPRESSION      1. Generalized abdominal pain    2. Dehydration          DISPOSITION/PLAN   DISPOSITION Admitted 11/22/2024 07:44:37 PM      PATIENT REFERRED TO:  No follow-up provider

## 2024-11-24 ENCOUNTER — APPOINTMENT (OUTPATIENT)
Facility: HOSPITAL | Age: 84
DRG: 389 | End: 2024-11-24
Payer: MEDICARE

## 2024-11-24 LAB
ANION GAP SERPL CALC-SCNC: 12 MMOL/L (ref 2–12)
BASOPHILS # BLD: 0.1 K/UL (ref 0–0.1)
BASOPHILS NFR BLD: 1 % (ref 0–1)
BUN SERPL-MCNC: 11 MG/DL (ref 6–20)
BUN/CREAT SERPL: 11 (ref 12–20)
CALCIUM SERPL-MCNC: 7.7 MG/DL (ref 8.5–10.1)
CHLORIDE SERPL-SCNC: 110 MMOL/L (ref 97–108)
CO2 SERPL-SCNC: 20 MMOL/L (ref 21–32)
CREAT SERPL-MCNC: 0.97 MG/DL (ref 0.55–1.02)
DIFFERENTIAL METHOD BLD: ABNORMAL
EOSINOPHIL # BLD: 0.1 K/UL (ref 0–0.4)
EOSINOPHIL NFR BLD: 1 % (ref 0–7)
ERYTHROCYTE [DISTWIDTH] IN BLOOD BY AUTOMATED COUNT: 13.6 % (ref 11.5–14.5)
GLUCOSE BLD STRIP.AUTO-MCNC: 96 MG/DL (ref 65–117)
GLUCOSE SERPL-MCNC: 68 MG/DL (ref 65–100)
HCT VFR BLD AUTO: 35.2 % (ref 35–47)
HGB BLD-MCNC: 11.2 G/DL (ref 11.5–16)
IMM GRANULOCYTES # BLD AUTO: 0 K/UL (ref 0–0.04)
IMM GRANULOCYTES NFR BLD AUTO: 0 % (ref 0–0.5)
LYMPHOCYTES # BLD: 0.7 K/UL (ref 0.8–3.5)
LYMPHOCYTES NFR BLD: 13 % (ref 12–49)
MAGNESIUM SERPL-MCNC: 1.7 MG/DL (ref 1.6–2.4)
MCH RBC QN AUTO: 30.9 PG (ref 26–34)
MCHC RBC AUTO-ENTMCNC: 31.8 G/DL (ref 30–36.5)
MCV RBC AUTO: 97 FL (ref 80–99)
MONOCYTES # BLD: 0.8 K/UL (ref 0–1)
MONOCYTES NFR BLD: 14 % (ref 5–13)
NEUTS SEG # BLD: 4 K/UL (ref 1.8–8)
NEUTS SEG NFR BLD: 71 % (ref 32–75)
NRBC # BLD: 0 K/UL (ref 0–0.01)
NRBC BLD-RTO: 0 PER 100 WBC
PLATELET # BLD AUTO: 142 K/UL (ref 150–400)
PMV BLD AUTO: 12.5 FL (ref 8.9–12.9)
POTASSIUM SERPL-SCNC: 3.6 MMOL/L (ref 3.5–5.1)
RBC # BLD AUTO: 3.63 M/UL (ref 3.8–5.2)
RBC MORPH BLD: ABNORMAL
SERVICE CMNT-IMP: NORMAL
SODIUM SERPL-SCNC: 142 MMOL/L (ref 136–145)
WBC # BLD AUTO: 5.7 K/UL (ref 3.6–11)

## 2024-11-24 PROCEDURE — 82962 GLUCOSE BLOOD TEST: CPT

## 2024-11-24 PROCEDURE — 74018 RADEX ABDOMEN 1 VIEW: CPT

## 2024-11-24 PROCEDURE — 85025 COMPLETE CBC W/AUTO DIFF WBC: CPT

## 2024-11-24 PROCEDURE — 36415 COLL VENOUS BLD VENIPUNCTURE: CPT

## 2024-11-24 PROCEDURE — 80048 BASIC METABOLIC PNL TOTAL CA: CPT

## 2024-11-24 PROCEDURE — 83735 ASSAY OF MAGNESIUM: CPT

## 2024-11-24 PROCEDURE — 1200000000 HC SEMI PRIVATE

## 2024-11-24 PROCEDURE — 6360000002 HC RX W HCPCS: Performed by: HOSPITALIST

## 2024-11-24 PROCEDURE — 6360000004 HC RX CONTRAST MEDICATION: Performed by: SURGERY

## 2024-11-24 PROCEDURE — 2580000003 HC RX 258: Performed by: HOSPITALIST

## 2024-11-24 PROCEDURE — 99232 SBSQ HOSP IP/OBS MODERATE 35: CPT | Performed by: SURGERY

## 2024-11-24 RX ORDER — SODIUM CHLORIDE, SODIUM LACTATE, POTASSIUM CHLORIDE, CALCIUM CHLORIDE 600; 310; 30; 20 MG/100ML; MG/100ML; MG/100ML; MG/100ML
INJECTION, SOLUTION INTRAVENOUS CONTINUOUS
Status: DISCONTINUED | OUTPATIENT
Start: 2024-11-24 | End: 2024-11-25

## 2024-11-24 RX ORDER — LORAZEPAM 0.5 MG/1
0.5 TABLET ORAL 2 TIMES DAILY PRN
Status: DISCONTINUED | OUTPATIENT
Start: 2024-11-24 | End: 2024-11-24

## 2024-11-24 RX ORDER — KETOROLAC TROMETHAMINE 30 MG/ML
15 INJECTION, SOLUTION INTRAMUSCULAR; INTRAVENOUS ONCE
Status: COMPLETED | OUTPATIENT
Start: 2024-11-24 | End: 2024-11-24

## 2024-11-24 RX ORDER — LORAZEPAM 2 MG/ML
0.5 INJECTION INTRAMUSCULAR 2 TIMES DAILY PRN
Status: DISCONTINUED | OUTPATIENT
Start: 2024-11-24 | End: 2024-11-26

## 2024-11-24 RX ADMIN — KETOROLAC TROMETHAMINE 15 MG: 30 INJECTION, SOLUTION INTRAMUSCULAR at 08:31

## 2024-11-24 RX ADMIN — LORAZEPAM 0.5 MG: 2 INJECTION INTRAMUSCULAR; INTRAVENOUS at 10:46

## 2024-11-24 RX ADMIN — SODIUM CHLORIDE, POTASSIUM CHLORIDE, SODIUM LACTATE AND CALCIUM CHLORIDE: 600; 310; 30; 20 INJECTION, SOLUTION INTRAVENOUS at 13:56

## 2024-11-24 RX ADMIN — DIATRIZOATE MEGLUMINE AND DIATRIZOATE SODIUM 120 ML: 660; 100 LIQUID ORAL; RECTAL at 07:06

## 2024-11-24 RX ADMIN — SODIUM CHLORIDE, POTASSIUM CHLORIDE, SODIUM LACTATE AND CALCIUM CHLORIDE: 600; 310; 30; 20 INJECTION, SOLUTION INTRAVENOUS at 21:56

## 2024-11-24 ASSESSMENT — PAIN DESCRIPTION - LOCATION: LOCATION: ABDOMEN

## 2024-11-24 ASSESSMENT — PAIN DESCRIPTION - DESCRIPTORS: DESCRIPTORS: ACHING

## 2024-11-24 ASSESSMENT — PAIN SCALES - GENERAL
PAINLEVEL_OUTOF10: 8
PAINLEVEL_OUTOF10: 0

## 2024-11-25 PROCEDURE — 1200000000 HC SEMI PRIVATE

## 2024-11-25 PROCEDURE — 99232 SBSQ HOSP IP/OBS MODERATE 35: CPT | Performed by: SURGERY

## 2024-11-25 PROCEDURE — 2580000003 HC RX 258: Performed by: HOSPITALIST

## 2024-11-25 PROCEDURE — 97116 GAIT TRAINING THERAPY: CPT

## 2024-11-25 PROCEDURE — 6360000002 HC RX W HCPCS: Performed by: HOSPITALIST

## 2024-11-25 PROCEDURE — 6370000000 HC RX 637 (ALT 250 FOR IP): Performed by: HOSPITALIST

## 2024-11-25 RX ORDER — SODIUM CHLORIDE 9 MG/ML
INJECTION, SOLUTION INTRAVENOUS CONTINUOUS
Status: DISCONTINUED | OUTPATIENT
Start: 2024-11-25 | End: 2024-11-27 | Stop reason: HOSPADM

## 2024-11-25 RX ORDER — ESCITALOPRAM OXALATE 10 MG/1
20 TABLET ORAL EVERY MORNING
Status: DISCONTINUED | OUTPATIENT
Start: 2024-11-25 | End: 2024-11-27 | Stop reason: HOSPADM

## 2024-11-25 RX ORDER — LEVOTHYROXINE SODIUM 25 UG/1
25 TABLET ORAL DAILY
Status: DISCONTINUED | OUTPATIENT
Start: 2024-11-25 | End: 2024-11-27 | Stop reason: HOSPADM

## 2024-11-25 RX ORDER — PANTOPRAZOLE SODIUM 20 MG/1
20 TABLET, DELAYED RELEASE ORAL
Status: DISCONTINUED | OUTPATIENT
Start: 2024-11-25 | End: 2024-11-27 | Stop reason: HOSPADM

## 2024-11-25 RX ADMIN — LEVOTHYROXINE SODIUM 25 MCG: 0.03 TABLET ORAL at 14:02

## 2024-11-25 RX ADMIN — ESCITALOPRAM OXALATE 20 MG: 10 TABLET ORAL at 14:02

## 2024-11-25 RX ADMIN — LORAZEPAM 0.5 MG: 2 INJECTION INTRAMUSCULAR; INTRAVENOUS at 21:11

## 2024-11-25 RX ADMIN — SODIUM CHLORIDE: 9 INJECTION, SOLUTION INTRAVENOUS at 21:16

## 2024-11-25 RX ADMIN — SODIUM CHLORIDE: 9 INJECTION, SOLUTION INTRAVENOUS at 10:05

## 2024-11-25 NOTE — CARE COORDINATION
Transition of Care:    CM attempted to see for admission and IMM letter on 11/24 - patient completely asleep.    CM notes:  for PT recommended  
Transportation Car   Discharge Planning   Type of Residence House   Living Arrangements Alone   Current Services Prior To Admission None   Potential Assistance Needed Home Care   Potential Assistance Purchasing Medications No   Type of Home Care Services PT   Patient expects to be discharged to: House   One/Two Story Residence Two story   Services At/After Discharge   Transition of Care Consult (CM Consult) Discharge Planning;Home Health   Internal Home Health No   Reason Outside Agency Chosen Unable to staff case   Mode of Transport at Discharge Other (see comment)   Confirm Follow Up Transport Family   Condition of Participation: Discharge Planning   The Plan for Transition of Care is related to the following treatment goals: Home Health   The Patient and/or Patient Representative was provided with a Choice of Provider? Patient   The Patient and/Or Patient Representative agree with the Discharge Plan? Yes   Freedom of Choice list was provided with basic dialogue that supports the patient's individualized plan of care/goals, treatment preferences, and shares the quality data associated with the providers?  Yes

## 2024-11-26 LAB
ALBUMIN SERPL-MCNC: 2.4 G/DL (ref 3.5–5)
ANION GAP SERPL CALC-SCNC: 5 MMOL/L (ref 2–12)
BASOPHILS # BLD: 0 K/UL (ref 0–0.1)
BASOPHILS NFR BLD: 1 % (ref 0–1)
BUN SERPL-MCNC: 9 MG/DL (ref 6–20)
BUN/CREAT SERPL: 11 (ref 12–20)
CALCIUM SERPL-MCNC: 7.6 MG/DL (ref 8.5–10.1)
CHLORIDE SERPL-SCNC: 115 MMOL/L (ref 97–108)
CO2 SERPL-SCNC: 24 MMOL/L (ref 21–32)
CREAT SERPL-MCNC: 0.84 MG/DL (ref 0.55–1.02)
DIFFERENTIAL METHOD BLD: ABNORMAL
EOSINOPHIL # BLD: 0.1 K/UL (ref 0–0.4)
EOSINOPHIL NFR BLD: 3 % (ref 0–7)
ERYTHROCYTE [DISTWIDTH] IN BLOOD BY AUTOMATED COUNT: 13.7 % (ref 11.5–14.5)
GLUCOSE SERPL-MCNC: 92 MG/DL (ref 65–100)
HCT VFR BLD AUTO: 30.8 % (ref 35–47)
HGB BLD-MCNC: 10.3 G/DL (ref 11.5–16)
IMM GRANULOCYTES # BLD AUTO: 0 K/UL (ref 0–0.04)
IMM GRANULOCYTES NFR BLD AUTO: 0 % (ref 0–0.5)
LYMPHOCYTES # BLD: 1.2 K/UL (ref 0.8–3.5)
LYMPHOCYTES NFR BLD: 36 % (ref 12–49)
MAGNESIUM SERPL-MCNC: 2 MG/DL (ref 1.6–2.4)
MCH RBC QN AUTO: 31.2 PG (ref 26–34)
MCHC RBC AUTO-ENTMCNC: 33.4 G/DL (ref 30–36.5)
MCV RBC AUTO: 93.3 FL (ref 80–99)
MONOCYTES # BLD: 0.4 K/UL (ref 0–1)
MONOCYTES NFR BLD: 13 % (ref 5–13)
NEUTS SEG # BLD: 1.6 K/UL (ref 1.8–8)
NEUTS SEG NFR BLD: 47 % (ref 32–75)
NRBC # BLD: 0 K/UL (ref 0–0.01)
NRBC BLD-RTO: 0 PER 100 WBC
PHOSPHATE SERPL-MCNC: 1.2 MG/DL (ref 2.6–4.7)
PLATELET # BLD AUTO: 139 K/UL (ref 150–400)
PMV BLD AUTO: 12.5 FL (ref 8.9–12.9)
POTASSIUM SERPL-SCNC: 3.8 MMOL/L (ref 3.5–5.1)
RBC # BLD AUTO: 3.3 M/UL (ref 3.8–5.2)
SODIUM SERPL-SCNC: 144 MMOL/L (ref 136–145)
WBC # BLD AUTO: 3.3 K/UL (ref 3.6–11)

## 2024-11-26 PROCEDURE — 99232 SBSQ HOSP IP/OBS MODERATE 35: CPT | Performed by: SURGERY

## 2024-11-26 PROCEDURE — 1200000000 HC SEMI PRIVATE

## 2024-11-26 PROCEDURE — 80069 RENAL FUNCTION PANEL: CPT

## 2024-11-26 PROCEDURE — 2500000003 HC RX 250 WO HCPCS: Performed by: HOSPITALIST

## 2024-11-26 PROCEDURE — 83735 ASSAY OF MAGNESIUM: CPT

## 2024-11-26 PROCEDURE — 85025 COMPLETE CBC W/AUTO DIFF WBC: CPT

## 2024-11-26 PROCEDURE — 2580000003 HC RX 258: Performed by: HOSPITALIST

## 2024-11-26 PROCEDURE — 6370000000 HC RX 637 (ALT 250 FOR IP)

## 2024-11-26 PROCEDURE — 97116 GAIT TRAINING THERAPY: CPT

## 2024-11-26 PROCEDURE — 6370000000 HC RX 637 (ALT 250 FOR IP): Performed by: HOSPITALIST

## 2024-11-26 RX ORDER — LORAZEPAM 0.5 MG/1
0.5 TABLET ORAL 2 TIMES DAILY PRN
Status: DISCONTINUED | OUTPATIENT
Start: 2024-11-26 | End: 2024-11-27 | Stop reason: HOSPADM

## 2024-11-26 RX ADMIN — POTASSIUM & SODIUM PHOSPHATES POWDER PACK 280-160-250 MG 500 MG: 280-160-250 PACK at 10:00

## 2024-11-26 RX ADMIN — ESCITALOPRAM OXALATE 20 MG: 10 TABLET ORAL at 09:54

## 2024-11-26 RX ADMIN — LORAZEPAM 0.5 MG: 0.5 TABLET ORAL at 20:49

## 2024-11-26 RX ADMIN — SODIUM CHLORIDE: 9 INJECTION, SOLUTION INTRAVENOUS at 07:13

## 2024-11-26 RX ADMIN — LEVOTHYROXINE SODIUM 25 MCG: 0.03 TABLET ORAL at 07:12

## 2024-11-26 RX ADMIN — POTASSIUM PHOSPHATE, MONOBASIC AND POTASSIUM PHOSPHATE, DIBASIC 15 MMOL: 224; 236 INJECTION, SOLUTION, CONCENTRATE INTRAVENOUS at 10:00

## 2024-11-26 NOTE — PLAN OF CARE
Problem: Discharge Planning  Goal: Discharge to home or other facility with appropriate resources  11/23/2024 1041 by Joce Santoyo, RN  Outcome: Progressing  11/23/2024 0418 by Lory Jaramillo, RN  Outcome: Progressing     
  Problem: Discharge Planning  Goal: Discharge to home or other facility with appropriate resources  11/24/2024 1136 by Joce Santoyo, RN  Outcome: Progressing  11/24/2024 0225 by Lory Jaramillo, RN  Outcome: Progressing     
  Problem: Discharge Planning  Goal: Discharge to home or other facility with appropriate resources  11/25/2024 0048 by Lory Jaramillo, CINDY  Outcome: Progressing  11/24/2024 1136 by Joce Santoyo, RN  Outcome: Progressing     Problem: Pain  Goal: Verbalizes/displays adequate comfort level or baseline comfort level  11/25/2024 0048 by Lory Jaramillo, CINDY  Outcome: Progressing  11/24/2024 1136 by Joce Santoyo, RN  Outcome: Progressing     Problem: Safety - Adult  Goal: Free from fall injury  11/25/2024 0048 by Lory Jaramillo, CINDY  Outcome: Progressing  11/24/2024 1136 by Joce Santoyo, RN  Outcome: Progressing     
  Problem: Discharge Planning  Goal: Discharge to home or other facility with appropriate resources  Outcome: Progressing     Problem: Pain  Goal: Verbalizes/displays adequate comfort level or baseline comfort level  Outcome: Progressing     Problem: Safety - Adult  Goal: Free from fall injury  Outcome: Progressing     
  Problem: Discharge Planning  Goal: Discharge to home or other facility with appropriate resources  Outcome: Progressing     Problem: Safety - Adult  Goal: Free from fall injury  Outcome: Progressing     
  Problem: Physical Therapy - Adult  Goal: By Discharge: Performs mobility at highest level of function for planned discharge setting.  See evaluation for individualized goals.  Description: FUNCTIONAL STATUS PRIOR TO ADMISSION: Patient was independent and active without use of DME.    HOME SUPPORT PRIOR TO ADMISSION: The patient lived alone with no local support. Is inquiring into short term assistance at this time.     Physical Therapy Goals  Initiated 11/23/2024  1.  Patient will move from supine to sit and sit to supine, scoot up and down, and roll side to side in bed with independence within 7 day(s).    2.  Patient will perform sit to stand with independence within 7 day(s).  3.  Patient will transfer from bed to chair and chair to bed with independence using the least restrictive device within 7 day(s).  4.  Patient will ambulate with independence for 340 feet with the least restrictive device within 7 day(s).   5.  Patient will ascend/descend 12 stairs with 1 handrail(s) with modified independence within 7 day(s).    Outcome: Progressing   PHYSICAL THERAPY TREATMENT    Patient: Emily Banks (84 y.o. female)  Date: 11/26/2024  Diagnosis: Dehydration [E86.0]  Generalized abdominal pain [R10.84]  Volume depletion [E86.9] Volume depletion                              ASSESSMENT:  Patient continues to benefit from skilled PT services and is progressing towards goals. Pt presents with improving strength, balance, and activity tolerance.  Pt demonstrates independence with bed mobility and transfers.  She ambulates with a steady gait with good pace and tolerance. Pt also demonstrated good balance while turning in place to untangle the IV line on 2 occasions.  Pt reports she ambulated 2 other times with nursing staff today.   Pt is likely near her baseline level of function.           PLAN:  Patient continues to benefit from skilled intervention to address the above impairments.  Continue treatment per established 
living setting    Other factors to consider for discharge: poor safety awareness, high risk for falls, and concern for safely navigating or managing the home environment    IF patient discharges home will need the following DME: none       SUBJECTIVE:   Patient stated, \"I need to use the bathroom.\"    OBJECTIVE DATA SUMMARY:   Critical Behavior:          Functional Mobility Training:  Bed Mobility:  Bed Mobility Training  Overall Level of Assistance: Independent  Rolling: Independent  Supine to Sit: Independent  Sit to Supine: Independent  Scooting: Independent  Transfers:  Transfer Training  Overall Level of Assistance: Independent;Supervision  Sit to Stand: Supervision;Independent  Stand to Sit: Independent;Supervision  Balance:  Balance  Sitting: Intact  Sitting - Static: Good (unsupported)  Sitting - Dynamic: Good (unsupported)  Standing: Impaired  Standing - Static: Constant support;Good  Standing - Dynamic: Good;Constant support   Ambulation/Gait Training:     Gait  Gait Training: Yes  Overall Level of Assistance: Supervision  Distance (ft): 700 Feet  Assistive Device: None  Interventions: Safety awareness training  Base of Support: Widened  Speed/Jillian: Shuffled;Slow  Stance: Weight shift  Gait Abnormalities: Shuffling gait;Decreased step clearance        Neuro Re-Education:                    Pain Rating:pain is at a level acceptable to the patient    Activity Tolerance:   Good and Fair     After treatment:   Patient left in no apparent distress sitting up in chair and Call bell within reach      COMMUNICATION/EDUCATION:   The patient's plan of care was discussed with: registered nurse and            Lorene Leija, PT  Minutes: 27    
Training  Bed Mobility Training: Yes  Overall Level of Assistance: Independent  Rolling: Independent  Supine to Sit: Independent  Sit to Supine: Independent  Scooting: Independent  Transfers:     Transfer Training  Transfer Training: Yes  Overall Level of Assistance: Contact-guard assistance  Sit to Stand: Contact-guard assistance  Stand to Sit: Contact-guard assistance  Stand Pivot Transfers: Contact-guard assistance  Balance:               Balance  Sitting: Intact  Sitting - Static: Good (unsupported)  Sitting - Dynamic: Good (unsupported)  Standing: Impaired  Standing - Static: Constant support;Good;Fair  Standing - Dynamic: Fair;Good;Constant support  Ambulation/Gait Training:    Gait  Gait Training: Yes  Overall Level of Assistance: Contact-guard assistance  Distance (ft): 340 Feet  Assistive Device: None;Other (comment) (HHA)  Interventions: Safety awareness training  Base of Support: Widened  Speed/Jillian: Shuffled;Slow  Step Length: Right shortened;Left shortened  Stance: Weight shift  Gait Abnormalities: Shuffling gait;Decreased step clearance (HHA)                                                                                                                                                                                                                                       [facility] AM-PAC®      Basic Mobility Inpatient Short Form (6-Clicks) Version 2  How much HELP from another person do you currently need... (If the patient hasn't done an activity recently, how much help from another person do you think they would need if they tried?) Total A Lot A Little None   1.  Turning from your back to your side while in a flat bed without using bedrails? []  1 []  2 []  3  [x]  4   2.  Moving from lying on your back to sitting on the side of a flat bed without using bedrails? []  1 []  2 []  3  [x]  4   3.  Moving to and from a bed to a chair (including a wheelchair)? []  1 []  2 []  3  [x]  4   4.

## 2024-11-27 ENCOUNTER — HOSPITAL ENCOUNTER (OUTPATIENT)
Facility: HOSPITAL | Age: 84
Setting detail: INFUSION SERIES
End: 2024-11-27

## 2024-11-27 VITALS
OXYGEN SATURATION: 94 % | SYSTOLIC BLOOD PRESSURE: 132 MMHG | WEIGHT: 83.78 LBS | BODY MASS INDEX: 15.82 KG/M2 | RESPIRATION RATE: 17 BRPM | HEART RATE: 61 BPM | TEMPERATURE: 98.1 F | DIASTOLIC BLOOD PRESSURE: 68 MMHG

## 2024-11-27 LAB
ALBUMIN SERPL-MCNC: 2.4 G/DL (ref 3.5–5)
ANION GAP SERPL CALC-SCNC: 4 MMOL/L (ref 2–12)
BASOPHILS # BLD: 0 K/UL (ref 0–0.1)
BASOPHILS NFR BLD: 1 % (ref 0–1)
BUN SERPL-MCNC: 7 MG/DL (ref 6–20)
BUN/CREAT SERPL: 8 (ref 12–20)
CALCIUM SERPL-MCNC: 7.3 MG/DL (ref 8.5–10.1)
CHLORIDE SERPL-SCNC: 116 MMOL/L (ref 97–108)
CO2 SERPL-SCNC: 25 MMOL/L (ref 21–32)
CREAT SERPL-MCNC: 0.87 MG/DL (ref 0.55–1.02)
DIFFERENTIAL METHOD BLD: ABNORMAL
EOSINOPHIL # BLD: 0.1 K/UL (ref 0–0.4)
EOSINOPHIL NFR BLD: 2 % (ref 0–7)
ERYTHROCYTE [DISTWIDTH] IN BLOOD BY AUTOMATED COUNT: 13.8 % (ref 11.5–14.5)
GLUCOSE SERPL-MCNC: 79 MG/DL (ref 65–100)
HCT VFR BLD AUTO: 32.2 % (ref 35–47)
HGB BLD-MCNC: 10.3 G/DL (ref 11.5–16)
IMM GRANULOCYTES # BLD AUTO: 0 K/UL (ref 0–0.04)
IMM GRANULOCYTES NFR BLD AUTO: 0 % (ref 0–0.5)
LYMPHOCYTES # BLD: 1.1 K/UL (ref 0.8–3.5)
LYMPHOCYTES NFR BLD: 33 % (ref 12–49)
MAGNESIUM SERPL-MCNC: 1.6 MG/DL (ref 1.6–2.4)
MCH RBC QN AUTO: 30.3 PG (ref 26–34)
MCHC RBC AUTO-ENTMCNC: 32 G/DL (ref 30–36.5)
MCV RBC AUTO: 94.7 FL (ref 80–99)
MONOCYTES # BLD: 0.4 K/UL (ref 0–1)
MONOCYTES NFR BLD: 12 % (ref 5–13)
NEUTS SEG # BLD: 1.8 K/UL (ref 1.8–8)
NEUTS SEG NFR BLD: 52 % (ref 32–75)
NRBC # BLD: 0 K/UL (ref 0–0.01)
NRBC BLD-RTO: 0 PER 100 WBC
PHOSPHATE SERPL-MCNC: 1.8 MG/DL (ref 2.6–4.7)
PLATELET # BLD AUTO: 136 K/UL (ref 150–400)
PMV BLD AUTO: 12.4 FL (ref 8.9–12.9)
POTASSIUM SERPL-SCNC: 3.6 MMOL/L (ref 3.5–5.1)
RBC # BLD AUTO: 3.4 M/UL (ref 3.8–5.2)
SODIUM SERPL-SCNC: 145 MMOL/L (ref 136–145)
WBC # BLD AUTO: 3.3 K/UL (ref 3.6–11)

## 2024-11-27 PROCEDURE — 99232 SBSQ HOSP IP/OBS MODERATE 35: CPT | Performed by: SURGERY

## 2024-11-27 PROCEDURE — 83735 ASSAY OF MAGNESIUM: CPT

## 2024-11-27 PROCEDURE — 2580000003 HC RX 258: Performed by: HOSPITALIST

## 2024-11-27 PROCEDURE — 6370000000 HC RX 637 (ALT 250 FOR IP): Performed by: HOSPITALIST

## 2024-11-27 PROCEDURE — 85025 COMPLETE CBC W/AUTO DIFF WBC: CPT

## 2024-11-27 PROCEDURE — 36415 COLL VENOUS BLD VENIPUNCTURE: CPT

## 2024-11-27 PROCEDURE — 80069 RENAL FUNCTION PANEL: CPT

## 2024-11-27 RX ADMIN — SODIUM CHLORIDE: 9 INJECTION, SOLUTION INTRAVENOUS at 06:37

## 2024-11-27 RX ADMIN — ESCITALOPRAM OXALATE 20 MG: 10 TABLET ORAL at 09:08

## 2024-11-27 RX ADMIN — POTASSIUM & SODIUM PHOSPHATES POWDER PACK 280-160-250 MG 500 MG: 280-160-250 PACK at 09:08

## 2024-11-27 RX ADMIN — LEVOTHYROXINE SODIUM 25 MCG: 0.03 TABLET ORAL at 06:35

## 2024-11-27 NOTE — DISCHARGE SUMMARY
Discharge Summary       PATIENT ID: Emily Banks  MRN: [id number]   YOB: 1940    DATE OF ADMISSION: 11/22/2024  7:30 PM    DATE OF DISCHARGE: 11/27/2024   PRIMARY CARE PROVIDER: Carlie Norton MD     ATTENDING PHYSICIAN: Artur  DISCHARGING PROVIDER: Viktor Siddiqui MD    To contact this individual call [phone] and ask the  to page.  If unavailable ask to be transferred the Adult Hospitalist Department.    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 female with hx of uc s/p total colectomy with J-pouch, short bowel syndrome, recurrent sbo, hypothyroidism, gerd, mdd w/ theodore who presented to ed with complaints of abdominal pain, nausea and vomiting. Had recent admission for sbo which was treated with Gastrografin challenge.    -s/p gastrografin challenge with resolution   -tolerating diet  -s/p IVF hydration  -f/u w/ general surgery outpatient to arrange outpatient fluid infusions        DISCHARGE DIAGNOSES / PLAN:        FOLLOW UP APPOINTMENTS:    Follow-up Information       Follow up With Specialties Details Why Contact Info    Parminder Salazar MD General Surgery, Bariatrics Follow up in 10 day(s)  [address] [place] [id number] [phone]                  DISCHARGE MEDICATIONS:     Medication List        CHANGE how you take these medications      simvastatin 10 MG tablet  Commonly known as: ZOCOR  What changed: Another medication with the same name was removed. Continue taking this medication, and follow the directions you see here.     Synthroid 25 MCG tablet  Generic drug: levothyroxine  What changed: Another medication with the same name was removed. Continue taking this medication, and follow the directions you see here.            CONTINUE taking these medications      Ascorbic Acid

## 2024-11-27 NOTE — PROGRESS NOTES
[facility]                                                                                          Hospitalist Progress Note  Viktor Siddiqui MD  Office Phone: [phone]        Date of Service:  2024  NAME:  Emily Banks  :  1940  MRN:  [id number]       Admission Summary:   84 y.o. female with hx of uc s/p total colectomy with J-pouch, short bowel syndrome, recurrent sbo, hypothyroidism, gerd, mdd w/ theodore who presented to ed with complaints of abdominal pain, nausea and vomiting.  Had been in her usual state of health until 2 days ago when she developed nausea w/o emesis. Also with a dull, aching, 2/10 abdominal pain.  Had recent admission for sbo which was treated with Gastrografin challenge.       Interval history / Subjective:   Tolerating small amounts of regular diet, had a bm, no n/v     Assessment & Plan:     Small bowel obstruction  UC s/p total colectomy with J-pouch  -Patient with recurrent episodes of small bowel obstruction  -diet advanced  -continue IVNS  -s/p gastrografin challenge with resolution     Hypothyroidism / Dyslipidemia / MDD w/ THEODORE  -Resumed meds    GERD    On PRN lorazepam for anxiety     Code status: full  Prophylaxis: birdie  Care Plan discussed with:   Anticipated Disposition:   Inpatient  Cardiac monitoring: None  Central Line:            Social Determinants of Health     Tobacco Use: Low Risk  (2024)    Patient History     Smoking Tobacco Use: Never     Smokeless Tobacco Use: Never     Passive Exposure: Not on file   Alcohol Use: Not on file   Financial Resource Strain: Low Risk  (2024)    Received from [employer]    Financial Resource Strain     Difficulty of Paying Living Expenses: Not on file     Access to Reliable Phone: Not on file   Food Insecurity: No Food Insecurity (2024)    Hunger Vital Sign     Worried About Running Out of Food in the Last Year: Never true     Ran Out of Food in the Last 
CT scan reviewed. Fairly similar to prior admission on August. Stomach large. Without emesis or significant nausea can hold off on NGT. IF nausea or emesis we need to place an NGT and place to lower intermittent suction.    Plan for bowel rest, hydration with IVF, and possible gastrographin study in next 24-48 hours.    Will assess in person later tonight or morning.    Parminder Salazar MD, FACS, [facility]  Bariatric and General Surgeon  [facility]    
Surgery Progress Note    11/23/2024    Admit Date: 11/22/2024  7:30 PM    CC: Nausea    Subjective:     Nausea overnight but no emesis.  Abdominal pain is stable.  No flatus.    Constitutional: No fever or chills  Neurologic: No headache  Eyes: No scleral icterus or irritated eyes  Nose: No nasal pain or drainage  Mouth: No oral lesions or sore throat  Cardiac: No palpations or chest pain  Pulmonary: No cough or shortness of breath  Gastrointestinal: Abdominal pain, nausea, no emesis, diarrhea, or constipation  Genitourinary: No dysuria  Musculoskeletal: No muscle or joint tenderness  Skin: No rashes or lesions  Psychiatric: No anxiety or depressed mood    Objective:     Vitals:    11/23/24 0756   BP: (!) 97/59   Pulse: 64   Resp: 14   Temp: 98.6 °F (37 °C)   SpO2: 95%       General: No acute distress, conversant  Eyes: PERRLA, no scleral icterus  HENT: Normocephalic without oral lesions  Neck: Trachea midline without LAD  Cardiac: Normal pulse rate and rhythm  Pulmonary: Symmetric chest rise with normal effort  GI: Soft, mild diffuse abdominal pain, ND, no hernia, no splenomegaly  Skin: Warm without rash  Extremities: No edema or joint stiffness  Psych: Appropriate mood and affect    Labs, vital signs, and I/O reviewed.    Assessment:     84-year-old female with a history of SBO secondary to total abdominal colectomy and ileoanal J-pouch for UC years ago with second SBO in 3 months    Plan:     Persistent nausea.  Patient would like to hold off on NG tube.  Continue hydration.  HUSSAIN is improving.  Ambulate.  If she continues to feel nauseous or starts to vomit may consider NG tube for decompression.  Plan for Gastrografin challenge in the morning.    Parminder Salazar MD, FACS, [facility]  Bariatric and General Surgeon  [facility]    
Surgery Progress Note    11/24/2024    Admit Date: 11/22/2024  7:30 PM    CC: Nausea resolved    Subjective:     Patient reports passing gas this morning.  Contrast given around 7 in the morning.  She would like to have the contrast checked frequently for its progress.    Constitutional: No fever or chills  Neurologic: No headache  Eyes: No scleral icterus or irritated eyes  Nose: No nasal pain or drainage  Mouth: No oral lesions or sore throat  Cardiac: No palpations or chest pain  Pulmonary: No cough or shortness of breath  Gastrointestinal: Resolved abdominal pain, no nausea, no emesis, diarrhea, or constipation  Genitourinary: No dysuria  Musculoskeletal: No muscle or joint tenderness  Skin: No rashes or lesions  Psychiatric: No anxiety or depressed mood    Objective:     Vitals:    11/24/24 0738   BP: 124/63   Pulse: 70   Resp: 12   Temp: 98.6 °F (37 °C)   SpO2: 95%       General: No acute distress, conversant  Eyes: PERRLA, no scleral icterus  HENT: Normocephalic without oral lesions  Neck: Trachea midline without LAD  Cardiac: Normal pulse rate and rhythm  Pulmonary: Symmetric chest rise with normal effort  GI: Soft, resolved abdominal pain, ND, no hernia, no splenomegaly  Skin: Warm without rash  Extremities: No edema or joint stiffness  Psych: Appropriate mood and affect    Labs, vital signs, and I/O reviewed.    Assessment:     84-year-old female with a history of SBO secondary to total abdominal colectomy and ileoanal J-pouch for UC years ago with second SBO in 3 months with now return of gas    Plan:     Gastrografin challenge today.  KUB at 9:00 and 11:00 per patient's request.  Once were able to ensure contrast in her J-pouch we will consider clear liquid diet.    Parminder Salazar MD, FACS, [facility]  Bariatric and General Surgeon  [facility]    
Surgery Progress Note    11/25/2024    Admit Date: 11/22/2024  7:30 PM    CC: bowel function returned    Subjective:     Patient feels great. Had bowel function. Concerned about long term hydration options.    Constitutional: No fever or chills  Neurologic: No headache  Eyes: No scleral icterus or irritated eyes  Nose: No nasal pain or drainage  Mouth: No oral lesions or sore throat  Cardiac: No palpations or chest pain  Pulmonary: No cough or shortness of breath  Gastrointestinal: Resolved abdominal pain, no nausea, no emesis, diarrhea, or constipation  Genitourinary: No dysuria  Musculoskeletal: No muscle or joint tenderness  Skin: No rashes or lesions  Psychiatric: No anxiety or depressed mood    Objective:     Vitals:    11/25/24 0600   BP:    Pulse: 64   Resp:    Temp:    SpO2:        General: No acute distress, conversant  Eyes: PERRLA, no scleral icterus  HENT: Normocephalic without oral lesions  Neck: Trachea midline without LAD  Cardiac: Normal pulse rate and rhythm  Pulmonary: Symmetric chest rise with normal effort  GI: Soft, resolved abdominal pain, ND, no hernia, no splenomegaly  Skin: Warm without rash  Extremities: No edema or joint stiffness  Psych: Appropriate mood and affect    Labs, vital signs, and I/O reviewed.    Assessment:     84-year-old female with a history of SBO secondary to total abdominal colectomy and ileoanal J-pouch for UC years ago with second SBO in 3 months with now return of bowel function    Plan:     Gastrografin challenge successful. Reg diet. Cont hydration. Hopefully can DC tomorrow AM. Will work on long term plan for hydration as outpatient    Parminder Salazar MD, FACS, [facility]  Bariatric and General Surgeon  [facility]    
Surgery Progress Note    11/26/2024    Admit Date: 11/22/2024  7:30 PM    CC: bowel function returned    Subjective:     Some bloating yesterday after solid food.  Did better with liquids in the evening    Constitutional: No fever or chills  Neurologic: No headache  Eyes: No scleral icterus or irritated eyes  Nose: No nasal pain or drainage  Mouth: No oral lesions or sore throat  Cardiac: No palpations or chest pain  Pulmonary: No cough or shortness of breath  Gastrointestinal: Resolved abdominal pain, no nausea, no emesis, diarrhea, or constipation  Genitourinary: No dysuria  Musculoskeletal: No muscle or joint tenderness  Skin: No rashes or lesions  Psychiatric: No anxiety or depressed mood    Objective:     Vitals:    11/26/24 0814   BP: 131/69   Pulse: 59   Resp: 16   Temp: 97.9 °F (36.6 °C)   SpO2: 94%       General: No acute distress, conversant  Eyes: PERRLA, no scleral icterus  HENT: Normocephalic without oral lesions  Neck: Trachea midline without LAD  Cardiac: Normal pulse rate and rhythm  Pulmonary: Symmetric chest rise with normal effort  GI: Soft, resolved abdominal pain, ND, no hernia, no splenomegaly  Skin: Warm without rash  Extremities: No edema or joint stiffness  Psych: Appropriate mood and affect    Labs, vital signs, and I/O reviewed.    Assessment:     84-year-old female with a history of SBO secondary to total abdominal colectomy and ileoanal J-pouch for UC years ago with second SBO in 3 months with now return of bowel function    Plan:     Regular diet is fine.  Self regulate.  I want a good day of confidence building and may be home tomorrow.    Parminder Salazar MD, FACS, [facility]  Bariatric and General Surgeon  [facility]    
Surgery Progress Note    11/27/2024    Admit Date: 11/22/2024  7:30 PM    CC: bowel function returned    Subjective:     Tolerating her diet.  No pain.  Passing stool      Constitutional: No fever or chills  Neurologic: No headache  Eyes: No scleral icterus or irritated eyes  Nose: No nasal pain or drainage  Mouth: No oral lesions or sore throat  Cardiac: No palpations or chest pain  Pulmonary: No cough or shortness of breath  Gastrointestinal: Resolved abdominal pain, no nausea, no emesis, diarrhea, or constipation  Genitourinary: No dysuria  Musculoskeletal: No muscle or joint tenderness  Skin: No rashes or lesions  Psychiatric: No anxiety or depressed mood    Objective:     Vitals:    11/27/24 0748   BP: 132/68   Pulse: 54   Resp: 17   Temp: 98.1 °F (36.7 °C)   SpO2: 94%       General: No acute distress, conversant  Eyes: PERRLA, no scleral icterus  HENT: Normocephalic without oral lesions  Neck: Trachea midline without LAD  Cardiac: Normal pulse rate and rhythm  Pulmonary: Symmetric chest rise with normal effort  GI: Soft, resolved abdominal pain, ND, no hernia, no splenomegaly  Skin: Warm without rash  Extremities: No edema or joint stiffness  Psych: Appropriate mood and affect    Labs, vital signs, and I/O reviewed.    Assessment:     84-year-old female with a history of SBO secondary to total abdominal colectomy and ileoanal J-pouch for UC years ago with second SBO in 3 months with now return of bowel function    Plan:     Regular diet  Discharge home today per primary team.  Will see her back in about 10 days to work on arranging either outpatient or ER infusions for hydration    Parminder Salazar MD, FACS, [facility]  Bariatric and General Surgeon  [facility]    
This RN messaged on call provider regarding pt request for additional medication to help with nausea. Pt has not vomited. On call provider responded. Medication ordered. Educated patient on need for NGT tube to be placed if vomiting starts. Patient verbalized understanding of education.  
  CVS: S1 S2 heard, Capillary refill less than 2 seconds  Abd: soft NT ND bs absent   Ext: no clubbing, no cyanosis, no edema, brisk 2+ DP pulses  Neuro/Psych: grossly non-focal  Skin: warm     Data Review:    Review and/or order of clinical lab test  Review and/or order of tests in the radiology section of CPT  Review and/or order of tests in the medicine section of CPT      I have personally and independently reviewed all pertinent labs, diagnostic studies, imaging, and have provided independent interpretation of the same.     Labs:     Recent Labs     11/22/24 1755 11/23/24 0448   WBC 7.8 8.9   HGB 14.0 12.8   HCT 41.3 38.4    160     Recent Labs     11/22/24 1755 11/23/24 0448    140   K 4.0 3.9   CL 96* 106   CO2 33* 24   BUN 18 15   MG 2.0  --    PHOS 4.2  --      Recent Labs     11/22/24 1755   ALT 28   GLOB 4.0     No results for input(s): \"INR\", \"APTT\" in the last 72 hours.    Invalid input(s): \"PTP\"   No results for input(s): \"TIBC\" in the last 72 hours.    Invalid input(s): \"FE\", \"PSAT\", \"FERR\"   No results found for: \"RBCF\"   No results for input(s): \"PH\", \"PCO2\", \"PO2\" in the last 72 hours.  No results for input(s): \"CPK\" in the last 72 hours.    Invalid input(s): \"CPKMB\", \"CKNDX\", \"TROIQ\"  No results found for: \"CHOL\", \"CHLST\", \"CHOLV\", \"HDL\", \"HDLC\", \"LDL\", \"LDLC\"  No results found for: \"GLUCPOC\"  [unfilled]    Notes reviewed from all clinical/nonclinical/nursing services involved in patient's clinical care. Care coordination discussions were held with appropriate clinical/nonclinical/ nursing providers based on care coordination needs.         Patients current active Medications were reviewed, considered, added and adjusted based on the clinical condition today.      Home Medications were reconciled to the best of my ability given all available resources at the time of admission. Route is PO if not otherwise noted.      Admission Status:[id number]:::1}      Medications Reviewed: 
  CVS: S1 S2 heard, Capillary refill less than 2 seconds  Abd: soft NT ND bs+  Ext: no clubbing, no cyanosis, no edema, brisk 2+ DP pulses  Neuro/Psych: grossly non-focal  Skin: warm     Data Review:    Review and/or order of clinical lab test  Review and/or order of tests in the radiology section of CPT  Review and/or order of tests in the medicine section of CPT      I have personally and independently reviewed all pertinent labs, diagnostic studies, imaging, and have provided independent interpretation of the same.     Labs:     Recent Labs     11/23/24 0448 11/24/24  0506   WBC 8.9 5.7   HGB 12.8 11.2*   HCT 38.4 35.2    142*     Recent Labs     11/22/24  1755 11/23/24 0448 11/24/24  0506    140 142   K 4.0 3.9 3.6   CL 96* 106 110*   CO2 33* 24 20*   BUN 18 15 11   MG 2.0  --  1.7   PHOS 4.2  --   --      Recent Labs     11/22/24 1755   ALT 28   GLOB 4.0     No results for input(s): \"INR\", \"APTT\" in the last 72 hours.    Invalid input(s): \"PTP\"   No results for input(s): \"TIBC\" in the last 72 hours.    Invalid input(s): \"FE\", \"PSAT\", \"FERR\"   No results found for: \"RBCF\"   No results for input(s): \"PH\", \"PCO2\", \"PO2\" in the last 72 hours.  No results for input(s): \"CPK\" in the last 72 hours.    Invalid input(s): \"CPKMB\", \"CKNDX\", \"TROIQ\"  No results found for: \"CHOL\", \"CHLST\", \"CHOLV\", \"HDL\", \"HDLC\", \"LDL\", \"LDLC\"  No results found for: \"GLUCPOC\"  [unfilled]    Notes reviewed from all clinical/nonclinical/nursing services involved in patient's clinical care. Care coordination discussions were held with appropriate clinical/nonclinical/ nursing providers based on care coordination needs.         Patients current active Medications were reviewed, considered, added and adjusted based on the clinical condition today.      Home Medications were reconciled to the best of my ability given all available resources at the time of admission. Route is PO if not otherwise noted.      Admission 
Facility-Administered Medications   Medication Dose Route Frequency    0.9 % sodium chloride infusion   IntraVENous Continuous    LORazepam (ATIVAN) injection 0.5 mg  0.5 mg IntraVENous BID PRN    ondansetron (ZOFRAN-ODT) disintegrating tablet 4 mg  4 mg Oral Q4H PRN    Or    ondansetron (ZOFRAN) injection 4 mg  4 mg IntraVENous Q6H PRN    sodium chloride flush 0.9 % injection 5-40 mL  5-40 mL IntraVENous 2 times per day    sodium chloride flush 0.9 % injection 5-40 mL  5-40 mL IntraVENous PRN    0.9 % sodium chloride infusion   IntraVENous PRN    heparin (porcine) injection 5,000 Units  5,000 Units SubCUTAneous BID    polyethylene glycol (GLYCOLAX) packet 17 g  17 g Oral Daily PRN    acetaminophen (TYLENOL) tablet 650 mg  650 mg Oral Q6H PRN    Or    acetaminophen (TYLENOL) suppository 650 mg  650 mg Rectal Q6H PRN    morphine (PF) injection 1 mg  1 mg IntraVENous Q3H PRN    naloxone (NARCAN) injection 0.4 mg  0.4 mg IntraVENous PRN     ______________________________________________________________________  EXPECTED LENGTH OF STAY: 4  ACTUAL LENGTH OF STAY:          3                 Viktor Siddiqui MD     
comorbidities that affect functional and  no verbal  or physical assist needed to complete eval tasks   Based on the above components, the patient evaluation is determined to be of the following complexity level: Low

## 2024-12-11 ENCOUNTER — OFFICE VISIT (OUTPATIENT)
Age: 84
End: 2024-12-11
Payer: COMMERCIAL

## 2024-12-11 VITALS
WEIGHT: 82.6 LBS | BODY MASS INDEX: 15.6 KG/M2 | OXYGEN SATURATION: 97 % | SYSTOLIC BLOOD PRESSURE: 103 MMHG | DIASTOLIC BLOOD PRESSURE: 70 MMHG | HEART RATE: 65 BPM | HEIGHT: 61 IN | TEMPERATURE: 97.5 F | RESPIRATION RATE: 12 BRPM

## 2024-12-11 DIAGNOSIS — K90.9 DIARRHEA DUE TO MALABSORPTION: ICD-10-CM

## 2024-12-11 DIAGNOSIS — R63.6 UNDERWEIGHT (BMI < 18.5): ICD-10-CM

## 2024-12-11 DIAGNOSIS — R19.7 DIARRHEA DUE TO MALABSORPTION: ICD-10-CM

## 2024-12-11 DIAGNOSIS — K60.2 ANAL FISSURE: Primary | ICD-10-CM

## 2024-12-11 PROCEDURE — 99214 OFFICE O/P EST MOD 30 MIN: CPT | Performed by: SURGERY

## 2024-12-11 PROCEDURE — 1123F ACP DISCUSS/DSCN MKR DOCD: CPT | Performed by: SURGERY

## 2024-12-11 ASSESSMENT — PATIENT HEALTH QUESTIONNAIRE - PHQ9
SUM OF ALL RESPONSES TO PHQ9 QUESTIONS 1 & 2: 0
1. LITTLE INTEREST OR PLEASURE IN DOING THINGS: NOT AT ALL
2. FEELING DOWN, DEPRESSED OR HOPELESS: NOT AT ALL
SUM OF ALL RESPONSES TO PHQ QUESTIONS 1-9: 0

## 2024-12-11 NOTE — PROGRESS NOTES
Identified patient with two patient identifiers (name and ). Reviewed chart in preparation for visit and have obtained necessary documentation.    Emily Banks is a 84 y.o. female  Chief Complaint   Patient presents with    New Patient     Internal Pouch     /70 (Site: Left Upper Arm, Position: Sitting, Cuff Size: Medium Adult)   Pulse 65   Temp 97.5 °F (36.4 °C) (Oral)   Resp 12   Ht 1.55 m (5' 1.02\")   Wt 37.5 kg (82 lb 9.6 oz)   SpO2 97%   BMI 15.60 kg/m²     1. Have you been to the ER, urgent care clinic since your last visit?  Hospitalized since your last visit?yes - [facility] 2024 - 2024 FOR Volume depletion     2. Have you seen or consulted any other health care providers outside of the [facility] since your last visit?  Include any pap smears or colon screening. no  
fluid shortages.  Patient will plan to go to the ER in the next few days for 2 L of IV fluid.  No further workup needed for this.  Encouraged her to strive for 64 ounces of liquid intake a day.    Malnutrition with a BMI of 15.6.  Needs to increase protein intake.    I will refer her to Dr. HUMPHREYS for her fissures and hemorrhoids and possible pouchoscopy if indicated.    I will see her back in 2 weeks to make sure she is getting the appropriate care.      Parminder Salazar MD, FACS, [facility]  Bariatric and General Surgeon  [facility]

## 2024-12-13 ENCOUNTER — HOSPITAL ENCOUNTER (EMERGENCY)
Facility: HOSPITAL | Age: 84
Discharge: HOME | End: 2024-12-13
Attending: STUDENT IN AN ORGANIZED HEALTH CARE EDUCATION/TRAINING PROGRAM
Payer: COMMERCIAL

## 2024-12-13 VITALS
OXYGEN SATURATION: 96 % | BODY MASS INDEX: 15.69 KG/M2 | WEIGHT: 83.11 LBS | SYSTOLIC BLOOD PRESSURE: 120 MMHG | TEMPERATURE: 97.5 F | RESPIRATION RATE: 15 BRPM | HEART RATE: 77 BPM | DIASTOLIC BLOOD PRESSURE: 61 MMHG | HEIGHT: 61 IN

## 2024-12-13 DIAGNOSIS — E86.0 DEHYDRATION: Primary | ICD-10-CM

## 2024-12-13 LAB
ALBUMIN SERPL-MCNC: 3.6 G/DL (ref 3.5–5)
ALBUMIN/GLOB SERPL: 1 (ref 1.1–2.2)
ALP SERPL-CCNC: 40 U/L (ref 45–117)
ALT SERPL-CCNC: 18 U/L (ref 12–78)
ANION GAP SERPL CALC-SCNC: 3 MMOL/L (ref 2–12)
AST SERPL-CCNC: 21 U/L (ref 15–37)
BASOPHILS # BLD: 0 K/UL (ref 0–0.1)
BASOPHILS NFR BLD: 1 % (ref 0–1)
BILIRUB SERPL-MCNC: 0.3 MG/DL (ref 0.2–1)
BUN SERPL-MCNC: 17 MG/DL (ref 6–20)
BUN/CREAT SERPL: 16 (ref 12–20)
CALCIUM SERPL-MCNC: 10 MG/DL (ref 8.5–10.1)
CHLORIDE SERPL-SCNC: 107 MMOL/L (ref 97–108)
CO2 SERPL-SCNC: 29 MMOL/L (ref 21–32)
CREAT SERPL-MCNC: 1.07 MG/DL (ref 0.55–1.02)
DIFFERENTIAL METHOD BLD: NORMAL
EOSINOPHIL # BLD: 0.2 K/UL (ref 0–0.4)
EOSINOPHIL NFR BLD: 4 % (ref 0–7)
ERYTHROCYTE [DISTWIDTH] IN BLOOD BY AUTOMATED COUNT: 13.4 % (ref 11.5–14.5)
GLOBULIN SER CALC-MCNC: 3.6 G/DL (ref 2–4)
GLUCOSE SERPL-MCNC: 77 MG/DL (ref 65–100)
HCT VFR BLD AUTO: 36.4 % (ref 35–47)
HGB BLD-MCNC: 11.5 G/DL (ref 11.5–16)
IMM GRANULOCYTES # BLD AUTO: 0 K/UL (ref 0–0.04)
IMM GRANULOCYTES NFR BLD AUTO: 0 % (ref 0–0.5)
LYMPHOCYTES # BLD: 1.2 K/UL (ref 0.8–3.5)
LYMPHOCYTES NFR BLD: 26 % (ref 12–49)
MCH RBC QN AUTO: 30.2 PG (ref 26–34)
MCHC RBC AUTO-ENTMCNC: 31.6 G/DL (ref 30–36.5)
MCV RBC AUTO: 95.5 FL (ref 80–99)
MONOCYTES # BLD: 0.5 K/UL (ref 0–1)
MONOCYTES NFR BLD: 11 % (ref 5–13)
NEUTS SEG # BLD: 2.6 K/UL (ref 1.8–8)
NEUTS SEG NFR BLD: 58 % (ref 32–75)
NRBC # BLD: 0 K/UL (ref 0–0.01)
NRBC BLD-RTO: 0 PER 100 WBC
PLATELET # BLD AUTO: 199 K/UL (ref 150–400)
PMV BLD AUTO: 12.1 FL (ref 8.9–12.9)
POTASSIUM SERPL-SCNC: 4.2 MMOL/L (ref 3.5–5.1)
PROT SERPL-MCNC: 7.2 G/DL (ref 6.4–8.2)
RBC # BLD AUTO: 3.81 M/UL (ref 3.8–5.2)
SODIUM SERPL-SCNC: 139 MMOL/L (ref 136–145)
WBC # BLD AUTO: 4.5 K/UL (ref 3.6–11)

## 2024-12-13 PROCEDURE — 80053 COMPREHEN METABOLIC PANEL: CPT

## 2024-12-13 PROCEDURE — 2580000003 HC RX 258: Performed by: STUDENT IN AN ORGANIZED HEALTH CARE EDUCATION/TRAINING PROGRAM

## 2024-12-13 PROCEDURE — 85025 COMPLETE CBC W/AUTO DIFF WBC: CPT

## 2024-12-13 PROCEDURE — 99284 EMERGENCY DEPT VISIT MOD MDM: CPT

## 2024-12-13 PROCEDURE — 96361 HYDRATE IV INFUSION ADD-ON: CPT

## 2024-12-13 PROCEDURE — 36415 COLL VENOUS BLD VENIPUNCTURE: CPT

## 2024-12-13 PROCEDURE — 96360 HYDRATION IV INFUSION INIT: CPT

## 2024-12-13 RX ORDER — 0.9 % SODIUM CHLORIDE 0.9 %
1000 INTRAVENOUS SOLUTION INTRAVENOUS ONCE
Status: COMPLETED | OUTPATIENT
Start: 2024-12-13 | End: 2024-12-13

## 2024-12-13 RX ADMIN — SODIUM CHLORIDE 1000 ML: 9 INJECTION, SOLUTION INTRAVENOUS at 08:21

## 2024-12-13 ASSESSMENT — LIFESTYLE VARIABLES
HOW OFTEN DO YOU HAVE A DRINK CONTAINING ALCOHOL: NEVER
HOW MANY STANDARD DRINKS CONTAINING ALCOHOL DO YOU HAVE ON A TYPICAL DAY: PATIENT DOES NOT DRINK

## 2024-12-13 ASSESSMENT — PAIN - FUNCTIONAL ASSESSMENT: PAIN_FUNCTIONAL_ASSESSMENT: 0-10

## 2024-12-13 ASSESSMENT — PAIN SCALES - GENERAL: PAINLEVEL_OUTOF10: 0

## 2024-12-13 NOTE — ED PROVIDER NOTES
pertinent family history.     SOCIAL HISTORY       Social History     Socioeconomic History    Marital status:      Spouse name: None    Number of children: None    Years of education: None    Highest education level: None   Tobacco Use    Smoking status: Never    Smokeless tobacco: Never   Substance and Sexual Activity    Alcohol use: Not Currently    Drug use: Not Currently     Social Determinants of Health      Received from [facility] [employer]    Financial Resource Strain   Food Insecurity: No Food Insecurity (8/28/2024)    Hunger Vital Sign     Worried About Running Out of Food in the Last Year: Never true     Ran Out of Food in the Last Year: Never true   Transportation Needs: No Transportation Needs (8/28/2024)    PRAPARE - Transportation     Lack of Transportation (Medical): No     Lack of Transportation (Non-Medical): No    Received from [facility]    Intimate Partner Violence   Housing Stability: Low Risk  (8/28/2024)    Housing Stability Vital Sign     Unable to Pay for Housing in the Last Year: No     Number of Times Moved in the Last Year: 0     Homeless in the Last Year: No       PHYSICAL EXAM       ED Triage Vitals [12/13/24 0733]   BP Systolic BP Percentile Diastolic BP Percentile Temp Temp Source Pulse Respirations SpO2   120/61 -- -- 97.5 °F (36.4 °C) Oral 77 15 96 %      Height Weight - Scale         1.549 m (5' 1\") 37.7 kg (83 lb 1.8 oz)           Physical Exam  Constitutional:       Appearance: Normal appearance.   HENT:      Head: Normocephalic and atraumatic.      Nose: Nose normal.      Mouth/Throat:      Mouth: Mucous membranes are moist.   Eyes:      General: No scleral icterus.     Extraocular Movements: Extraocular movements intact.   Cardiovascular:      Rate and Rhythm: Normal rate.      Pulses: Normal pulses.   Pulmonary:      Effort: Pulmonary effort is normal.      Breath sounds: Normal breath sounds.   Abdominal:      General: Abdomen is flat.      Comments: Soft        CONSULTS:  None    PROCEDURES:  Procedures    FINAL IMPRESSION      1. Dehydration          DISPOSITION/PLAN   DISPOSITION Decision To Discharge 12/13/2024 11:52:40 AM    (Please note that portions of this note were completed with a voice recognition program.  Efforts were made to edit the dictations but occasionally words are mis-transcribed.)    Joaquin Worthy MD (electronically signed)  Emergency Attending Physician      Joaquin Worthy MD  12/15/24 0112

## 2024-12-13 NOTE — ED TRIAGE NOTES
Pt sent here by Dr Salazar for IVFS since she is not receiving them @ the infusion cenetr.  Pt reports increased ouptut from her j-pouch recently.Denies abd pain, n/v

## 2024-12-13 NOTE — DISCHARGE INSTRUCTIONS
Your blood work in the ER was overall reassuring.  There is no evidence of severe dehydration or damage to your organs.  You were given IV fluids.  Return to the emergency department if you develop abdominal pain, distention, fevers or any other concerning symptoms.  Otherwise follow-up with your surgeon and outpatient doctors for further care soon as possible.

## 2024-12-27 ENCOUNTER — HOSPITAL ENCOUNTER (EMERGENCY)
Facility: HOSPITAL | Age: 84
Discharge: HOME | End: 2024-12-27
Attending: EMERGENCY MEDICINE
Payer: COMMERCIAL

## 2024-12-27 VITALS
DIASTOLIC BLOOD PRESSURE: 65 MMHG | HEART RATE: 57 BPM | RESPIRATION RATE: 18 BRPM | SYSTOLIC BLOOD PRESSURE: 113 MMHG | TEMPERATURE: 97.5 F | OXYGEN SATURATION: 97 %

## 2024-12-27 DIAGNOSIS — E86.0 DEHYDRATION: Primary | ICD-10-CM

## 2024-12-27 LAB
ALBUMIN SERPL-MCNC: 3.5 G/DL (ref 3.5–5)
ALBUMIN/GLOB SERPL: 1.1 (ref 1.1–2.2)
ALP SERPL-CCNC: 40 U/L (ref 45–117)
ALT SERPL-CCNC: 25 U/L (ref 12–78)
ANION GAP BLD CALC-SCNC: 12 (ref 10–20)
ANION GAP SERPL CALC-SCNC: 4 MMOL/L (ref 2–12)
AST SERPL-CCNC: 29 U/L (ref 15–37)
BASE EXCESS BLD CALC-SCNC: 0.3 MMOL/L
BASOPHILS # BLD: 0 K/UL (ref 0–0.1)
BASOPHILS NFR BLD: 1 % (ref 0–1)
BILIRUB SERPL-MCNC: 0.3 MG/DL (ref 0.2–1)
BUN SERPL-MCNC: 26 MG/DL (ref 6–20)
BUN/CREAT SERPL: 23 (ref 12–20)
CALCIUM SERPL-MCNC: 9.5 MG/DL (ref 8.5–10.1)
CHLORIDE BLD-SCNC: 106 MMOL/L (ref 100–111)
CHLORIDE SERPL-SCNC: 105 MMOL/L (ref 97–108)
CO2 BLD-SCNC: 25 MMOL/L (ref 22–29)
CO2 SERPL-SCNC: 32 MMOL/L (ref 21–32)
COMMENT:: NORMAL
CREAT SERPL-MCNC: 1.14 MG/DL (ref 0.55–1.02)
CREAT UR-MCNC: 0.9 MG/DL (ref 0.6–1.3)
DIFFERENTIAL METHOD BLD: NORMAL
EOSINOPHIL # BLD: 0.1 K/UL (ref 0–0.4)
EOSINOPHIL NFR BLD: 3 % (ref 0–7)
ERYTHROCYTE [DISTWIDTH] IN BLOOD BY AUTOMATED COUNT: 13.4 % (ref 11.5–14.5)
GLOBULIN SER CALC-MCNC: 3.2 G/DL (ref 2–4)
GLUCOSE BLD STRIP.AUTO-MCNC: 77 MG/DL (ref 74–99)
GLUCOSE SERPL-MCNC: 90 MG/DL (ref 65–100)
HCO3 BLDA-SCNC: 26 MMOL/L
HCT VFR BLD AUTO: 39.3 % (ref 35–47)
HGB BLD-MCNC: 12.8 G/DL (ref 11.5–16)
IMM GRANULOCYTES # BLD AUTO: 0 K/UL (ref 0–0.04)
IMM GRANULOCYTES NFR BLD AUTO: 0 % (ref 0–0.5)
LYMPHOCYTES # BLD: 1.5 K/UL (ref 0.8–3.5)
LYMPHOCYTES NFR BLD: 33 % (ref 12–49)
MAGNESIUM SERPL-MCNC: 2.3 MG/DL (ref 1.6–2.4)
MCH RBC QN AUTO: 30.9 PG (ref 26–34)
MCHC RBC AUTO-ENTMCNC: 32.6 G/DL (ref 30–36.5)
MCV RBC AUTO: 94.9 FL (ref 80–99)
MONOCYTES # BLD: 0.5 K/UL (ref 0–1)
MONOCYTES NFR BLD: 10 % (ref 5–13)
NEUTS SEG # BLD: 2.4 K/UL (ref 1.8–8)
NEUTS SEG NFR BLD: 53 % (ref 32–75)
NRBC # BLD: 0 K/UL (ref 0–0.01)
NRBC BLD-RTO: 0 PER 100 WBC
PCO2 BLDV: 44.2 MMHG (ref 41–51)
PH BLDV: 7.37 (ref 7.32–7.42)
PHOSPHATE SERPL-MCNC: 3.4 MG/DL (ref 2.6–4.7)
PLATELET # BLD AUTO: 184 K/UL (ref 150–400)
PMV BLD AUTO: 11.9 FL (ref 8.9–12.9)
PO2 BLDV: <27 MMHG (ref 25–40)
POTASSIUM BLD-SCNC: 4.3 MMOL/L (ref 3.5–5.5)
POTASSIUM SERPL-SCNC: 4.7 MMOL/L (ref 3.5–5.1)
PROT SERPL-MCNC: 6.7 G/DL (ref 6.4–8.2)
RBC # BLD AUTO: 4.14 M/UL (ref 3.8–5.2)
SODIUM BLD-SCNC: 143 MMOL/L (ref 136–145)
SODIUM SERPL-SCNC: 141 MMOL/L (ref 136–145)
SPECIMEN HOLD: NORMAL
SPECIMEN SITE: NORMAL
WBC # BLD AUTO: 4.6 K/UL (ref 3.6–11)

## 2024-12-27 PROCEDURE — 36415 COLL VENOUS BLD VENIPUNCTURE: CPT

## 2024-12-27 PROCEDURE — 96361 HYDRATE IV INFUSION ADD-ON: CPT

## 2024-12-27 PROCEDURE — 82330 ASSAY OF CALCIUM: CPT

## 2024-12-27 PROCEDURE — 2580000003 HC RX 258: Performed by: EMERGENCY MEDICINE

## 2024-12-27 PROCEDURE — 80053 COMPREHEN METABOLIC PANEL: CPT

## 2024-12-27 PROCEDURE — 84295 ASSAY OF SERUM SODIUM: CPT

## 2024-12-27 PROCEDURE — 84100 ASSAY OF PHOSPHORUS: CPT

## 2024-12-27 PROCEDURE — 85025 COMPLETE CBC W/AUTO DIFF WBC: CPT

## 2024-12-27 PROCEDURE — 84132 ASSAY OF SERUM POTASSIUM: CPT

## 2024-12-27 PROCEDURE — 82947 ASSAY GLUCOSE BLOOD QUANT: CPT

## 2024-12-27 PROCEDURE — 96360 HYDRATION IV INFUSION INIT: CPT

## 2024-12-27 PROCEDURE — 82803 BLOOD GASES ANY COMBINATION: CPT

## 2024-12-27 PROCEDURE — 83735 ASSAY OF MAGNESIUM: CPT

## 2024-12-27 PROCEDURE — 99284 EMERGENCY DEPT VISIT MOD MDM: CPT

## 2024-12-27 RX ORDER — 0.9 % SODIUM CHLORIDE 0.9 %
1000 INTRAVENOUS SOLUTION INTRAVENOUS ONCE
Status: COMPLETED | OUTPATIENT
Start: 2024-12-27 | End: 2024-12-27

## 2024-12-27 RX ORDER — SODIUM CHLORIDE, SODIUM LACTATE, POTASSIUM CHLORIDE, AND CALCIUM CHLORIDE .6; .31; .03; .02 G/100ML; G/100ML; G/100ML; G/100ML
1000 INJECTION, SOLUTION INTRAVENOUS ONCE
Status: COMPLETED | OUTPATIENT
Start: 2024-12-27 | End: 2024-12-27

## 2024-12-27 RX ADMIN — SODIUM CHLORIDE 1000 ML: 9 INJECTION, SOLUTION INTRAVENOUS at 10:05

## 2024-12-27 RX ADMIN — SODIUM CHLORIDE, POTASSIUM CHLORIDE, SODIUM LACTATE AND CALCIUM CHLORIDE 1000 ML: 600; 310; 30; 20 INJECTION, SOLUTION INTRAVENOUS at 11:51

## 2024-12-27 RX ADMIN — SODIUM CHLORIDE 1000 ML: 9 INJECTION, SOLUTION INTRAVENOUS at 08:00

## 2024-12-27 ASSESSMENT — PAIN SCALES - GENERAL
PAINLEVEL_OUTOF10: 0
PAINLEVEL_OUTOF10: 0

## 2024-12-27 ASSESSMENT — PAIN - FUNCTIONAL ASSESSMENT: PAIN_FUNCTIONAL_ASSESSMENT: 0-10

## 2024-12-27 NOTE — ED PROVIDER NOTES
[facility] EMERGENCY DEP  EMERGENCY DEPARTMENT ENCOUNTER      Pt Name: Emily Banks  MRN: [id number]  Birthdate 1940  Date of evaluation: 12/27/2024  Provider: Ami Hussein DO    CHIEF COMPLAINT       Chief Complaint   Patient presents with    Dehydration         HISTORY OF PRESENT ILLNESS   (Location/Symptom, Timing/Onset, Context/Setting, Quality, Duration, Modifying Factors, Severity)  Note limiting factors.   HPI      Review of External Medical Records:     Nursing Notes were reviewed.    REVIEW OF SYSTEMS    (2-9 systems for level 4, 10 or more for level 5)     Review of Systems    Except as noted above the remainder of the review of systems was reviewed and negative.       PAST MEDICAL HISTORY   No past medical history on file.      SURGICAL HISTORY       Past Surgical History:   Procedure Laterality Date    COLECTOMY      JOINT REPLACEMENT      2019         CURRENT MEDICATIONS       Previous Medications    CHOLECALCIFEROL 50 MCG (2000 UT) CAPS    Take by mouth daily    DENOSUMAB (PROLIA) 60 MG/ML SOSY SC INJECTION    Inject 60 mg by subcutaneous route.    ERGOCALCIFEROL (ERGOCALCIFEROL) 1.25 MG (47091 UT) CAPSULE    Take 3,000 Units by mouth    ESCITALOPRAM (LEXAPRO) 20 MG TABLET        LORAZEPAM (ATIVAN) 0.5 MG TABLET    TAKE 0.5 TABLET ORALLY AT BEDTIME AS NEEDED FOR SLEEP    MULTIPLE VITAMIN (MULTI VITAMIN) TABS    Take 1 tablet by mouth Every Day    SIMVASTATIN (ZOCOR) 10 MG TABLET    simvastatin 10 mg tablet    SYNTHROID 25 MCG TABLET           ALLERGIES     Compazine [prochlorperazine]    FAMILY HISTORY     No family history on file.       SOCIAL HISTORY       Social History     Socioeconomic History    Marital status:    Tobacco Use    Smoking status: Never    Smokeless tobacco: Never   Substance and Sexual Activity    Alcohol use: Not Currently    Drug use: Not Currently     Social Determinants of Health      Received from [employer]    Financial Resource Strain   Food    Procedures    Patient states she feels much better, creatinine is improved, patient agrees with d/c home  FINAL IMPRESSION      1. Dehydration          DISPOSITION/PLAN   DISPOSITION Decision To Discharge 12/27/2024 12:18:16 PM      PATIENT REFERRED TO:  Carlie Norton MD  [address] [address] [phone]          [facility] EMERGENCY DEP  [address] [place] [address]  [phone]    If symptoms worsen      DISCHARGE MEDICATIONS:  New Prescriptions    No medications on file         (Please note that portions of this note were completed with a voice recognition program.  Efforts were made to edit the dictations but occasionally words are mis-transcribed.)    Ami Hussein DO (electronically signed)  Emergency Attending Physician / Physician Assistant / Nurse Practitioner             Ami Hussein DO  12/27/24 2679

## 2024-12-27 NOTE — ED TRIAGE NOTES
Pt ambulatory to triage co dehydration. Pt has been seen here for same complaint in the past. States that she is here for IV hydration. Denies any other symptoms at this time

## 2024-12-27 NOTE — ED NOTES
Reviewed discharge instructions with patient. All questions answered. IV removed.     Patient-Reported Vitals  Vitals:    12/27/24 1000 12/27/24 1100 12/27/24 1200 12/27/24 1300   BP: 112/63 123/62 123/67 113/65   Pulse:       Resp:       Temp:       TempSrc:       SpO2: 99% 99% 97% 97%

## 2024-12-30 ENCOUNTER — OFFICE VISIT (OUTPATIENT)
Age: 84
End: 2024-12-30
Payer: COMMERCIAL

## 2024-12-30 VITALS
SYSTOLIC BLOOD PRESSURE: 109 MMHG | BODY MASS INDEX: 15.93 KG/M2 | HEIGHT: 61 IN | HEART RATE: 55 BPM | WEIGHT: 84.4 LBS | RESPIRATION RATE: 14 BRPM | DIASTOLIC BLOOD PRESSURE: 73 MMHG | OXYGEN SATURATION: 97 % | TEMPERATURE: 98.3 F

## 2024-12-30 DIAGNOSIS — Z90.49 H/O COLECTOMY: Primary | ICD-10-CM

## 2024-12-30 DIAGNOSIS — E86.0 DEHYDRATION: ICD-10-CM

## 2024-12-30 PROCEDURE — 99214 OFFICE O/P EST MOD 30 MIN: CPT | Performed by: SURGERY

## 2024-12-30 PROCEDURE — 1123F ACP DISCUSS/DSCN MKR DOCD: CPT | Performed by: SURGERY

## 2024-12-30 ASSESSMENT — PATIENT HEALTH QUESTIONNAIRE - PHQ9
SUM OF ALL RESPONSES TO PHQ QUESTIONS 1-9: 0
2. FEELING DOWN, DEPRESSED OR HOPELESS: NOT AT ALL
SUM OF ALL RESPONSES TO PHQ QUESTIONS 1-9: 0
1. LITTLE INTEREST OR PLEASURE IN DOING THINGS: NOT AT ALL
SUM OF ALL RESPONSES TO PHQ9 QUESTIONS 1 & 2: 0

## 2024-12-30 NOTE — PROGRESS NOTES
Identified patient with two patient identifiers (name and ). Reviewed chart in preparation for visit and have obtained necessary documentation.    Emily Banks is a 84 y.o. female  Chief Complaint   Patient presents with    Follow-up     /73 (Site: Left Upper Arm, Position: Sitting, Cuff Size: Medium Adult)   Pulse 55   Temp 98.3 °F (36.8 °C) (Oral)   Resp 14   Ht 1.549 m (5' 1\")   Wt 38.3 kg (84 lb 6.4 oz)   SpO2 97%   BMI 15.95 kg/m²     1. Have you been to the ER, urgent care clinic since your last visit?  Hospitalized since your last visit?yes -  and  for dehydration at [facility] ER    2. Have you seen or consulted any other health care providers outside of the [facility] since your last visit?  Include any pap smears or colon screening. No    Patients heart rate is 55 below normal range.  2nd recheck shows patient is 55 still not within normal range. Provider made aware.

## 2024-12-30 NOTE — PROGRESS NOTES
Surgery Progress Note    12/30/2024    CC: Dehydration    Subjective:     Since the last visit she has been to the ER twice for hydration.  She received 1 L followed by 3 L a different day.  Feels better with these.  Thinks that 3 L was a little bit too much.  Has not received the referral to colorectal yet.  Saw her GI specialist.  Now on a probiotic.  Also trying antacid as well.    Constitutional: No fever or chills  Neurologic: No headache  Eyes: No scleral icterus or irritated eyes  Nose: No nasal pain or drainage  Mouth: No oral lesions or sore throat  Cardiac: No palpations or chest pain  Pulmonary: No cough or shortness of breath  Gastrointestinal: Diarrhea, no nausea, emesis, or constipation  Genitourinary: No dysuria  Musculoskeletal: No muscle or joint tenderness  Skin: No rashes or lesions  Psychiatric: No anxiety or depressed mood    Objective:     Vitals:    12/30/24 0857   BP:    Pulse: 55   Resp:    Temp:    SpO2:      Body mass index is 15.95 kg/m².  Wt 84 lbs    General: No acute distress, conversant  Eyes: PERRLA, no scleral icterus  HENT: Normocephalic without oral lesions  Neck: Trachea midline without LAD  Cardiac: Normal pulse rate and rhythm  Pulmonary: Symmetric chest rise with normal effort  GI: Soft, NT, ND, no hernia, no splenomegaly  Skin: Warm without rash  Extremities: No edema or joint stiffness  Psych: Appropriate mood and affect    Assessment:     84-year-old female with worsening diarrhea over the years following total abdominal colectomy with ileoanal J-pouch for history of UC    Plan:     Continue intermittent IV hydration through the ER for now.  Outpatient infusion center not yet available secondary to IV fluid shortage.  Patient does not want to consider a port at this time.  We will try to coordinate that referral to Dr. HUMPHREYS for second opinion on her diarrhea as well as for her hemorrhoids.  Continue following up with her GI specialist.  Should we consider some Lomotil?

## 2025-01-12 ENCOUNTER — HOSPITAL ENCOUNTER (EMERGENCY)
Facility: HOSPITAL | Age: 85
Discharge: HOME | End: 2025-01-12
Attending: EMERGENCY MEDICINE
Payer: COMMERCIAL

## 2025-01-12 VITALS
RESPIRATION RATE: 16 BRPM | DIASTOLIC BLOOD PRESSURE: 63 MMHG | WEIGHT: 82.89 LBS | HEART RATE: 56 BPM | BODY MASS INDEX: 15.66 KG/M2 | TEMPERATURE: 97.8 F | OXYGEN SATURATION: 96 % | SYSTOLIC BLOOD PRESSURE: 95 MMHG

## 2025-01-12 DIAGNOSIS — E86.0 DEHYDRATION: Primary | ICD-10-CM

## 2025-01-12 LAB
ANION GAP SERPL CALC-SCNC: 2 MMOL/L (ref 2–12)
BASOPHILS # BLD: 0.04 K/UL (ref 0–0.1)
BASOPHILS NFR BLD: 0.9 % (ref 0–1)
BUN SERPL-MCNC: 19 MG/DL (ref 6–20)
BUN/CREAT SERPL: 14 (ref 12–20)
CALCIUM SERPL-MCNC: 9.9 MG/DL (ref 8.5–10.1)
CHLORIDE SERPL-SCNC: 102 MMOL/L (ref 97–108)
CO2 SERPL-SCNC: 30 MMOL/L (ref 21–32)
COMMENT:: NORMAL
CREAT SERPL-MCNC: 1.37 MG/DL (ref 0.55–1.02)
DIFFERENTIAL METHOD BLD: NORMAL
EOSINOPHIL # BLD: 0.08 K/UL (ref 0–0.4)
EOSINOPHIL NFR BLD: 1.8 % (ref 0–7)
ERYTHROCYTE [DISTWIDTH] IN BLOOD BY AUTOMATED COUNT: 13.4 % (ref 11.5–14.5)
GLUCOSE SERPL-MCNC: 106 MG/DL (ref 65–100)
HCT VFR BLD AUTO: 38.4 % (ref 35–47)
HGB BLD-MCNC: 12.6 G/DL (ref 11.5–16)
IMM GRANULOCYTES # BLD AUTO: 0.01 K/UL (ref 0–0.04)
IMM GRANULOCYTES NFR BLD AUTO: 0.2 % (ref 0–0.5)
LYMPHOCYTES # BLD: 1.5 K/UL (ref 0.8–3.5)
LYMPHOCYTES NFR BLD: 33.5 % (ref 12–49)
MAGNESIUM SERPL-MCNC: 2.2 MG/DL (ref 1.6–2.4)
MCH RBC QN AUTO: 31.1 PG (ref 26–34)
MCHC RBC AUTO-ENTMCNC: 32.8 G/DL (ref 30–36.5)
MCV RBC AUTO: 94.8 FL (ref 80–99)
MONOCYTES # BLD: 0.54 K/UL (ref 0–1)
MONOCYTES NFR BLD: 12.1 % (ref 5–13)
NEUTS SEG # BLD: 2.31 K/UL (ref 1.8–8)
NEUTS SEG NFR BLD: 51.5 % (ref 32–75)
NRBC # BLD: 0 K/UL (ref 0–0.01)
NRBC BLD-RTO: 0 PER 100 WBC
PLATELET # BLD AUTO: 186 K/UL (ref 150–400)
PMV BLD AUTO: 12 FL (ref 8.9–12.9)
POTASSIUM SERPL-SCNC: 4.2 MMOL/L (ref 3.5–5.1)
RBC # BLD AUTO: 4.05 M/UL (ref 3.8–5.2)
SODIUM SERPL-SCNC: 134 MMOL/L (ref 136–145)
SPECIMEN HOLD: NORMAL
WBC # BLD AUTO: 4.5 K/UL (ref 3.6–11)

## 2025-01-12 PROCEDURE — 2580000003 HC RX 258: Performed by: STUDENT IN AN ORGANIZED HEALTH CARE EDUCATION/TRAINING PROGRAM

## 2025-01-12 PROCEDURE — 85025 COMPLETE CBC W/AUTO DIFF WBC: CPT

## 2025-01-12 PROCEDURE — 2580000003 HC RX 258: Performed by: EMERGENCY MEDICINE

## 2025-01-12 PROCEDURE — 83735 ASSAY OF MAGNESIUM: CPT

## 2025-01-12 PROCEDURE — 96361 HYDRATE IV INFUSION ADD-ON: CPT

## 2025-01-12 PROCEDURE — 96360 HYDRATION IV INFUSION INIT: CPT

## 2025-01-12 PROCEDURE — 80048 BASIC METABOLIC PNL TOTAL CA: CPT

## 2025-01-12 PROCEDURE — 99284 EMERGENCY DEPT VISIT MOD MDM: CPT

## 2025-01-12 PROCEDURE — 36415 COLL VENOUS BLD VENIPUNCTURE: CPT

## 2025-01-12 RX ORDER — 0.9 % SODIUM CHLORIDE 0.9 %
1000 INTRAVENOUS SOLUTION INTRAVENOUS ONCE
Status: COMPLETED | OUTPATIENT
Start: 2025-01-12 | End: 2025-01-12

## 2025-01-12 RX ORDER — 0.9 % SODIUM CHLORIDE 0.9 %
500 INTRAVENOUS SOLUTION INTRAVENOUS ONCE
Status: DISCONTINUED | OUTPATIENT
Start: 2025-01-12 | End: 2025-01-12

## 2025-01-12 RX ADMIN — SODIUM CHLORIDE 1000 ML: 9 INJECTION, SOLUTION INTRAVENOUS at 10:07

## 2025-01-12 RX ADMIN — SODIUM CHLORIDE 1000 ML: 9 INJECTION, SOLUTION INTRAVENOUS at 08:21

## 2025-01-12 NOTE — ED TRIAGE NOTES
Pt presents to ED with c/o dehydration. Pt does not have colon and receives rehydration therapy here.
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
## 2025-01-22 ENCOUNTER — TELEPHONE (OUTPATIENT)
Age: 85
End: 2025-01-22

## 2025-01-24 ENCOUNTER — HOSPITAL ENCOUNTER (EMERGENCY)
Facility: HOSPITAL | Age: 85
Discharge: HOME | End: 2025-01-24
Attending: STUDENT IN AN ORGANIZED HEALTH CARE EDUCATION/TRAINING PROGRAM
Payer: COMMERCIAL

## 2025-01-24 VITALS
BODY MASS INDEX: 15.5 KG/M2 | DIASTOLIC BLOOD PRESSURE: 65 MMHG | WEIGHT: 82.01 LBS | TEMPERATURE: 97.5 F | HEART RATE: 59 BPM | RESPIRATION RATE: 14 BRPM | SYSTOLIC BLOOD PRESSURE: 120 MMHG | OXYGEN SATURATION: 100 %

## 2025-01-24 DIAGNOSIS — E86.0 DEHYDRATION: Primary | ICD-10-CM

## 2025-01-24 LAB
ANION GAP SERPL CALC-SCNC: 5 MMOL/L (ref 2–12)
BASOPHILS # BLD: 0.04 K/UL (ref 0–0.1)
BASOPHILS NFR BLD: 1 % (ref 0–1)
BUN SERPL-MCNC: 28 MG/DL (ref 6–20)
BUN/CREAT SERPL: 21 (ref 12–20)
CALCIUM SERPL-MCNC: 9.7 MG/DL (ref 8.5–10.1)
CHLORIDE SERPL-SCNC: 107 MMOL/L (ref 97–108)
CO2 SERPL-SCNC: 24 MMOL/L (ref 21–32)
CREAT SERPL-MCNC: 1.36 MG/DL (ref 0.55–1.02)
DIFFERENTIAL METHOD BLD: ABNORMAL
EOSINOPHIL # BLD: 0.08 K/UL (ref 0–0.4)
EOSINOPHIL NFR BLD: 1.9 % (ref 0–7)
ERYTHROCYTE [DISTWIDTH] IN BLOOD BY AUTOMATED COUNT: 13.3 % (ref 11.5–14.5)
GLUCOSE SERPL-MCNC: 81 MG/DL (ref 65–100)
HCT VFR BLD AUTO: 38.9 % (ref 35–47)
HGB BLD-MCNC: 12.5 G/DL (ref 11.5–16)
IMM GRANULOCYTES # BLD AUTO: 0.01 K/UL (ref 0–0.04)
IMM GRANULOCYTES NFR BLD AUTO: 0.2 % (ref 0–0.5)
LYMPHOCYTES # BLD: 1.52 K/UL (ref 0.8–3.5)
LYMPHOCYTES NFR BLD: 36.9 % (ref 12–49)
MCH RBC QN AUTO: 30.2 PG (ref 26–34)
MCHC RBC AUTO-ENTMCNC: 32.1 G/DL (ref 30–36.5)
MCV RBC AUTO: 94 FL (ref 80–99)
MONOCYTES # BLD: 0.54 K/UL (ref 0–1)
MONOCYTES NFR BLD: 13.1 % (ref 5–13)
NEUTS SEG # BLD: 1.93 K/UL (ref 1.8–8)
NEUTS SEG NFR BLD: 46.9 % (ref 32–75)
NRBC # BLD: 0 K/UL (ref 0–0.01)
NRBC BLD-RTO: 0 PER 100 WBC
PLATELET # BLD AUTO: 189 K/UL (ref 150–400)
PMV BLD AUTO: 12.2 FL (ref 8.9–12.9)
POTASSIUM SERPL-SCNC: 4.3 MMOL/L (ref 3.5–5.1)
RBC # BLD AUTO: 4.14 M/UL (ref 3.8–5.2)
SODIUM SERPL-SCNC: 136 MMOL/L (ref 136–145)
WBC # BLD AUTO: 4.1 K/UL (ref 3.6–11)

## 2025-01-24 PROCEDURE — 96361 HYDRATE IV INFUSION ADD-ON: CPT

## 2025-01-24 PROCEDURE — 80048 BASIC METABOLIC PNL TOTAL CA: CPT

## 2025-01-24 PROCEDURE — 2580000003 HC RX 258: Performed by: STUDENT IN AN ORGANIZED HEALTH CARE EDUCATION/TRAINING PROGRAM

## 2025-01-24 PROCEDURE — 96360 HYDRATION IV INFUSION INIT: CPT

## 2025-01-24 PROCEDURE — 36415 COLL VENOUS BLD VENIPUNCTURE: CPT

## 2025-01-24 PROCEDURE — 99284 EMERGENCY DEPT VISIT MOD MDM: CPT

## 2025-01-24 PROCEDURE — 85025 COMPLETE CBC W/AUTO DIFF WBC: CPT

## 2025-01-24 RX ORDER — 0.9 % SODIUM CHLORIDE 0.9 %
2000 INTRAVENOUS SOLUTION INTRAVENOUS ONCE
Status: COMPLETED | OUTPATIENT
Start: 2025-01-24 | End: 2025-01-24

## 2025-01-24 RX ORDER — 0.9 % SODIUM CHLORIDE 0.9 %
1000 INTRAVENOUS SOLUTION INTRAVENOUS ONCE
Status: DISCONTINUED | OUTPATIENT
Start: 2025-01-24 | End: 2025-01-24

## 2025-01-24 RX ADMIN — SODIUM CHLORIDE 2000 ML: 9 INJECTION, SOLUTION INTRAVENOUS at 08:16

## 2025-01-24 ASSESSMENT — LIFESTYLE VARIABLES
HOW MANY STANDARD DRINKS CONTAINING ALCOHOL DO YOU HAVE ON A TYPICAL DAY: PATIENT DOES NOT DRINK
HOW OFTEN DO YOU HAVE A DRINK CONTAINING ALCOHOL: NEVER

## 2025-01-24 ASSESSMENT — PAIN - FUNCTIONAL ASSESSMENT: PAIN_FUNCTIONAL_ASSESSMENT: NONE - DENIES PAIN

## 2025-01-24 ASSESSMENT — PAIN SCALES - GENERAL: PAINLEVEL_OUTOF10: 0

## 2025-01-24 NOTE — ED TRIAGE NOTES
Patient arrives POV to ED cc of needing IV hydration. Patient frequently comes here for IV infusions and is told to come in when her urine gets dark, which happened about 2 days ago. Patient says the infusion center she used to go to is closed.    Wound check appt has been changed to reflect surgery date.     ----- Message from January Hopper PA-C sent at 11/15/2018 12:58 PM CST -----  Hi,    Can you please adjust this patient's post-op appointments? Her wound check is currently scheduled only 6 days out.     Thanks!    Tiff

## 2025-01-24 NOTE — DISCHARGE INSTRUCTIONS
Continue your home medications.  Follow-up with your surgeon for further care.  Return to the ER symptoms change or worsen or you have any other concerns.

## 2025-01-24 NOTE — ED PROVIDER NOTES
[facility] EMERGENCY DEPARTMENT  EMERGENCY DEPARTMENT ENCOUNTER      Pt Name: Emily Banks  MRN: [id number]  Birthdate 1940  Date of evaluation: 1/24/2025  Provider: Joaquin Worthy MD    CHIEF COMPLAINT       Chief Complaint   Patient presents with    Dehydration       HISTORY OF PRESENT ILLNESS    HPI    Nursing notes reviewed.    REVIEW OF SYSTEMS     Review of Systems  Unless otherwise stated, a complete review of systems was asked of the patient. Pertinent positives are noted in the HPI section.    PAST MEDICAL HISTORY   No past medical history on file.    SURGICAL HISTORY       Past Surgical History:   Procedure Laterality Date    COLECTOMY      JOINT REPLACEMENT      2019       CURRENT MEDICATIONS       Discharge Medication List as of 1/24/2025  9:48 AM        CONTINUE these medications which have NOT CHANGED    Details   Multiple Vitamin (MULTI VITAMIN) TABS Take 1 tablet by mouth Every DayHistorical Med      ergocalciferol (ERGOCALCIFEROL) 1.25 MG (28545 UT) capsule Take 3,000 Units by mouthHistorical Med      simvastatin (ZOCOR) 10 MG tablet simvastatin 10 mg tabletHistorical Med      escitalopram (LEXAPRO) 20 MG tablet Historical Med      Cholecalciferol 50 MCG (2000 UT) CAPS Take by mouth dailyHistorical Med      denosumab (PROLIA) 60 MG/ML SOSY SC injection Inject 60 mg by subcutaneous route.Historical Med      LORazepam (ATIVAN) 0.5 MG tablet TAKE 0.5 TABLET ORALLY AT BEDTIME AS NEEDED FOR SLEEPHistorical Med      SYNTHROID 25 MCG tablet DAWHistorical Med             ALLERGIES     Compazine [prochlorperazine]    FAMILY HISTORY     No family history on file.     SOCIAL HISTORY       Social History     Socioeconomic History    Marital status:    Tobacco Use    Smoking status: Never    Smokeless tobacco: Never   Substance and Sexual Activity    Alcohol use: Not Currently    Drug use: Not Currently     Social Determinants of Health      Received from [employer]    Financial

## 2025-02-02 ENCOUNTER — HOSPITAL ENCOUNTER (EMERGENCY)
Facility: HOSPITAL | Age: 85
Discharge: HOME | End: 2025-02-02
Attending: EMERGENCY MEDICINE
Payer: COMMERCIAL

## 2025-02-02 VITALS
TEMPERATURE: 98 F | SYSTOLIC BLOOD PRESSURE: 113 MMHG | DIASTOLIC BLOOD PRESSURE: 68 MMHG | RESPIRATION RATE: 18 BRPM | HEART RATE: 111 BPM | OXYGEN SATURATION: 98 %

## 2025-02-02 DIAGNOSIS — E86.0 DEHYDRATION: Primary | ICD-10-CM

## 2025-02-02 LAB
ALBUMIN SERPL-MCNC: 3.5 G/DL (ref 3.5–5)
ALBUMIN/GLOB SERPL: 0.8 (ref 1.1–2.2)
ALP SERPL-CCNC: 43 U/L (ref 45–117)
ALT SERPL-CCNC: 43 U/L (ref 12–78)
ANION GAP SERPL CALC-SCNC: 4 MMOL/L (ref 2–12)
AST SERPL-CCNC: 37 U/L (ref 15–37)
BASOPHILS # BLD: 0.04 K/UL (ref 0–0.1)
BASOPHILS NFR BLD: 0.9 % (ref 0–1)
BILIRUB SERPL-MCNC: 0.4 MG/DL (ref 0.2–1)
BUN SERPL-MCNC: 25 MG/DL (ref 6–20)
BUN/CREAT SERPL: 20 (ref 12–20)
CALCIUM SERPL-MCNC: 9.8 MG/DL (ref 8.5–10.1)
CHLORIDE SERPL-SCNC: 102 MMOL/L (ref 97–108)
CO2 SERPL-SCNC: 30 MMOL/L (ref 21–32)
COMMENT:: NORMAL
CREAT SERPL-MCNC: 1.28 MG/DL (ref 0.55–1.02)
DIFFERENTIAL METHOD BLD: NORMAL
EOSINOPHIL # BLD: 0.06 K/UL (ref 0–0.4)
EOSINOPHIL NFR BLD: 1.4 % (ref 0–7)
ERYTHROCYTE [DISTWIDTH] IN BLOOD BY AUTOMATED COUNT: 13.3 % (ref 11.5–14.5)
GLOBULIN SER CALC-MCNC: 4.2 G/DL (ref 2–4)
GLUCOSE SERPL-MCNC: 86 MG/DL (ref 65–100)
HCT VFR BLD AUTO: 40.4 % (ref 35–47)
HGB BLD-MCNC: 13.2 G/DL (ref 11.5–16)
IMM GRANULOCYTES # BLD AUTO: 0 K/UL (ref 0–0.04)
IMM GRANULOCYTES NFR BLD AUTO: 0 % (ref 0–0.5)
LYMPHOCYTES # BLD: 1.71 K/UL (ref 0.8–3.5)
LYMPHOCYTES NFR BLD: 40.1 % (ref 12–49)
MCH RBC QN AUTO: 30.2 PG (ref 26–34)
MCHC RBC AUTO-ENTMCNC: 32.7 G/DL (ref 30–36.5)
MCV RBC AUTO: 92.4 FL (ref 80–99)
MONOCYTES # BLD: 0.46 K/UL (ref 0–1)
MONOCYTES NFR BLD: 10.8 % (ref 5–13)
NEUTS SEG # BLD: 1.99 K/UL (ref 1.8–8)
NEUTS SEG NFR BLD: 46.8 % (ref 32–75)
NRBC # BLD: 0 K/UL (ref 0–0.01)
NRBC BLD-RTO: 0 PER 100 WBC
PLATELET # BLD AUTO: 176 K/UL (ref 150–400)
PMV BLD AUTO: 11.7 FL (ref 8.9–12.9)
POTASSIUM SERPL-SCNC: 4.5 MMOL/L (ref 3.5–5.1)
PROT SERPL-MCNC: 7.7 G/DL (ref 6.4–8.2)
RBC # BLD AUTO: 4.37 M/UL (ref 3.8–5.2)
SODIUM SERPL-SCNC: 136 MMOL/L (ref 136–145)
SPECIMEN HOLD: NORMAL
WBC # BLD AUTO: 4.3 K/UL (ref 3.6–11)

## 2025-02-02 PROCEDURE — 36415 COLL VENOUS BLD VENIPUNCTURE: CPT

## 2025-02-02 PROCEDURE — 99284 EMERGENCY DEPT VISIT MOD MDM: CPT

## 2025-02-02 PROCEDURE — 96360 HYDRATION IV INFUSION INIT: CPT

## 2025-02-02 PROCEDURE — 80053 COMPREHEN METABOLIC PANEL: CPT

## 2025-02-02 PROCEDURE — 2580000003 HC RX 258: Performed by: EMERGENCY MEDICINE

## 2025-02-02 PROCEDURE — 96361 HYDRATE IV INFUSION ADD-ON: CPT

## 2025-02-02 PROCEDURE — 85025 COMPLETE CBC W/AUTO DIFF WBC: CPT

## 2025-02-02 RX ORDER — 0.9 % SODIUM CHLORIDE 0.9 %
1000 INTRAVENOUS SOLUTION INTRAVENOUS ONCE
Status: COMPLETED | OUTPATIENT
Start: 2025-02-02 | End: 2025-02-02

## 2025-02-02 RX ADMIN — SODIUM CHLORIDE 1000 ML: 9 INJECTION, SOLUTION INTRAVENOUS at 11:28

## 2025-02-02 RX ADMIN — SODIUM CHLORIDE 1000 ML: 9 INJECTION, SOLUTION INTRAVENOUS at 11:27

## 2025-02-02 ASSESSMENT — PAIN - FUNCTIONAL ASSESSMENT: PAIN_FUNCTIONAL_ASSESSMENT: NONE - DENIES PAIN
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
## 2025-02-02 NOTE — ED TRIAGE NOTES
Patient arrives via POV with complaints of needing IV hydration. Patient comes to ED for IV infusions frequently and reports that she is not making much urine.

## 2025-02-18 ENCOUNTER — HOSPITAL ENCOUNTER (OUTPATIENT)
Facility: HOSPITAL | Age: 85
Setting detail: INFUSION SERIES
Discharge: HOME | End: 2025-02-18
Payer: COMMERCIAL

## 2025-02-18 VITALS
HEART RATE: 55 BPM | SYSTOLIC BLOOD PRESSURE: 104 MMHG | DIASTOLIC BLOOD PRESSURE: 58 MMHG | TEMPERATURE: 97.8 F | RESPIRATION RATE: 18 BRPM | OXYGEN SATURATION: 99 %

## 2025-02-18 DIAGNOSIS — E86.0 DEHYDRATION: Primary | ICD-10-CM

## 2025-02-18 DIAGNOSIS — N18.32 STAGE 3B CHRONIC KIDNEY DISEASE (HCC): ICD-10-CM

## 2025-02-18 PROCEDURE — 96361 HYDRATE IV INFUSION ADD-ON: CPT

## 2025-02-18 PROCEDURE — 96360 HYDRATION IV INFUSION INIT: CPT

## 2025-02-18 PROCEDURE — 2580000003 HC RX 258: Performed by: FAMILY MEDICINE

## 2025-02-18 RX ORDER — DEXTROSE MONOHYDRATE AND SODIUM CHLORIDE 5; .45 G/100ML; G/100ML
INJECTION, SOLUTION INTRAVENOUS ONCE
Status: COMPLETED | OUTPATIENT
Start: 2025-02-18 | End: 2025-02-18

## 2025-02-18 RX ORDER — DEXTROSE MONOHYDRATE AND SODIUM CHLORIDE 5; .45 G/100ML; G/100ML
INJECTION, SOLUTION INTRAVENOUS ONCE
Status: CANCELLED
Start: 2025-03-04 | End: 2025-03-04

## 2025-02-18 RX ADMIN — DEXTROSE AND SODIUM CHLORIDE: 5; 450 INJECTION, SOLUTION INTRAVENOUS at 09:23

## 2025-02-18 ASSESSMENT — PAIN SCALES - GENERAL: PAINLEVEL_OUTOF10: 0

## 2025-02-18 NOTE — PLAN OF CARE
Patient tolerated IV Hydration without difficulty   Problem: Safety - Adult  Goal: Free from fall injury  Outcome: Progressing

## 2025-02-18 NOTE — PROGRESS NOTES
[facility] Peds/Adult Note                   Date: 2025    Name: Emily Banks    MRN: [id number]         : 1940    0900 Patient arrives for Hydration without acute problems.   #24 PIV placed in R wrist/forearm.   Fluids administered over 3 hours per patient request.   Please see Epic for complete assessment and education provided.    Vital signs stable throughout and prior to discharge. Patient tolerated procedure well and was discharged without incident.  Patient is aware of next [facility] appointment on 3/4/2025. Appointment card give to the patient.     Ms. Banks's vitals were reviewed prior to and after treatment.   Patient Vitals for the past 12 hrs:   Temp Pulse Resp BP SpO2   25 1230 -- 55 18 (!) 104/58 --   25 0900 97.8 °F (36.6 °C) 76 18 100/66 99 %       Medications given: via #24 PIV to left wrist/forearm   Medications Administered         dextrose 5 % and 0.45 % sodium chloride infusion Admin Date  2025 Action  New Bag Dose   Rate  250 mL/hr Route  IntraVENous Documented By  Dari Salazar RN          Ms. Banks tolerated the infusion, and had no complaints.    [facility] condition.     Future Appointments   Date Time Provider Department Center   2025  1:00 PM Parminder Salazar MD [facility]   [facility]20[facility]  [facility]       DARI SALAZAR RN  2025  1:30 PM

## 2025-02-26 ENCOUNTER — OFFICE VISIT (OUTPATIENT)
Age: 85
End: 2025-02-26
Payer: COMMERCIAL

## 2025-02-26 ENCOUNTER — TELEPHONE (OUTPATIENT)
Age: 85
End: 2025-02-26

## 2025-02-26 VITALS
HEART RATE: 71 BPM | WEIGHT: 82.6 LBS | DIASTOLIC BLOOD PRESSURE: 69 MMHG | HEIGHT: 61 IN | OXYGEN SATURATION: 92 % | SYSTOLIC BLOOD PRESSURE: 101 MMHG | RESPIRATION RATE: 16 BRPM | BODY MASS INDEX: 15.6 KG/M2 | TEMPERATURE: 97.2 F

## 2025-02-26 DIAGNOSIS — E86.0 DEHYDRATION: Primary | ICD-10-CM

## 2025-02-26 PROCEDURE — 1123F ACP DISCUSS/DSCN MKR DOCD: CPT | Performed by: SURGERY

## 2025-02-26 PROCEDURE — 99214 OFFICE O/P EST MOD 30 MIN: CPT | Performed by: SURGERY

## 2025-02-26 ASSESSMENT — PATIENT HEALTH QUESTIONNAIRE - PHQ9
SUM OF ALL RESPONSES TO PHQ QUESTIONS 1-9: 0
SUM OF ALL RESPONSES TO PHQ QUESTIONS 1-9: 0
2. FEELING DOWN, DEPRESSED OR HOPELESS: NOT AT ALL
1. LITTLE INTEREST OR PLEASURE IN DOING THINGS: NOT AT ALL
SUM OF ALL RESPONSES TO PHQ QUESTIONS 1-9: 0
SUM OF ALL RESPONSES TO PHQ QUESTIONS 1-9: 0
SUM OF ALL RESPONSES TO PHQ9 QUESTIONS 1 & 2: 0

## 2025-02-26 NOTE — TELEPHONE ENCOUNTER
Per LPN Euf[facility] appt is set for 2/27/25 w/ [facility] at 1pm as verbally requested by Dr. Salazar. Order was faxed to [facility].

## 2025-02-26 NOTE — PROGRESS NOTES
Identified patient with two patient identifiers (name and ). Reviewed chart in preparation for visit and have obtained necessary documentation.    Emily Banks is a 85 y.o. female  Chief Complaint   Patient presents with    Follow-up     Follow up from 25 office visit. H/O Colectomy     Ht 1.549 m (5' 1\")   BMI 15.50 kg/m²     1. Have you been to the ER, urgent care clinic since your last visit?  Hospitalized since your last visit?no    2. Have you seen or consulted any other health care providers outside of the [facility] since your last visit?  Include any pap smears or colon screening. no

## 2025-02-26 NOTE — PROGRESS NOTES
Surgery Progress Note    2/26/2025    CC: Dehydration    Subjective:     Patient following up for chronic dehydration given her colectomy and ileoanal J-pouch.  She received IV hydration last week at [facility] in the week prior in the ER.  Doing better with probiotics in terms of loose stool.  Has not seen Dr. HUMPHREYS yet.    Constitutional: No fever or chills  Neurologic: No headache  Eyes: No scleral icterus or irritated eyes  Nose: No nasal pain or drainage  Mouth: No oral lesions or sore throat  Cardiac: No palpations or chest pain  Pulmonary: No cough or shortness of breath  Gastrointestinal: No nausea, emesis, diarrhea, or constipation  Genitourinary: No dysuria  Musculoskeletal: No muscle or joint tenderness  Skin: No rashes or lesions  Psychiatric: No anxiety or depressed mood    Objective:     Vitals:    02/26/25 1301   BP: 101/69   Pulse: 71   Resp: 16   Temp: 97.2 °F (36.2 °C)   SpO2: 92%     Body mass index is 15.61 kg/m².  Wt 82 lbs    General: No acute distress, conversant  Eyes: PERRLA, no scleral icterus  HENT: Normocephalic without oral lesions  Neck: Trachea midline without LAD  Cardiac: Normal pulse rate and rhythm  Pulmonary: Symmetric chest rise with normal effort  GI: Soft, NT, ND, no hernia, no splenomegaly  Skin: Warm without rash  Extremities: No edema or joint stiffness  Psych: Appropriate mood and affect    Assessment:     85-year-old female with chronic dehydration following colectomy and J-pouch    Plan:     Normal saline infusion every 10 days or so.  Do this indefinitely.  I reprinted the referral for Dr. HUMPHREYS.  See him to discuss any scoping or follow-up needed for her J-pouch and for her perianal pathology.    I will see her back in about 6 weeks for      Parminder Salazar MD, FACS, [facility]  Bariatric and General Surgeon  [facility]

## 2025-02-27 ENCOUNTER — HOSPITAL ENCOUNTER (OUTPATIENT)
Facility: HOSPITAL | Age: 85
Setting detail: INFUSION SERIES
Discharge: HOME | End: 2025-02-27
Payer: COMMERCIAL

## 2025-02-27 VITALS
OXYGEN SATURATION: 98 % | SYSTOLIC BLOOD PRESSURE: 101 MMHG | DIASTOLIC BLOOD PRESSURE: 57 MMHG | HEART RATE: 62 BPM | TEMPERATURE: 98.2 F | RESPIRATION RATE: 18 BRPM

## 2025-02-27 DIAGNOSIS — E86.0 DEHYDRATION: Primary | ICD-10-CM

## 2025-02-27 PROCEDURE — 96360 HYDRATION IV INFUSION INIT: CPT

## 2025-02-27 PROCEDURE — 2580000003 HC RX 258: Performed by: SURGERY

## 2025-02-27 RX ORDER — 0.9 % SODIUM CHLORIDE 0.9 %
1000 INTRAVENOUS SOLUTION INTRAVENOUS ONCE
Status: COMPLETED | OUTPATIENT
Start: 2025-02-27 | End: 2025-02-27

## 2025-02-27 RX ORDER — 0.9 % SODIUM CHLORIDE 0.9 %
1000 INTRAVENOUS SOLUTION INTRAVENOUS ONCE
OUTPATIENT
Start: 2025-03-09 | End: 2025-03-09

## 2025-02-27 RX ADMIN — SODIUM CHLORIDE 1000 ML: 900 INJECTION, SOLUTION INTRAVENOUS at 13:01

## 2025-02-27 ASSESSMENT — PAIN SCALES - GENERAL: PAINLEVEL_OUTOF10: 0

## 2025-02-27 NOTE — PLAN OF CARE
Patient tolerated IV Hydration without difficulty      Problem: Pain  Goal: Verbalizes/displays adequate comfort level or baseline comfort level  Outcome: Progressing     Problem: Safety - Adult  Goal: Free from fall injury  Outcome: Progressing

## 2025-02-27 NOTE — PROGRESS NOTES
[facility] Peds/Adult Note                   Date: 2025    Name: Emily aBnks    MRN: [id number]         : 1940    1300 Patient arrives for Hydration without acute problems. Please see Epic for complete assessment and education provided.    Vital signs stable throughout and prior to discharge. Patient tolerated procedure well and was discharged without incident.  Patient is aware of next [facility] appointment on 3/10/2025. Appointment card give to the Patient.     Ms. Banks's vitals were reviewed prior to and after treatment.   Patient Vitals for the past 12 hrs:   Temp Pulse Resp BP SpO2   25 1400 -- 62 18 (!) 101/57 --   25 1300 98.2 °F (36.8 °C) 73 18 102/67 98 %     Medications given: via #24 PIV L forearm  Medications Administered         sodium chloride 0.9 % bolus 1,000 mL Admin Date  2025 Action  New Bag Dose  1,000 mL Rate  983.6 mL/hr Route  IntraVENous Documented By  Dari Salazar RN          Ms. Banks tolerated the infusion, and had no complaints.    Ms. Banks was [facility] in stable condition.     Future Appointments   Date Time Provider Department Center   3/10/2025 11:00 AM PEDS FASTTRACK 2 [facility]   3/20/2025  1:00 PM PEDS FASTTRACK 1 [facility]   3/31/2025  1:00 PM PEDS FASTTRACK 1 [facility]       DARI SALAZAR RN  2025  2:53 PM

## 2025-03-04 ENCOUNTER — HOSPITAL ENCOUNTER (OUTPATIENT)
Facility: HOSPITAL | Age: 85
Setting detail: INFUSION SERIES
End: 2025-03-04

## 2025-03-07 ENCOUNTER — HOSPITAL ENCOUNTER (OUTPATIENT)
Facility: HOSPITAL | Age: 85
Setting detail: OBSERVATION
Discharge: HOME | End: 2025-03-09
Attending: EMERGENCY MEDICINE | Admitting: SURGERY
Payer: COMMERCIAL

## 2025-03-07 ENCOUNTER — APPOINTMENT (OUTPATIENT)
Facility: HOSPITAL | Age: 85
End: 2025-03-07
Payer: COMMERCIAL

## 2025-03-07 DIAGNOSIS — K56.609 SBO (SMALL BOWEL OBSTRUCTION) (HCC): Primary | ICD-10-CM

## 2025-03-07 LAB
ALBUMIN SERPL-MCNC: 3.3 G/DL (ref 3.5–5)
ALBUMIN/GLOB SERPL: 0.8 (ref 1.1–2.2)
ALP SERPL-CCNC: 45 U/L (ref 45–117)
ALT SERPL-CCNC: 26 U/L (ref 12–78)
ANION GAP SERPL CALC-SCNC: 7 MMOL/L (ref 2–12)
AST SERPL-CCNC: 30 U/L (ref 15–37)
BASOPHILS # BLD: 0.06 K/UL (ref 0–0.1)
BASOPHILS NFR BLD: 0.8 % (ref 0–1)
BILIRUB SERPL-MCNC: 0.3 MG/DL (ref 0.2–1)
BUN SERPL-MCNC: 32 MG/DL (ref 6–20)
BUN/CREAT SERPL: 26 (ref 12–20)
CALCIUM SERPL-MCNC: 10.4 MG/DL (ref 8.5–10.1)
CHLORIDE SERPL-SCNC: 96 MMOL/L (ref 97–108)
CO2 SERPL-SCNC: 32 MMOL/L (ref 21–32)
COMMENT:: NORMAL
CREAT SERPL-MCNC: 1.23 MG/DL (ref 0.55–1.02)
DIFFERENTIAL METHOD BLD: NORMAL
EOSINOPHIL # BLD: 0.09 K/UL (ref 0–0.4)
EOSINOPHIL NFR BLD: 1.1 % (ref 0–7)
ERYTHROCYTE [DISTWIDTH] IN BLOOD BY AUTOMATED COUNT: 13.4 % (ref 11.5–14.5)
GLOBULIN SER CALC-MCNC: 4.4 G/DL (ref 2–4)
GLUCOSE SERPL-MCNC: 103 MG/DL (ref 65–100)
HCT VFR BLD AUTO: 37.5 % (ref 35–47)
HGB BLD-MCNC: 12.5 G/DL (ref 11.5–16)
IMM GRANULOCYTES # BLD AUTO: 0.01 K/UL (ref 0–0.04)
IMM GRANULOCYTES NFR BLD AUTO: 0.1 % (ref 0–0.5)
LIPASE SERPL-CCNC: 54 U/L (ref 13–75)
LYMPHOCYTES # BLD: 1.37 K/UL (ref 0.8–3.5)
LYMPHOCYTES NFR BLD: 17.3 % (ref 12–49)
MCH RBC QN AUTO: 30.8 PG (ref 26–34)
MCHC RBC AUTO-ENTMCNC: 33.3 G/DL (ref 30–36.5)
MCV RBC AUTO: 92.4 FL (ref 80–99)
MONOCYTES # BLD: 0.72 K/UL (ref 0–1)
MONOCYTES NFR BLD: 9.1 % (ref 5–13)
NEUTS SEG # BLD: 5.66 K/UL (ref 1.8–8)
NEUTS SEG NFR BLD: 71.6 % (ref 32–75)
NRBC # BLD: 0 K/UL (ref 0–0.01)
NRBC BLD-RTO: 0 PER 100 WBC
PLATELET # BLD AUTO: 186 K/UL (ref 150–400)
PMV BLD AUTO: 12.6 FL (ref 8.9–12.9)
POTASSIUM SERPL-SCNC: 4.3 MMOL/L (ref 3.5–5.1)
PROT SERPL-MCNC: 7.7 G/DL (ref 6.4–8.2)
RBC # BLD AUTO: 4.06 M/UL (ref 3.8–5.2)
SODIUM SERPL-SCNC: 135 MMOL/L (ref 136–145)
SPECIMEN HOLD: NORMAL
WBC # BLD AUTO: 7.9 K/UL (ref 3.6–11)

## 2025-03-07 PROCEDURE — 96361 HYDRATE IV INFUSION ADD-ON: CPT

## 2025-03-07 PROCEDURE — 83690 ASSAY OF LIPASE: CPT

## 2025-03-07 PROCEDURE — 96374 THER/PROPH/DIAG INJ IV PUSH: CPT

## 2025-03-07 PROCEDURE — 99221 1ST HOSP IP/OBS SF/LOW 40: CPT | Performed by: SURGERY

## 2025-03-07 PROCEDURE — 2580000003 HC RX 258: Performed by: EMERGENCY MEDICINE

## 2025-03-07 PROCEDURE — 2500000003 HC RX 250 WO HCPCS: Performed by: SURGERY

## 2025-03-07 PROCEDURE — 6360000002 HC RX W HCPCS: Performed by: SURGERY

## 2025-03-07 PROCEDURE — G0378 HOSPITAL OBSERVATION PER HR: HCPCS

## 2025-03-07 PROCEDURE — 96372 THER/PROPH/DIAG INJ SC/IM: CPT

## 2025-03-07 PROCEDURE — 85025 COMPLETE CBC W/AUTO DIFF WBC: CPT

## 2025-03-07 PROCEDURE — 74177 CT ABD & PELVIS W/CONTRAST: CPT

## 2025-03-07 PROCEDURE — 6360000004 HC RX CONTRAST MEDICATION: Performed by: STUDENT IN AN ORGANIZED HEALTH CARE EDUCATION/TRAINING PROGRAM

## 2025-03-07 PROCEDURE — 2580000003 HC RX 258: Performed by: SURGERY

## 2025-03-07 PROCEDURE — 99285 EMERGENCY DEPT VISIT HI MDM: CPT

## 2025-03-07 PROCEDURE — 6370000000 HC RX 637 (ALT 250 FOR IP): Performed by: SURGERY

## 2025-03-07 PROCEDURE — 80053 COMPREHEN METABOLIC PANEL: CPT

## 2025-03-07 RX ORDER — ENOXAPARIN SODIUM 100 MG/ML
30 INJECTION SUBCUTANEOUS DAILY
Status: DISCONTINUED | OUTPATIENT
Start: 2025-03-08 | End: 2025-03-07 | Stop reason: ALTCHOICE

## 2025-03-07 RX ORDER — SODIUM CHLORIDE 0.9 % (FLUSH) 0.9 %
5-40 SYRINGE (ML) INJECTION EVERY 12 HOURS SCHEDULED
Status: DISCONTINUED | OUTPATIENT
Start: 2025-03-07 | End: 2025-03-09 | Stop reason: HOSPADM

## 2025-03-07 RX ORDER — ONDANSETRON 2 MG/ML
4 INJECTION INTRAMUSCULAR; INTRAVENOUS EVERY 6 HOURS PRN
Status: DISCONTINUED | OUTPATIENT
Start: 2025-03-07 | End: 2025-03-09 | Stop reason: HOSPADM

## 2025-03-07 RX ORDER — LORAZEPAM 1 MG/1
0.5 TABLET ORAL NIGHTLY
Status: DISCONTINUED | OUTPATIENT
Start: 2025-03-07 | End: 2025-03-09 | Stop reason: HOSPADM

## 2025-03-07 RX ORDER — ACETAMINOPHEN 325 MG/1
650 TABLET ORAL EVERY 4 HOURS PRN
Status: DISCONTINUED | OUTPATIENT
Start: 2025-03-07 | End: 2025-03-09 | Stop reason: HOSPADM

## 2025-03-07 RX ORDER — 0.9 % SODIUM CHLORIDE 0.9 %
1000 INTRAVENOUS SOLUTION INTRAVENOUS ONCE
Status: COMPLETED | OUTPATIENT
Start: 2025-03-07 | End: 2025-03-07

## 2025-03-07 RX ORDER — ESCITALOPRAM OXALATE 10 MG/1
20 TABLET ORAL DAILY
Status: DISCONTINUED | OUTPATIENT
Start: 2025-03-08 | End: 2025-03-09 | Stop reason: HOSPADM

## 2025-03-07 RX ORDER — SODIUM CHLORIDE 9 MG/ML
INJECTION, SOLUTION INTRAVENOUS CONTINUOUS
Status: DISCONTINUED | OUTPATIENT
Start: 2025-03-07 | End: 2025-03-09 | Stop reason: HOSPADM

## 2025-03-07 RX ORDER — ATORVASTATIN CALCIUM 10 MG/1
10 TABLET, FILM COATED ORAL DAILY
Status: DISCONTINUED | OUTPATIENT
Start: 2025-03-07 | End: 2025-03-09 | Stop reason: HOSPADM

## 2025-03-07 RX ORDER — SODIUM CHLORIDE 0.9 % (FLUSH) 0.9 %
5-40 SYRINGE (ML) INJECTION PRN
Status: DISCONTINUED | OUTPATIENT
Start: 2025-03-07 | End: 2025-03-09 | Stop reason: HOSPADM

## 2025-03-07 RX ORDER — HYDRALAZINE HYDROCHLORIDE 20 MG/ML
20 INJECTION INTRAMUSCULAR; INTRAVENOUS EVERY 4 HOURS PRN
Status: DISCONTINUED | OUTPATIENT
Start: 2025-03-07 | End: 2025-03-09 | Stop reason: HOSPADM

## 2025-03-07 RX ORDER — IOPAMIDOL 755 MG/ML
100 INJECTION, SOLUTION INTRAVASCULAR
Status: COMPLETED | OUTPATIENT
Start: 2025-03-07 | End: 2025-03-07

## 2025-03-07 RX ORDER — HYDROMORPHONE HYDROCHLORIDE 1 MG/ML
1 INJECTION, SOLUTION INTRAMUSCULAR; INTRAVENOUS; SUBCUTANEOUS
Status: DISCONTINUED | OUTPATIENT
Start: 2025-03-07 | End: 2025-03-09 | Stop reason: HOSPADM

## 2025-03-07 RX ORDER — 0.9 % SODIUM CHLORIDE 0.9 %
500 INTRAVENOUS SOLUTION INTRAVENOUS ONCE
Status: COMPLETED | OUTPATIENT
Start: 2025-03-07 | End: 2025-03-07

## 2025-03-07 RX ORDER — LEVOTHYROXINE SODIUM 25 UG/1
25 TABLET ORAL DAILY
Status: DISCONTINUED | OUTPATIENT
Start: 2025-03-08 | End: 2025-03-08 | Stop reason: SDUPTHER

## 2025-03-07 RX ORDER — HEPARIN SODIUM 5000 [USP'U]/ML
5000 INJECTION, SOLUTION INTRAVENOUS; SUBCUTANEOUS 2 TIMES DAILY
Status: DISCONTINUED | OUTPATIENT
Start: 2025-03-07 | End: 2025-03-09 | Stop reason: HOSPADM

## 2025-03-07 RX ORDER — OXYCODONE HYDROCHLORIDE 5 MG/1
5 TABLET ORAL EVERY 4 HOURS PRN
Status: DISCONTINUED | OUTPATIENT
Start: 2025-03-07 | End: 2025-03-09 | Stop reason: HOSPADM

## 2025-03-07 RX ORDER — SODIUM CHLORIDE 9 MG/ML
INJECTION, SOLUTION INTRAVENOUS PRN
Status: DISCONTINUED | OUTPATIENT
Start: 2025-03-07 | End: 2025-03-09 | Stop reason: HOSPADM

## 2025-03-07 RX ORDER — OXYCODONE HYDROCHLORIDE 5 MG/1
10 TABLET ORAL EVERY 4 HOURS PRN
Status: DISCONTINUED | OUTPATIENT
Start: 2025-03-07 | End: 2025-03-09 | Stop reason: HOSPADM

## 2025-03-07 RX ORDER — HYDRALAZINE HYDROCHLORIDE 20 MG/ML
20 INJECTION INTRAMUSCULAR; INTRAVENOUS EVERY 4 HOURS PRN
Status: DISCONTINUED | OUTPATIENT
Start: 2025-03-07 | End: 2025-03-07 | Stop reason: SDUPTHER

## 2025-03-07 RX ORDER — ONDANSETRON 4 MG/1
4 TABLET, ORALLY DISINTEGRATING ORAL EVERY 6 HOURS PRN
Status: DISCONTINUED | OUTPATIENT
Start: 2025-03-07 | End: 2025-03-09 | Stop reason: HOSPADM

## 2025-03-07 RX ADMIN — SODIUM CHLORIDE 1000 ML: 0.9 INJECTION, SOLUTION INTRAVENOUS at 20:00

## 2025-03-07 RX ADMIN — SODIUM CHLORIDE 500 ML: 0.9 INJECTION, SOLUTION INTRAVENOUS at 18:11

## 2025-03-07 RX ADMIN — Medication 5 ML: at 22:06

## 2025-03-07 RX ADMIN — SODIUM CHLORIDE: 9 INJECTION, SOLUTION INTRAVENOUS at 21:16

## 2025-03-07 RX ADMIN — IOPAMIDOL 100 ML: 755 INJECTION, SOLUTION INTRAVENOUS at 19:15

## 2025-03-07 RX ADMIN — HYDROMORPHONE HYDROCHLORIDE 1 MG: 1 INJECTION, SOLUTION INTRAMUSCULAR; INTRAVENOUS; SUBCUTANEOUS at 21:22

## 2025-03-07 RX ADMIN — ATORVASTATIN CALCIUM 10 MG: 20 TABLET, FILM COATED ORAL at 21:17

## 2025-03-07 RX ADMIN — HEPARIN SODIUM 5000 UNITS: 5000 INJECTION INTRAVENOUS; SUBCUTANEOUS at 21:25

## 2025-03-07 ASSESSMENT — PAIN DESCRIPTION - LOCATION
LOCATION: ABDOMEN
LOCATION: HEAD
LOCATION: ABDOMEN

## 2025-03-07 ASSESSMENT — PAIN - FUNCTIONAL ASSESSMENT
PAIN_FUNCTIONAL_ASSESSMENT: 0-10
PAIN_FUNCTIONAL_ASSESSMENT: 0-10

## 2025-03-07 ASSESSMENT — PAIN SCALES - GENERAL
PAINLEVEL_OUTOF10: 4
PAINLEVEL_OUTOF10: 8
PAINLEVEL_OUTOF10: 4
PAINLEVEL_OUTOF10: 3
PAINLEVEL_OUTOF10: 7

## 2025-03-07 ASSESSMENT — PAIN DESCRIPTION - ORIENTATION: ORIENTATION: MID

## 2025-03-07 ASSESSMENT — PAIN DESCRIPTION - DESCRIPTORS: DESCRIPTORS: ACHING;CRAMPING

## 2025-03-07 NOTE — H&P
General Surgery History and Physical    CC: Abdominal pain    History of Present Illness:      Emily Banks is a 85 y.o. female who presents with abdominal pain.  Patient known to me for history of total abdominal colectomy and ileoanal J-pouch many years ago with tenuous hydration balance.  She reports abdominal pain that was sharp in nature this morning.  Missed her out patient infusion this week.  Had a BM this morning.  Pain is slightly improved now just feels very dry.  Pain about a 5 or 6 out of 10.  Crampy in nature.  Pain meds have helped.  Eating provokes the pain.    History reviewed. No pertinent past medical history.  Past Surgical History:   Procedure Laterality Date    COLECTOMY      JOINT REPLACEMENT      2019      History reviewed. No pertinent family history.  Social History     Socioeconomic History    Marital status:      Spouse name: None    Number of children: None    Years of education: None    Highest education level: None   Tobacco Use    Smoking status: Never    Smokeless tobacco: Never   Substance and Sexual Activity    Alcohol use: Not Currently    Drug use: Not Currently     Social Determinants of Health      Received from [employer]    Financial Resource Strain   Food Insecurity: No Food Insecurity (8/28/2024)    Hunger Vital Sign     Worried About Running Out of Food in the Last Year: Never true     Ran Out of Food in the Last Year: Never true   Transportation Needs: No Transportation Needs (8/28/2024)    PRAPARE - Transportation     Lack of Transportation (Medical): No     Lack of Transportation (Non-Medical): No    Received from [employer]    Intimate Partner Violence   Housing Stability: Low Risk  (8/28/2024)    Housing Stability Vital Sign     Unable to Pay for Housing in the Last Year: No     Number of Times Moved in the Last Year: 0     Homeless in the Last Year: No      Prior to Admission medications    Medication Sig Start Date End Date Taking?

## 2025-03-07 NOTE — ED PROVIDER NOTES
[facility] [place] EMERGENCY DEPARTMENT  EMERGENCY DEPARTMENT ENCOUNTER      Pt Name: Emily Banks  MRN: [id number]  Birthdate 1940  Date of evaluation: 3/7/2025  Provider: Osito Gelz MD    CHIEF COMPLAINT       Chief Complaint   Patient presents with    Abdominal Pain         HISTORY OF PRESENT ILLNESS    This is an 85-year-old female who presents with a history of recurrent dehydration from a colectomy and ileoanal conduit.  She has a J-pouch as well.  She has had multiple visits to the ED for dehydration and bowel obstruction as a result of her surgery.  She is here today complaining of pain in the mid upper abdomen as well as nausea.  She has had no vomiting and no diarrhea.  She has had no fever or chill and felt fine prior to the onset of the symptoms which began abruptly and was not associated with any particular activity or diet.  She denies any other acute symptomatology.            Review of External Medical Records:     Nursing Notes were reviewed.    REVIEW OF SYSTEMS       Review of Systems   Constitutional:  Negative for activity change, chills, fatigue and fever.   HENT:  Negative for congestion, ear pain, rhinorrhea, sinus pressure, sinus pain, sore throat and trouble swallowing.    Eyes: Negative.    Respiratory:  Negative for cough, chest tightness, shortness of breath, wheezing and stridor.    Cardiovascular:  Negative for chest pain, palpitations and leg swelling.   Gastrointestinal:  Positive for abdominal pain and nausea. Negative for blood in stool, diarrhea and vomiting.   Endocrine: Negative.    Genitourinary:  Negative for decreased urine volume, difficulty urinating, flank pain, frequency and hematuria.   Musculoskeletal:  Negative for back pain, joint swelling and neck pain.   Skin:  Negative for color change, pallor and rash.   Neurological:  Negative for dizziness, syncope, speech difficulty, weakness, numbness and headaches.   Psychiatric/Behavioral:  Negative for agitation

## 2025-03-07 NOTE — ED TRIAGE NOTES
TRIAGE NOTE:  Patient arrives from home with upper abdominal pains x 1.5 hours.    She reports dehydration,     Patient reports pain radiating 3/10 to a 8/10 at times.

## 2025-03-08 LAB
ANION GAP SERPL CALC-SCNC: 6 MMOL/L (ref 2–12)
BUN SERPL-MCNC: 27 MG/DL (ref 6–20)
BUN/CREAT SERPL: 21 (ref 12–20)
CALCIUM SERPL-MCNC: 8.3 MG/DL (ref 8.5–10.1)
CHLORIDE SERPL-SCNC: 108 MMOL/L (ref 97–108)
CO2 SERPL-SCNC: 28 MMOL/L (ref 21–32)
CREAT SERPL-MCNC: 1.28 MG/DL (ref 0.55–1.02)
GLUCOSE SERPL-MCNC: 124 MG/DL (ref 65–100)
POTASSIUM SERPL-SCNC: 4.6 MMOL/L (ref 3.5–5.1)
SODIUM SERPL-SCNC: 142 MMOL/L (ref 136–145)

## 2025-03-08 PROCEDURE — 6360000002 HC RX W HCPCS: Performed by: SURGERY

## 2025-03-08 PROCEDURE — G0378 HOSPITAL OBSERVATION PER HR: HCPCS

## 2025-03-08 PROCEDURE — 99232 SBSQ HOSP IP/OBS MODERATE 35: CPT | Performed by: SURGERY

## 2025-03-08 PROCEDURE — 6370000000 HC RX 637 (ALT 250 FOR IP): Performed by: SURGERY

## 2025-03-08 PROCEDURE — 96372 THER/PROPH/DIAG INJ SC/IM: CPT

## 2025-03-08 PROCEDURE — 96361 HYDRATE IV INFUSION ADD-ON: CPT

## 2025-03-08 PROCEDURE — 2500000003 HC RX 250 WO HCPCS: Performed by: SURGERY

## 2025-03-08 PROCEDURE — 80048 BASIC METABOLIC PNL TOTAL CA: CPT

## 2025-03-08 RX ORDER — LEVOTHYROXINE SODIUM 50 UG/1
50 TABLET ORAL EVERY OTHER DAY
Status: DISCONTINUED | OUTPATIENT
Start: 2025-03-10 | End: 2025-03-09 | Stop reason: HOSPADM

## 2025-03-08 RX ORDER — LEVOTHYROXINE SODIUM 25 UG/1
25 TABLET ORAL EVERY OTHER DAY
Status: DISCONTINUED | OUTPATIENT
Start: 2025-03-09 | End: 2025-03-09 | Stop reason: HOSPADM

## 2025-03-08 RX ADMIN — ESCITALOPRAM OXALATE 20 MG: 10 TABLET ORAL at 09:54

## 2025-03-08 RX ADMIN — ACETAMINOPHEN 650 MG: 325 TABLET ORAL at 15:48

## 2025-03-08 RX ADMIN — ATORVASTATIN CALCIUM 10 MG: 20 TABLET, FILM COATED ORAL at 09:54

## 2025-03-08 RX ADMIN — HEPARIN SODIUM 5000 UNITS: 5000 INJECTION INTRAVENOUS; SUBCUTANEOUS at 21:43

## 2025-03-08 RX ADMIN — Medication 10 ML: at 21:47

## 2025-03-08 RX ADMIN — HEPARIN SODIUM 5000 UNITS: 5000 INJECTION INTRAVENOUS; SUBCUTANEOUS at 09:54

## 2025-03-08 ASSESSMENT — PAIN DESCRIPTION - DESCRIPTORS: DESCRIPTORS: ACHING

## 2025-03-08 ASSESSMENT — PAIN SCALES - GENERAL: PAINLEVEL_OUTOF10: 2

## 2025-03-08 ASSESSMENT — PAIN DESCRIPTION - LOCATION: LOCATION: ABDOMEN

## 2025-03-08 NOTE — ED NOTES
Introduced self to patient in Hallway [address]. Patient was informed regarding waiting of room and admission process. Informed regarding urine collection and its importance. No complaints of severe pain this time

## 2025-03-08 NOTE — ED NOTES
ED TO INPATIENT SBAR HANDOFF    Patient Name: Emily Banks   :  1940  85 y.o.   MRN:  [id number]  ED Room #:  B/HB     Situation  Code Status: Full Code   Allergies: Compazine [prochlorperazine]  Weight: Patient Vitals for the past 96 hrs (Last 3 readings):   Weight   25 1730 37.8 kg (83 lb 5.3 oz)       Arrived from: home    Chief Complaint:   Chief Complaint   Patient presents with    Abdominal Pain       Hospital Problem/Diagnosis:  Principal Problem:    Dehydration  Resolved Problems:    * No resolved hospital problems. *      Mobility: limited bed mobility   ED Fall Risk: Presents to emergency department  because of falls (Syncope, seizure, or loss of consciousness): No, Age > 70: Yes, Altered Mental Status, Intoxication with alcohol or substance confusion (Disorientation, impaired judgment, poor safety awaremess, or inability to follow instructions): No, Impaired Mobility: Ambulates or transfers with assistive devices or assistance; Unable to ambulate or transer.: No, Nursing Judgement: Yes   Fell in ED or prior to admission: no   Restraints: no     Sitter: no   Family/Caregiver Present: no    Neet to know social/safety information: Falls precautions    Background  History: History reviewed. No pertinent past medical history.    Assessment    Abnormal Assessment Findings:   Imaging:   CT ABDOMEN PELVIS W IV CONTRAST Additional Contrast? None   Final Result   Small bowel obstruction secondary to adhesion or internal hernia in the right   lower      Electronically signed by Livia Jessica        Abnormal labs:   Abnormal Labs Reviewed   COMPREHENSIVE METABOLIC PANEL - Abnormal; Notable for the following components:       Result Value    Sodium 135 (*)     Chloride 96 (*)     Glucose 103 (*)     BUN 32 (*)     Creatinine 1.23 (*)     BUN/Creatinine Ratio 26 (*)     Est, Glom Filt Rate 43 (*)     Calcium 10.4 (*)     Albumin 3.3 (*)     Globulin 4.4 (*)     Albumin/Globulin Ratio 0.8 (*)     All other

## 2025-03-08 NOTE — PROGRESS NOTES
Surgery Progress Note    3/8/2025    Admit Date: 3/7/2025  5:54 PM    CC: Thirsty    Subjective:     Patient denies nausea overnight.  No flatus yet.  No BM.  Wants to be out of the hospital by Monday for her mammogram.  No abdominal pain this morning.    Constitutional: No fever or chills  Neurologic: No headache  Eyes: No scleral icterus or irritated eyes  Nose: No nasal pain or drainage  Mouth: No oral lesions or sore throat  Cardiac: No palpations or chest pain  Pulmonary: No cough or shortness of breath  Gastrointestinal: No nausea, emesis, diarrhea, or constipation  Genitourinary: No dysuria  Musculoskeletal: No muscle or joint tenderness  Skin: No rashes or lesions  Psychiatric: No anxiety or depressed mood    Objective:     Vitals:    03/08/25 0809   BP: (!) 96/56   Pulse: 62   Resp:    Temp:    SpO2:        General: No acute distress, conversant  Eyes: PERRLA, no scleral icterus  HENT: Normocephalic without oral lesions  Neck: Trachea midline without LAD  Cardiac: Normal pulse rate and rhythm  Pulmonary: Symmetric chest rise with normal effort  GI: Soft, NT, ND, no hernia, no splenomegaly  Skin: Warm without rash  Extremities: No edema or joint stiffness  Psych: Appropriate mood and affect    Labs, vital signs, and I/O reviewed.    Assessment:     85-year-old female with history of dehydration and partial obstructions with a history of total abdominal colectomy with ileoanal J-pouch    Plan:     As needed pain control  Creatinine back to baseline.  Continue maintenance fluid.  Okay for full liquid diet.  Denies nausea and not distended.  Encourage ambulation  Hopefully will have return of bowel function in the next 24 hours.  We did quickly discussed the need for possible laparoscopy and lysis of adhesions given her multiple readmissions and hard to say that this is purely from dehydration or if it is from a partial obstruction    Parminder Salazar MD, FACS, [facility]  Bariatric and General Surgeon  Gael Antunez

## 2025-03-09 VITALS
HEIGHT: 61 IN | OXYGEN SATURATION: 95 % | HEART RATE: 58 BPM | SYSTOLIC BLOOD PRESSURE: 111 MMHG | BODY MASS INDEX: 15.73 KG/M2 | RESPIRATION RATE: 16 BRPM | WEIGHT: 83.33 LBS | TEMPERATURE: 98.1 F | DIASTOLIC BLOOD PRESSURE: 52 MMHG

## 2025-03-09 PROCEDURE — 6360000002 HC RX W HCPCS: Performed by: SURGERY

## 2025-03-09 PROCEDURE — 96372 THER/PROPH/DIAG INJ SC/IM: CPT

## 2025-03-09 PROCEDURE — 96361 HYDRATE IV INFUSION ADD-ON: CPT

## 2025-03-09 PROCEDURE — 6370000000 HC RX 637 (ALT 250 FOR IP): Performed by: SURGERY

## 2025-03-09 PROCEDURE — G0378 HOSPITAL OBSERVATION PER HR: HCPCS

## 2025-03-09 PROCEDURE — 2580000003 HC RX 258: Performed by: SURGERY

## 2025-03-09 PROCEDURE — 99232 SBSQ HOSP IP/OBS MODERATE 35: CPT | Performed by: SURGERY

## 2025-03-09 RX ADMIN — LEVOTHYROXINE SODIUM 25 MCG: 0.03 TABLET ORAL at 06:49

## 2025-03-09 RX ADMIN — HEPARIN SODIUM 5000 UNITS: 5000 INJECTION INTRAVENOUS; SUBCUTANEOUS at 09:38

## 2025-03-09 RX ADMIN — SODIUM CHLORIDE: 9 INJECTION, SOLUTION INTRAVENOUS at 04:18

## 2025-03-09 RX ADMIN — ATORVASTATIN CALCIUM 10 MG: 20 TABLET, FILM COATED ORAL at 09:38

## 2025-03-09 RX ADMIN — ESCITALOPRAM OXALATE 20 MG: 10 TABLET ORAL at 09:38

## 2025-03-09 ASSESSMENT — PAIN SCALES - GENERAL: PAINLEVEL_OUTOF10: 0

## 2025-03-09 NOTE — DISCHARGE INSTRUCTIONS
Dr. Salazar's office will work on getting your infusion order changed to 1.5 L infusion weekly.    His office will also work on medication called Gattex for short gut and the dehydration.    Follow-up with Dr. HUMPHREYS for possible scope

## 2025-03-09 NOTE — DISCHARGE SUMMARY
Physician Discharge Summary     Patient ID:  Emily Banks  [id number]  85 y.o.  1940    Admit date: 3/7/2025    Discharge date and time: No discharge date for patient encounter.     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  She was admitted for supportive care and rehydration.  She had return of bowel function.  Diet was advanced.  Patient was discharged after a 3-day hospital stay.  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  She will also follow-up with Dr. HUMPHREYS.    Discharge Exam:  Vitals:    03/09/25 0756   BP: (!) 111/52   Pulse: 58   Resp: 16   Temp: 98.1 °F (36.7 °C)   SpO2: 95%     General: No acute distress, conversant  Eyes: PERRLA, no scleral icterus  HENT: Normocephalic without oral lesions  Neck: Trachea midline without LAD  Cardiac: Normal pulse rate and rhythm  Pulmonary: Symmetric chest rise with normal effort  GI: Soft, NT, ND, no hernia, no splenomegaly  Skin: Warm without rash  Extremities: No edema or joint stiffness  Psych: Appropriate mood and affect      Disposition: home    In process/preliminary results:  Outstanding Order Results       No orders found from 2/6/2025 to 3/8/2025.            Patient Instructions:   Current Discharge Medication List        CONTINUE these medications which have NOT CHANGED    Details   LORazepam (ATIVAN) 0.5 MG tablet TAKE 0.5 TABLET ORALLY AT BEDTIME AS NEEDED FOR SLEEP      Multiple Vitamin (MULTI VITAMIN) TABS Take 1 tablet by mouth Every Day      ergocalciferol

## 2025-03-10 ENCOUNTER — HOSPITAL ENCOUNTER (OUTPATIENT)
Facility: HOSPITAL | Age: 85
Setting detail: INFUSION SERIES
End: 2025-03-10

## 2025-03-10 ENCOUNTER — TELEPHONE (OUTPATIENT)
Age: 85
End: 2025-03-10

## 2025-03-10 NOTE — TELEPHONE ENCOUNTER
Called [facility], two patient identifiers used for patient verification, name and .      Spoke with Abigail Miller Nurse, Verbal order given with Read back from Dr miller to increase NS to 1500ML over 1.5 hours once weekly.    Abigail took order with read back.

## 2025-03-10 NOTE — TELEPHONE ENCOUNTER
----- Message from Dr. Parminder Salazar MD sent at 3/9/2025  9:27 AM EDT -----  Regarding: tasks  Please work with the patient and [facility] to get her a liter and a half of normal saline every week.  Already has a signed document from a week or 2 ago but need to addend it.          Please check with pharmacy company for medication called Gattex.  See if we can get this for her to try for her short gut and dehydration.

## 2025-03-10 NOTE — PROGRESS NOTES
Patient with essentially short gut.  She has a history of a total abdominal colectomy.  She remains severely malnourished.  BMI is 15.75.  She is dependent on IV infusions every week with 1.5 L of fluid.  Multiple admissions for dehydration over the last 4 months.  We will try to get her on Gattex to improve intestinal absorption of nutrition.  She will also see Dr. HUMPHREYS to consider a pouchoscopy.    Parminder Salazar MD, FACS, [facility]  Bariatric and General Surgeon  [facility]

## 2025-03-10 NOTE — TELEPHONE ENCOUNTER
Called pharmacy, inquired about Gattax, was advised by the pharmacy they are unable to get that medication.  MD Salazar was advised.

## 2025-03-11 ENCOUNTER — HOSPITAL ENCOUNTER (OUTPATIENT)
Facility: HOSPITAL | Age: 85
Setting detail: INFUSION SERIES
Discharge: HOME | End: 2025-03-11
Payer: COMMERCIAL

## 2025-03-11 VITALS
RESPIRATION RATE: 16 BRPM | DIASTOLIC BLOOD PRESSURE: 61 MMHG | TEMPERATURE: 97.5 F | OXYGEN SATURATION: 95 % | SYSTOLIC BLOOD PRESSURE: 109 MMHG | HEART RATE: 81 BPM

## 2025-03-11 DIAGNOSIS — E86.0 DEHYDRATION: Primary | ICD-10-CM

## 2025-03-11 PROCEDURE — 96360 HYDRATION IV INFUSION INIT: CPT

## 2025-03-11 PROCEDURE — 2580000003 HC RX 258: Performed by: SURGERY

## 2025-03-11 PROCEDURE — 96361 HYDRATE IV INFUSION ADD-ON: CPT

## 2025-03-11 RX ORDER — 0.9 % SODIUM CHLORIDE 0.9 %
1500 INTRAVENOUS SOLUTION INTRAVENOUS ONCE
Status: COMPLETED | OUTPATIENT
Start: 2025-03-11 | End: 2025-03-11

## 2025-03-11 RX ORDER — 0.9 % SODIUM CHLORIDE 0.9 %
1500 INTRAVENOUS SOLUTION INTRAVENOUS ONCE
Status: CANCELLED | OUTPATIENT
Start: 2025-03-18 | End: 2025-03-18

## 2025-03-11 RX ADMIN — SODIUM CHLORIDE 1500 ML: 900 INJECTION, SOLUTION INTRAVENOUS at 10:05

## 2025-03-11 ASSESSMENT — PAIN SCALES - GENERAL: PAINLEVEL_OUTOF10: 0

## 2025-03-12 ENCOUNTER — TELEPHONE (OUTPATIENT)
Age: 85
End: 2025-03-12

## 2025-03-12 NOTE — TELEPHONE ENCOUNTER
----- Message from Dr. Parminder Salazar MD sent at 3/10/2025  9:36 AM EDT -----  We need to work with the Gattex rep and company. Likely home delivery option  ----- Message -----  From: Kim Wilkins LPN  Sent: 3/10/2025   8:59 AM EDT  To: Parminder Salazar MD    [facility] is not able to [facility] the Gattex, they said it was a specialty medication.  Asked if they knew where we could receive it, pharmacist said no.

## 2025-03-12 NOTE — TELEPHONE ENCOUNTER
Called pharmacy, two patient identifiers used for patient verification, name and .    Contacted [facility] to see if they Carried Gattex they do not, reached out to Gattex rep, got list of provider in [place] gave to MD Salazar;

## 2025-03-13 NOTE — TELEPHONE ENCOUNTER
Message was sent to MD Salazar to call patient regarding Gattex.  We have been unable to locate a prescriber in the area that will follow patient on this medication.

## 2025-03-18 ENCOUNTER — HOSPITAL ENCOUNTER (OUTPATIENT)
Facility: HOSPITAL | Age: 85
Setting detail: INFUSION SERIES
Discharge: HOME | End: 2025-03-18
Payer: COMMERCIAL

## 2025-03-18 ENCOUNTER — TELEPHONE (OUTPATIENT)
Age: 85
End: 2025-03-18

## 2025-03-18 VITALS
HEART RATE: 67 BPM | OXYGEN SATURATION: 96 % | TEMPERATURE: 97.8 F | SYSTOLIC BLOOD PRESSURE: 120 MMHG | DIASTOLIC BLOOD PRESSURE: 67 MMHG | RESPIRATION RATE: 16 BRPM

## 2025-03-18 DIAGNOSIS — E86.0 DEHYDRATION: Primary | ICD-10-CM

## 2025-03-18 PROCEDURE — 2580000003 HC RX 258: Performed by: SURGERY

## 2025-03-18 PROCEDURE — 96360 HYDRATION IV INFUSION INIT: CPT

## 2025-03-18 PROCEDURE — 96361 HYDRATE IV INFUSION ADD-ON: CPT

## 2025-03-18 RX ORDER — 0.9 % SODIUM CHLORIDE 0.9 %
1500 INTRAVENOUS SOLUTION INTRAVENOUS ONCE
Status: CANCELLED | OUTPATIENT
Start: 2025-03-25 | End: 2025-03-25

## 2025-03-18 RX ORDER — 0.9 % SODIUM CHLORIDE 0.9 %
1500 INTRAVENOUS SOLUTION INTRAVENOUS ONCE
Status: COMPLETED | OUTPATIENT
Start: 2025-03-18 | End: 2025-03-18

## 2025-03-18 RX ADMIN — SODIUM CHLORIDE 1500 ML: 900 INJECTION, SOLUTION INTRAVENOUS at 09:17

## 2025-03-18 ASSESSMENT — PAIN SCALES - GENERAL: PAINLEVEL_OUTOF10: 0

## 2025-03-18 NOTE — PLAN OF CARE
Patient completed infusion safely and without difficulty.  Problem: Safety - Adult  Goal: Free from fall injury  Outcome: Progressing

## 2025-03-18 NOTE — PROGRESS NOTES
[facility] Peds/Adult Note                       Date: 2025    Name: Emily Banks    MRN: [id number]         : 1940    0900 Patient arrives for 1.5 L hydration without acute problems. Please see Epic for complete assessment and education provided.    Vital signs stable throughout and prior to discharge. Patient tolerated procedure well and was discharged without incident.  Pt is aware of next [facility] appointment on 3/25//25 Appointment card given.      Ms. Banks's vitals were reviewed prior to and after treatment.   Patient Vitals for the past 12 hrs:   Temp Pulse Resp BP SpO2   25 1055 -- 67 16 120/67 --   25 0900 97.8 °F (36.6 °C) 60 17 101/62 96 %     Lab results were obtained and reviewed.  No results found for this or any previous visit (from the past 12 hours).    Medications given:   Medications Administered         sodium chloride 0.9 % bolus 1,500 mL Admin Date  2025 Action  New Bag Dose  1,500 mL Rate  989 mL/hr Route  IntraVENous Documented By  Danielle Temple RN            Ms. Banks tolerated the infusion, and had no complaints.    [facility].     Future Appointments   Date Time Provider Department Center   3/25/2025  9:00 AM PEDS FASTTRACK 2 [facility]   2025  9:00 AM PEDS FASTTRACK 2 [facility]   2025  9:00 AM PEDS FASTTRACK 2 [facility]   4/15/2025  9:00 AM PEDS FASTTRACK 2 [facility]   2025  9:00 AM PEDS FASTTRACK 2 [facility]   2025  9:00 AM PEDS FASTTRACK 2 [facility]       Danielle Temple RN  2025  10:56 AM

## 2025-03-18 NOTE — TELEPHONE ENCOUNTER
Called [facility], spoke with pharmacy tech, two patient identifiers used for patient verification, name and .    Was advised they send script to speciality pharmacy for medication, unsure of when it would be available.

## 2025-03-20 ENCOUNTER — APPOINTMENT (OUTPATIENT)
Facility: HOSPITAL | Age: 85
End: 2025-03-20
Payer: COMMERCIAL

## 2025-03-25 ENCOUNTER — HOSPITAL ENCOUNTER (OUTPATIENT)
Facility: HOSPITAL | Age: 85
Setting detail: INFUSION SERIES
Discharge: HOME | End: 2025-03-25
Payer: COMMERCIAL

## 2025-03-25 VITALS
TEMPERATURE: 98.2 F | DIASTOLIC BLOOD PRESSURE: 74 MMHG | HEART RATE: 59 BPM | OXYGEN SATURATION: 96 % | RESPIRATION RATE: 16 BRPM | SYSTOLIC BLOOD PRESSURE: 109 MMHG

## 2025-03-25 DIAGNOSIS — E86.0 DEHYDRATION: Primary | ICD-10-CM

## 2025-03-25 PROCEDURE — 96361 HYDRATE IV INFUSION ADD-ON: CPT

## 2025-03-25 PROCEDURE — 2580000003 HC RX 258: Performed by: SURGERY

## 2025-03-25 PROCEDURE — 96360 HYDRATION IV INFUSION INIT: CPT

## 2025-03-25 RX ORDER — 0.9 % SODIUM CHLORIDE 0.9 %
1500 INTRAVENOUS SOLUTION INTRAVENOUS ONCE
Status: CANCELLED | OUTPATIENT
Start: 2025-04-01 | End: 2025-04-01

## 2025-03-25 RX ORDER — 0.9 % SODIUM CHLORIDE 0.9 %
1500 INTRAVENOUS SOLUTION INTRAVENOUS ONCE
Status: COMPLETED | OUTPATIENT
Start: 2025-03-25 | End: 2025-03-25

## 2025-03-25 RX ADMIN — SODIUM CHLORIDE 1500 ML: 0.9 INJECTION, SOLUTION INTRAVENOUS at 09:17

## 2025-03-25 ASSESSMENT — PAIN SCALES - GENERAL: PAINLEVEL_OUTOF10: 0

## 2025-03-25 NOTE — PROGRESS NOTES
[facility] Peds/Adult Note                       Date: 2025    Name: Emily Banks    MRN: [id number]         : 1940    0900 Patient arrives for IV Hydration without acute problems. Please see EPIC for complete assessment and education provided.    Vital signs stable throughout and prior to discharge. Patient tolerated procedure well and was discharged without incident.  Patient is aware of next [facility] appointment on 25.  .      Ms. Banks's vitals were reviewed prior to and after treatment.   Patient Vitals for the past 12 hrs:   Temp Pulse Resp BP SpO2   25 1100 -- 59 16 109/74 --   25 0900 98.2 °F (36.8 °C) 63 16 119/70 96 %         Medications given:   Medications Administered         sodium chloride 0.9 % bolus 1,500 mL Admin Date  2025 Action  New Bag Dose  1,500 mL Rate  989 mL/hr Route  IntraVENous Documented By  Maira Parsons RN              Ms. Banks tolerated the treatment and had no complaints.    [facility]. Discharge Instructions provided to patient, patient verbalized understanding.     Future Appointments   Date Time Provider Department Center   2025  9:00 AM PEDS FASTTRACK 2 [facility]   2025  9:00 AM PEDS FASTTRACK 2 [facility]   4/15/2025  9:00 AM PEDS FASTTRACK 2 [facility]   2025  9:00 AM PEDS FASTTRACK 2 [facility]   2025  9:00 AM PEDS FASTTRACK 2 [facility]       Maira Parsons RN  2025  11:16 AM

## 2025-03-31 ENCOUNTER — APPOINTMENT (OUTPATIENT)
Facility: HOSPITAL | Age: 85
End: 2025-03-31
Payer: COMMERCIAL

## 2025-04-01 ENCOUNTER — HOSPITAL ENCOUNTER (OUTPATIENT)
Facility: HOSPITAL | Age: 85
Setting detail: INFUSION SERIES
Discharge: HOME | End: 2025-04-01
Payer: COMMERCIAL

## 2025-04-01 VITALS
SYSTOLIC BLOOD PRESSURE: 96 MMHG | HEART RATE: 57 BPM | RESPIRATION RATE: 17 BRPM | DIASTOLIC BLOOD PRESSURE: 57 MMHG | OXYGEN SATURATION: 96 % | TEMPERATURE: 97.9 F

## 2025-04-01 DIAGNOSIS — E86.0 DEHYDRATION: Primary | ICD-10-CM

## 2025-04-01 PROCEDURE — 96360 HYDRATION IV INFUSION INIT: CPT

## 2025-04-01 PROCEDURE — 2580000003 HC RX 258: Performed by: SURGERY

## 2025-04-01 PROCEDURE — 96361 HYDRATE IV INFUSION ADD-ON: CPT

## 2025-04-01 RX ORDER — 0.9 % SODIUM CHLORIDE 0.9 %
1500 INTRAVENOUS SOLUTION INTRAVENOUS ONCE
OUTPATIENT
Start: 2025-04-08 | End: 2025-04-08

## 2025-04-01 RX ORDER — 0.9 % SODIUM CHLORIDE 0.9 %
1500 INTRAVENOUS SOLUTION INTRAVENOUS ONCE
Status: COMPLETED | OUTPATIENT
Start: 2025-04-01 | End: 2025-04-01

## 2025-04-01 RX ADMIN — SODIUM CHLORIDE 1500 ML: 9 INJECTION, SOLUTION INTRAVENOUS at 08:59

## 2025-04-01 ASSESSMENT — PAIN SCALES - GENERAL: PAINLEVEL_OUTOF10: 0

## 2025-04-01 NOTE — PLAN OF CARE
Pt tolerated hydration without difficulty.        Problem: Pain  Goal: Verbalizes/displays adequate comfort level or baseline comfort level  Outcome: Progressing     Problem: Safety - Adult  Goal: Free from fall injury  Outcome: Progressing

## 2025-04-01 NOTE — PROGRESS NOTES
[facility] Peds/Adult Note                   Date: 2025    Name: Emily Banks    MRN: [id number]         : 1940    0900 Patient arrives for Hydration without acute problems. Please see Epic for complete assessment and education provided.    Vital signs stable throughout and prior to discharge. Patient tolerated procedure well and was discharged without incident.  Patient is aware of next [facility] appointment on 2025.  Appointment card give to the Patient.       Ms. Banks's vitals were reviewed prior to and after treatment.   Patient Vitals for the past 12 hrs:   Temp Pulse Resp BP SpO2   25 1038 -- 57 17 (!) 96/57 --   25 0845 97.9 °F (36.6 °C) 55 18 122/70 96 %     Medications given: via #24 PIV R forearm   Medications Administered         sodium chloride 0.9 % bolus 1,500 mL Admin Date  2025 Action  New Bag Dose  1,500 mL Rate  989 mL/hr Route  IntraVENous Documented By  Dari Salazar RN          Ms. Banks tolerated the infusion, and had no complaints.    [facility] condition.     Future Appointments   Date Time Provider Department Center   2025  9:00 AM PEDS FASTTRACK 2 [facility]   4/15/2025  9:00 AM PEDS FASTTRACK 2 [facility]   2025  9:00 AM PEDS FASTTRACK 2 [facility]   2025  9:00 AM PEDS FASTTRACK 2 [facility]       DARI SALAZAR RN  2025  10:59 AM

## 2025-04-08 ENCOUNTER — HOSPITAL ENCOUNTER (OUTPATIENT)
Facility: HOSPITAL | Age: 85
Setting detail: INFUSION SERIES
Discharge: HOME | End: 2025-04-08
Payer: COMMERCIAL

## 2025-04-08 VITALS
WEIGHT: 82.8 LBS | DIASTOLIC BLOOD PRESSURE: 67 MMHG | BODY MASS INDEX: 15.64 KG/M2 | HEART RATE: 55 BPM | SYSTOLIC BLOOD PRESSURE: 133 MMHG | OXYGEN SATURATION: 94 % | RESPIRATION RATE: 17 BRPM

## 2025-04-08 DIAGNOSIS — E86.0 DEHYDRATION: Primary | ICD-10-CM

## 2025-04-08 PROCEDURE — 2580000003 HC RX 258: Performed by: SURGERY

## 2025-04-08 PROCEDURE — 96360 HYDRATION IV INFUSION INIT: CPT

## 2025-04-08 PROCEDURE — 96361 HYDRATE IV INFUSION ADD-ON: CPT

## 2025-04-08 RX ORDER — 0.9 % SODIUM CHLORIDE 0.9 %
1500 INTRAVENOUS SOLUTION INTRAVENOUS ONCE
Status: COMPLETED | OUTPATIENT
Start: 2025-04-08 | End: 2025-04-08

## 2025-04-08 RX ORDER — 0.9 % SODIUM CHLORIDE 0.9 %
1500 INTRAVENOUS SOLUTION INTRAVENOUS ONCE
Status: CANCELLED | OUTPATIENT
Start: 2025-04-15 | End: 2025-04-15

## 2025-04-08 RX ADMIN — SODIUM CHLORIDE 1500 ML: 9 INJECTION, SOLUTION INTRAVENOUS at 09:17

## 2025-04-08 ASSESSMENT — PAIN SCALES - GENERAL: PAINLEVEL_OUTOF10: 0

## 2025-04-08 NOTE — PROGRESS NOTES
[facility] Peds/Adult Note                       Date: 2025    Name: Emily Banks    MRN: [id number]         : 1940    0900 Patient arrives for HYDRATION without acute problems. Please see Epic for complete assessment and education provided.        Vital signs stable throughout and prior to discharge. Patient tolerated procedure well and was discharged without incident.  Emily is aware of next [facility] appointment on 4/15/25. Appointment card given.      Ms. Banks's vitals were reviewed prior to and after treatment.   Patient Vitals for the past 12 hrs:   Pulse Resp BP SpO2   25 1052 55 17 133/67 --   25 0845 51 16 134/62 94 %         Lab results were obtained and reviewed.  No results found for this or any previous visit (from the past 12 hours).    Medications given:   Medications Administered         sodium chloride 0.9 % bolus 1,500 mL Admin Date  2025 Action  New Bag Dose  1,500 mL Rate  989 mL/hr Route  IntraVENous Documented By  Danielle Julio RN              Ms. Banks tolerated the infusion, and had no complaints.    [facility]. [facility] condition.     Future Appointments   Date Time Provider Department Center   2025 11:15 AM Parminder Salazar MD [facility]   4/15/2025  9:00 AM PEDS FASTTRACK 2 [facility]   2025  9:00 AM PEDS FASTTRACK 2 [facility]   2025  9:00 AM PEDS FASTTRACK 2 [facility]       Danielle Julio RN  2025  10:53 AM

## 2025-04-08 NOTE — PLAN OF CARE
Patient completed hydration safely and without difficulty.  Problem: Pain  Goal: Verbalizes/displays adequate comfort level or baseline comfort level  Outcome: Progressing     Problem: Safety - Adult  Goal: Free from fall injury  Outcome: Progressing

## 2025-04-09 ENCOUNTER — OFFICE VISIT (OUTPATIENT)
Age: 85
End: 2025-04-09
Payer: COMMERCIAL

## 2025-04-09 VITALS
WEIGHT: 83.4 LBS | OXYGEN SATURATION: 98 % | TEMPERATURE: 97.5 F | HEART RATE: 59 BPM | RESPIRATION RATE: 17 BRPM | DIASTOLIC BLOOD PRESSURE: 71 MMHG | HEIGHT: 61 IN | SYSTOLIC BLOOD PRESSURE: 113 MMHG | BODY MASS INDEX: 15.75 KG/M2

## 2025-04-09 DIAGNOSIS — Z90.49 H/O TOTAL COLECTOMY: Primary | ICD-10-CM

## 2025-04-09 PROCEDURE — 1123F ACP DISCUSS/DSCN MKR DOCD: CPT | Performed by: SURGERY

## 2025-04-09 PROCEDURE — 99213 OFFICE O/P EST LOW 20 MIN: CPT | Performed by: SURGERY

## 2025-04-09 ASSESSMENT — PATIENT HEALTH QUESTIONNAIRE - PHQ9
2. FEELING DOWN, DEPRESSED OR HOPELESS: NOT AT ALL
SUM OF ALL RESPONSES TO PHQ QUESTIONS 1-9: 0
7. TROUBLE CONCENTRATING ON THINGS, SUCH AS READING THE NEWSPAPER OR WATCHING TELEVISION: NOT AT ALL
4. FEELING TIRED OR HAVING LITTLE ENERGY: NOT AT ALL
3. TROUBLE FALLING OR STAYING ASLEEP: NOT AT ALL
SUM OF ALL RESPONSES TO PHQ QUESTIONS 1-9: 0
6. FEELING BAD ABOUT YOURSELF - OR THAT YOU ARE A FAILURE OR HAVE LET YOURSELF OR YOUR FAMILY DOWN: NOT AT ALL
10. IF YOU CHECKED OFF ANY PROBLEMS, HOW DIFFICULT HAVE THESE PROBLEMS MADE IT FOR YOU TO DO YOUR WORK, TAKE CARE OF THINGS AT HOME, OR GET ALONG WITH OTHER PEOPLE: NOT DIFFICULT AT ALL
5. POOR APPETITE OR OVEREATING: NOT AT ALL
1. LITTLE INTEREST OR PLEASURE IN DOING THINGS: NOT AT ALL
1. LITTLE INTEREST OR PLEASURE IN DOING THINGS: NOT AT ALL
SUM OF ALL RESPONSES TO PHQ QUESTIONS 1-9: 0
SUM OF ALL RESPONSES TO PHQ QUESTIONS 1-9: 0
8. MOVING OR SPEAKING SO SLOWLY THAT OTHER PEOPLE COULD HAVE NOTICED. OR THE OPPOSITE, BEING SO FIGETY OR RESTLESS THAT YOU HAVE BEEN MOVING AROUND A LOT MORE THAN USUAL: NOT AT ALL
SUM OF ALL RESPONSES TO PHQ QUESTIONS 1-9: 0
9. THOUGHTS THAT YOU WOULD BE BETTER OFF DEAD, OR OF HURTING YOURSELF: NOT AT ALL
2. FEELING DOWN, DEPRESSED OR HOPELESS: NOT AT ALL

## 2025-04-09 NOTE — PROGRESS NOTES
Identified pt with two pt identifiers (name and ). Reviewed chart in preparation for visit and have obtained necessary documentation.    Emily Banks is a 85 y.o. female Follow-up (Treatment plan discussion j pouch)  .    Vitals:    25 1121   BP: 113/71   BP Site: Left Upper Arm   Patient Position: Sitting   BP Cuff Size: Medium Adult   Pulse: 59   Resp: 17   Temp: 97.5 °F (36.4 °C)   TempSrc: Oral   SpO2: 98%   Weight: 37.8 kg (83 lb 6.4 oz)   Height: 1.549 m (5' 1\")          1. Have you been to the ER, urgent care clinic since your last visit?  Hospitalized since your last visit?  no     2. Have you seen or consulted any other health care providers outside of the [facility] since your last visit?  Include any pap smears or colon screening.  no

## 2025-04-09 NOTE — PROGRESS NOTES
Surgery Progress Note    4/9/2025    CC: Follow-up    Subjective:     Patient still receiving regular infusions with [facility].  Feels okay with this after the first few hours.  Saw Dr. MONTERO  Going to have an endoscopy with Jasen grady.  Taking Imodium once a day in the morning which is helping her.    Having 3 or so BMs a day with a high-fiber diet and the Imodium.  Overall pleased with where she is at right now.    Constitutional: No fever or chills  Neurologic: No headache  Eyes: No scleral icterus or irritated eyes  Nose: No nasal pain or drainage  Mouth: No oral lesions or sore throat  Cardiac: No palpations or chest pain  Pulmonary: No cough or shortness of breath  Gastrointestinal: No nausea, emesis, diarrhea, or constipation  Genitourinary: No dysuria  Musculoskeletal: No muscle or joint tenderness  Skin: No rashes or lesions  Psychiatric: No anxiety or depressed mood    Objective:     Vitals:    04/09/25 1121   BP: 113/71   Pulse: 59   Resp: 17   Temp: 97.5 °F (36.4 °C)   SpO2: 98%     Body mass index is 15.76 kg/m².  Wt 83 lbs    General: No acute distress, conversant  Eyes: PERRLA, no scleral icterus  HENT: Normocephalic without oral lesions  Neck: Trachea midline without LAD  Cardiac: Normal pulse rate and rhythm  Pulmonary: Symmetric chest rise with normal effort  GI: Soft, NT, ND, no hernia, no splenomegaly  Skin: Warm without rash  Extremities: No edema or joint stiffness  Psych: Appropriate mood and affect    Assessment:     85-year-old female with better control of her stools and dehydration with history of total abdominal colectomy and ileoanal J-pouch    Plan:     We will follow-up in the next 3 to 4 months to see how she is doing with everything after the endoscopy.      Parminder Salazar MD, FACS, [facility]  Bariatric and General Surgeon  [facility]

## 2025-04-15 ENCOUNTER — HOSPITAL ENCOUNTER (OUTPATIENT)
Facility: HOSPITAL | Age: 85
Setting detail: INFUSION SERIES
Discharge: HOME | End: 2025-04-15
Payer: COMMERCIAL

## 2025-04-15 VITALS
TEMPERATURE: 97.7 F | OXYGEN SATURATION: 97 % | SYSTOLIC BLOOD PRESSURE: 97 MMHG | HEART RATE: 64 BPM | DIASTOLIC BLOOD PRESSURE: 59 MMHG | RESPIRATION RATE: 18 BRPM

## 2025-04-15 DIAGNOSIS — E86.0 DEHYDRATION: Primary | ICD-10-CM

## 2025-04-15 PROCEDURE — 2580000003 HC RX 258: Performed by: SURGERY

## 2025-04-15 PROCEDURE — 96361 HYDRATE IV INFUSION ADD-ON: CPT

## 2025-04-15 PROCEDURE — 96360 HYDRATION IV INFUSION INIT: CPT

## 2025-04-15 RX ORDER — 0.9 % SODIUM CHLORIDE 0.9 %
1500 INTRAVENOUS SOLUTION INTRAVENOUS ONCE
Status: COMPLETED | OUTPATIENT
Start: 2025-04-15 | End: 2025-04-15

## 2025-04-15 RX ORDER — 0.9 % SODIUM CHLORIDE 0.9 %
1500 INTRAVENOUS SOLUTION INTRAVENOUS ONCE
Status: CANCELLED | OUTPATIENT
Start: 2025-04-22 | End: 2025-04-22

## 2025-04-15 RX ADMIN — SODIUM CHLORIDE 1500 ML: 0.9 INJECTION, SOLUTION INTRAVENOUS at 08:53

## 2025-04-15 ASSESSMENT — PAIN SCALES - GENERAL: PAINLEVEL_OUTOF10: 0

## 2025-04-15 NOTE — PROGRESS NOTES
hospitals Peds/Adult Note                       Date: April 15, 2025    Name: Emily Banks    MRN: [id number]         : 1940    0840 Patient arrives for IV Hydration without acute problems. Please see EPIC for complete assessment and education provided.    Vital signs stable throughout and prior to discharge. Patient tolerated procedure well and was discharged without incident.  Patient is aware of next hospitals appointment on 25.        Ms. Banks's vitals were reviewed prior to and after treatment.   Patient Vitals for the past 12 hrs:   Temp Pulse Resp BP SpO2   04/15/25 1030 -- 64 18 (!) 97/59 --   04/15/25 0840 97.7 °F (36.5 °C) 63 18 (!) 92/48 97 %       Medications given:   Medications Administered         sodium chloride 0.9 % bolus 1,500 mL Admin Date  04/15/2025 Action  New Bag Dose  1,500 mL Rate  999 mL/hr Route  IntraVENous Documented By  Maira Parsons RN              Ms. Banks tolerated the treatment and had no complaints.    [facility] condition. Discharge Instructions provided to patient, patient verbalized understanding.     Future Appointments   Date Time Provider Department Center   2025  9:00 AM PEDS FASTTRACK 1 [facility]   2025  9:00 AM PEDS FASTTRACK 1 [facility]       Maira Parsons RN  April 15, 2025  10:45 AM

## 2025-04-22 ENCOUNTER — HOSPITAL ENCOUNTER (OUTPATIENT)
Facility: HOSPITAL | Age: 85
Setting detail: INFUSION SERIES
Discharge: HOME | End: 2025-04-22
Payer: COMMERCIAL

## 2025-04-22 VITALS
SYSTOLIC BLOOD PRESSURE: 119 MMHG | RESPIRATION RATE: 18 BRPM | TEMPERATURE: 97.4 F | OXYGEN SATURATION: 100 % | HEART RATE: 55 BPM | DIASTOLIC BLOOD PRESSURE: 63 MMHG

## 2025-04-22 DIAGNOSIS — E86.0 DEHYDRATION: Primary | ICD-10-CM

## 2025-04-22 PROCEDURE — 2580000003 HC RX 258: Performed by: SURGERY

## 2025-04-22 PROCEDURE — 96361 HYDRATE IV INFUSION ADD-ON: CPT

## 2025-04-22 PROCEDURE — 96360 HYDRATION IV INFUSION INIT: CPT

## 2025-04-22 RX ORDER — 0.9 % SODIUM CHLORIDE 0.9 %
1500 INTRAVENOUS SOLUTION INTRAVENOUS ONCE
Status: COMPLETED | OUTPATIENT
Start: 2025-04-22 | End: 2025-04-22

## 2025-04-22 RX ORDER — 0.9 % SODIUM CHLORIDE 0.9 %
1500 INTRAVENOUS SOLUTION INTRAVENOUS ONCE
Status: CANCELLED | OUTPATIENT
Start: 2025-04-29 | End: 2025-04-29

## 2025-04-22 RX ADMIN — SODIUM CHLORIDE 1500 ML: 900 INJECTION, SOLUTION INTRAVENOUS at 08:40

## 2025-04-22 ASSESSMENT — PAIN SCALES - GENERAL: PAINLEVEL_OUTOF10: 0

## 2025-04-22 NOTE — PROGRESS NOTES
[facility] Peds/Adult Note                       Date: 2025    Name: Emily Banks    MRN: [id number]         : 1940    0825 Patient arrives for IV Hydration without acute problems. Please see EPIC for complete assessment and education provided.    Vital signs stable throughout and prior to discharge. Patient tolerated procedure well and was discharged without incident.  Patient is aware of next [facility] appointment on 25. .      Ms. Banks's vitals were reviewed prior to and after treatment.   Patient Vitals for the past 12 hrs:   Temp Pulse Resp BP SpO2   25 1024 -- 55 18 119/63 --   25 0827 97.4 °F (36.3 °C) 66 18 96/75 100 %         Medications given:   Medications Administered         sodium chloride 0.9 % bolus 1,500 mL Admin Date  2025 Action  New Bag Dose  1,500 mL Rate  989 mL/hr Route  IntraVENous Documented By  Maira Parsons, RN              Ms. Banks tolerated the treatment and had no complaints.    [facility]. [facility] was [facility] condition. Discharge Instructions provided to patient, patient verbalized understanding.     Future Appointments   Date Time Provider Department Center   2025  9:00 AM PEDS FASTTRACK 1 [facility]   2025  9:00 AM PEDS INF CHAIR 1 [facility]   2025  9:00 AM PEDS INF CHAIR 2 [facility]   2025  9:00 AM PEDS INF CHAIR 3 [facility]   2025  9:00 AM PEDS INF CHAIR 1 [facility]   6/3/2025  9:00 AM PEDS INF CHAIR 1 [facility]       Maira Parsons RN  2025  10:56 AM

## 2025-04-29 ENCOUNTER — HOSPITAL ENCOUNTER (OUTPATIENT)
Facility: HOSPITAL | Age: 85
Setting detail: INFUSION SERIES
Discharge: HOME | End: 2025-04-29
Payer: COMMERCIAL

## 2025-04-29 VITALS
DIASTOLIC BLOOD PRESSURE: 63 MMHG | RESPIRATION RATE: 18 BRPM | TEMPERATURE: 98.5 F | HEART RATE: 68 BPM | OXYGEN SATURATION: 100 % | SYSTOLIC BLOOD PRESSURE: 107 MMHG

## 2025-04-29 DIAGNOSIS — E86.0 DEHYDRATION: Primary | ICD-10-CM

## 2025-04-29 PROCEDURE — 2580000003 HC RX 258: Performed by: SURGERY

## 2025-04-29 PROCEDURE — 96361 HYDRATE IV INFUSION ADD-ON: CPT

## 2025-04-29 PROCEDURE — 96360 HYDRATION IV INFUSION INIT: CPT

## 2025-04-29 RX ORDER — 0.9 % SODIUM CHLORIDE 0.9 %
1500 INTRAVENOUS SOLUTION INTRAVENOUS ONCE
Status: COMPLETED | OUTPATIENT
Start: 2025-04-29 | End: 2025-04-29

## 2025-04-29 RX ORDER — 0.9 % SODIUM CHLORIDE 0.9 %
1500 INTRAVENOUS SOLUTION INTRAVENOUS ONCE
OUTPATIENT
Start: 2025-05-06 | End: 2025-05-06

## 2025-04-29 RX ADMIN — SODIUM CHLORIDE 1500 ML: 900 INJECTION, SOLUTION INTRAVENOUS at 08:19

## 2025-04-29 ASSESSMENT — PAIN SCALES - GENERAL
PAINLEVEL_OUTOF10: 0
PAINLEVEL_OUTOF10: 0

## 2025-04-29 NOTE — PROGRESS NOTES
[facility] Peds/Adult Note                       Date: 2025    Name: Emily Banks    MRN: [id number]         : 1940    0805 Patient arrives for IV Hydration without acute problems. Please see Epic for complete assessment and education provided.    Vital signs stable throughout and prior to discharge. Patient tolerated procedure well and was discharged without incident.  Patient is aware of next [facility] appointment on 2025.  Appointment card give to the Patient.       Ms. Banks's vitals were reviewed prior to and after treatment.   Patient Vitals for the past 12 hrs:   Temp Pulse Resp BP SpO2   25 1004 -- 68 18 107/63 --   25 0805 98.5 °F (36.9 °C) 55 18 (!) 110/58 100 %       Medications given:   Medications Administered         sodium chloride 0.9 % bolus 1,500 mL Admin Date  2025 Action  New Bag Dose  1,500 mL Rate  989 mL/hr Route  IntraVENous Documented By  Nader Paiz, RN          Ms. Banks tolerated the infusion, and had no complaints.    Ms. Banks was [facility] in stable condition.     Future Appointments   Date Time Provider Department Center   2025  9:00 AM PEDS INF CHAIR 5 [facility]   2025  9:00 AM PEDS INF CHAIR 2 [facility]   2025  9:00 AM PEDS INF CHAIR 3 [facility]   2025  9:00 AM PEDS INF CHAIR 6 [facility]   6/3/2025  9:00 AM PEDS INF CHAIR 4 [facility]       NADER PAIZ RN  2025  10:33 AM

## 2025-05-06 ENCOUNTER — HOSPITAL ENCOUNTER (OUTPATIENT)
Facility: HOSPITAL | Age: 85
Setting detail: INFUSION SERIES
Discharge: HOME | End: 2025-05-06
Payer: COMMERCIAL

## 2025-05-06 VITALS
SYSTOLIC BLOOD PRESSURE: 113 MMHG | OXYGEN SATURATION: 97 % | DIASTOLIC BLOOD PRESSURE: 75 MMHG | TEMPERATURE: 97.4 F | RESPIRATION RATE: 18 BRPM | HEART RATE: 61 BPM

## 2025-05-06 DIAGNOSIS — E86.0 DEHYDRATION: Primary | ICD-10-CM

## 2025-05-06 PROCEDURE — 96360 HYDRATION IV INFUSION INIT: CPT

## 2025-05-06 PROCEDURE — 2580000003 HC RX 258: Performed by: SURGERY

## 2025-05-06 PROCEDURE — 96361 HYDRATE IV INFUSION ADD-ON: CPT

## 2025-05-06 RX ORDER — 0.9 % SODIUM CHLORIDE 0.9 %
1500 INTRAVENOUS SOLUTION INTRAVENOUS ONCE
Status: COMPLETED | OUTPATIENT
Start: 2025-05-06 | End: 2025-05-06

## 2025-05-06 RX ORDER — 0.9 % SODIUM CHLORIDE 0.9 %
1500 INTRAVENOUS SOLUTION INTRAVENOUS ONCE
Status: CANCELLED | OUTPATIENT
Start: 2025-05-13 | End: 2025-05-13

## 2025-05-06 RX ADMIN — SODIUM CHLORIDE 1500 ML: 900 INJECTION, SOLUTION INTRAVENOUS at 10:20

## 2025-05-06 ASSESSMENT — PAIN SCALES - GENERAL: PAINLEVEL_OUTOF10: 0

## 2025-05-06 NOTE — PROGRESS NOTES
[facility] Peds/Adult Note                   Date: May 6, 2025    Name: Emily Banks    MRN: [id number]         : 1940    1000 Patient arrives for Hydration without acute problems. Please see Epic for complete assessment and education provided.    Vital signs stable throughout and prior to discharge. Patient tolerated procedure well and was discharged without incident.  Patient is aware of next [facility] appointment on 2025. Appointment card give to the patient.       Ms. Banks's vitals were reviewed prior to and after treatment.   Patient Vitals for the past 12 hrs:   Temp Pulse Resp BP SpO2   25 1000 97.4 °F (36.3 °C) 61 18 113/75 97 %     Medications given: #24 PIV L Forearm   Medications Administered         sodium chloride 0.9 % bolus 1,500 mL Admin Date  2025 Action  New Bag Dose  1,500 mL Rate  989 mL/hr Route  IntraVENous Documented By  Dari Salazar RN          Ms. Banks tolerated the infusion, and had no complaints.    [facility].     Future Appointments   Date Time Provider Department Center   2025  9:00 AM PEDS INF CHAIR 2 [facility]   2025  9:00 AM PEDS INF CHAIR 3 [facility]   2025  9:00 AM PEDS INF CHAIR 6 [facility]   6/3/2025  9:00 AM PEDS INF CHAIR 4 [facility]       DARI SALAZAR RN  May 6, 2025  1:43 PM

## 2025-05-06 NOTE — PLAN OF CARE
Patient tolerated her IV Hydration therapy without difficulty.      Problem: Safety - Adult  Goal: Free from fall injury  Outcome: Progressing

## 2025-05-13 ENCOUNTER — HOSPITAL ENCOUNTER (OUTPATIENT)
Facility: HOSPITAL | Age: 85
Setting detail: INFUSION SERIES
Discharge: HOME | End: 2025-05-13
Payer: COMMERCIAL

## 2025-05-13 VITALS
HEART RATE: 54 BPM | DIASTOLIC BLOOD PRESSURE: 71 MMHG | TEMPERATURE: 97.7 F | OXYGEN SATURATION: 99 % | SYSTOLIC BLOOD PRESSURE: 117 MMHG | RESPIRATION RATE: 18 BRPM

## 2025-05-13 DIAGNOSIS — E86.0 DEHYDRATION: Primary | ICD-10-CM

## 2025-05-13 PROCEDURE — 96361 HYDRATE IV INFUSION ADD-ON: CPT

## 2025-05-13 PROCEDURE — 96360 HYDRATION IV INFUSION INIT: CPT

## 2025-05-13 PROCEDURE — 2580000003 HC RX 258: Performed by: SURGERY

## 2025-05-13 RX ORDER — 0.9 % SODIUM CHLORIDE 0.9 %
1500 INTRAVENOUS SOLUTION INTRAVENOUS ONCE
Status: CANCELLED | OUTPATIENT
Start: 2025-05-20 | End: 2025-05-20

## 2025-05-13 RX ORDER — 0.9 % SODIUM CHLORIDE 0.9 %
1500 INTRAVENOUS SOLUTION INTRAVENOUS ONCE
Status: COMPLETED | OUTPATIENT
Start: 2025-05-13 | End: 2025-05-13

## 2025-05-13 RX ADMIN — SODIUM CHLORIDE 1500 ML: 900 INJECTION, SOLUTION INTRAVENOUS at 08:38

## 2025-05-13 ASSESSMENT — PAIN SCALES - GENERAL: PAINLEVEL_OUTOF10: 0

## 2025-05-13 NOTE — PROGRESS NOTES
[facility] Peds/Adult Note                       Date: May 13, 2025    Name: Emily Banks    MRN: [id number]         : 1940    0830 Patient arrives for IV Hydration without acute problems. Please see EPIC for complete assessment and education provided.    Vital signs stable throughout and prior to discharge. Patient tolerated procedure well and was discharged without incident.  Patient is aware of next [facility] appointment on 25.       Ms. Banks's vitals were reviewed prior to and after treatment.   Patient Vitals for the past 12 hrs:   Temp Pulse Resp BP SpO2   25 1018 -- 54 18 117/71 --   25 0829 97.7 °F (36.5 °C) 69 18 (!) 99/56 99 %         Medications given:  Medications Administered         sodium chloride 0.9 % bolus 1,500 mL Admin Date  2025 Action  New Bag Dose  1,500 mL Rate  989 mL/hr Route  IntraVENous Documented By  Maira Parsons RN              Ms. Banks tolerated the treatment and had no complaints.    [facility]. [facility] was [facility] in [facility] condition. Discharge Instructions provided to patient, patient verbalized understanding.     Future Appointments   Date Time Provider Department Center   2025  9:00 AM PEDS INF CHAIR 5 [facility]   2025  9:30 AM PEDS INF CHAIR 2 [facility]   6/3/2025  9:00 AM PEDS INF CHAIR 4 [facility]       Maira Parsons RN  May 13, 2025  10:47 AM     PRE-ENDOSCOPY H&P    Visit Date: 1/3/2024    History:     Silvano Newton is a 69 y.o. male who presents today for endoscopy procedure. See A/P below for indications.    There is no problem list on file for this patient.    Scheduled Meds:  Continuous Infusions:   lactated ringers IV soln 125 mL/hr at 01/03/24 0801     PRN Meds:.  Prior to Admission medications    Medication Sig Start Date End Date Taking? Authorizing Provider   polyethylene glycol (GOLYTELY) 236 g solution Prepare solution according to packaging instructions. 12/29/23   Abdelrahman Tarango MD   fluorouracil (EFUDEX) 5 % cream APPLY TO AFFECTED AREAS TWO TIMES A DAY FOR 2-3 WEEKS  Patient not taking: Reported on 1/3/2024 8/15/23   Marcia Centeno MD   calcipotriene (DOVONEX) 0.005 % cream Apply to affected area BID  Patient not taking: Reported on 1/3/2024 8/15/23   Marcia Centeno MD   mupirocin (BACTROBAN) 2 % ointment Apply daily after washing, then apply Band Aid  Patient not taking: Reported on 1/3/2024 2/13/23   Marcia Centeno MD   predniSONE (DELTASONE) 5 MG tablet Take 1 tablet by mouth daily    Catarina Tracy MD   mycophenolate (CELLCEPT) 500 MG tablet Take 1 tablet by mouth 4 times daily    Catarina Tracy MD   atorvastatin (LIPITOR) 10 MG tablet Take 1 tablet by mouth daily    Catarina Tracy MD   Cholecalciferol (VITAMIN D3) 2000 UNITS CAPS Take by mouth Five times weekly    Catarina Tracy MD   ascorbic acid (VITAMIN C) 500 MG tablet Take 1 tablet by mouth daily    Catarina Tracy MD   Coenzyme Q10 (COQ10) 30 MG CAPS Take by mouth daily    Catarina Tracy MD     No Known Allergies  Past Medical History:   Diagnosis Date    Dental crowns present     Hyperlipidemia     Myasthenia gravis ([facility]) [id number]    dr krause treats from      Presence of direct dental restoration     dental implant     Past Surgical History:   Procedure Laterality Date    TONSILLECTOMY Bilateral        Physical Exam:     BP

## 2025-05-20 ENCOUNTER — HOSPITAL ENCOUNTER (OUTPATIENT)
Facility: HOSPITAL | Age: 85
Setting detail: INFUSION SERIES
Discharge: HOME | End: 2025-05-20
Payer: COMMERCIAL

## 2025-05-20 VITALS
OXYGEN SATURATION: 99 % | TEMPERATURE: 97.5 F | DIASTOLIC BLOOD PRESSURE: 67 MMHG | SYSTOLIC BLOOD PRESSURE: 124 MMHG | HEART RATE: 54 BPM | RESPIRATION RATE: 18 BRPM

## 2025-05-20 DIAGNOSIS — E86.0 DEHYDRATION: Primary | ICD-10-CM

## 2025-05-20 PROCEDURE — 2580000003 HC RX 258: Performed by: SURGERY

## 2025-05-20 PROCEDURE — 96361 HYDRATE IV INFUSION ADD-ON: CPT

## 2025-05-20 PROCEDURE — 96360 HYDRATION IV INFUSION INIT: CPT

## 2025-05-20 RX ORDER — 0.9 % SODIUM CHLORIDE 0.9 %
1500 INTRAVENOUS SOLUTION INTRAVENOUS ONCE
Status: COMPLETED | OUTPATIENT
Start: 2025-05-20 | End: 2025-05-20

## 2025-05-20 RX ORDER — 0.9 % SODIUM CHLORIDE 0.9 %
1500 INTRAVENOUS SOLUTION INTRAVENOUS ONCE
Status: CANCELLED | OUTPATIENT
Start: 2025-05-27 | End: 2025-05-27

## 2025-05-20 RX ADMIN — SODIUM CHLORIDE 1500 ML: 900 INJECTION, SOLUTION INTRAVENOUS at 09:06

## 2025-05-20 ASSESSMENT — PAIN SCALES - GENERAL: PAINLEVEL_OUTOF10: 0

## 2025-05-20 NOTE — PLAN OF CARE
Pt tolerated infusion without difficulty.     Problem: Pain  Goal: Verbalizes/displays adequate comfort level or baseline comfort level  5/20/2025 1206 by Saira Salazar, RN  Outcome: Progressing  5/20/2025 1114 by Saira Salazar, RN  Outcome: Progressing

## 2025-05-20 NOTE — PROGRESS NOTES
[facility] Peds/Adult Note                   Date: May 20, 2025    Name: Emily Banks    MRN: [id number]         : 1940    0900 Patient arrives for Hydration without acute problems. Please see Epic for complete assessment and education provided.    Vital signs stable throughout and prior to discharge. Patient tolerated procedure well and was discharged without incident.  Patient is aware of next [facility] appointment on 2025.  Appointment card give to the Patient.     Ms. Banks's vitals were reviewed prior to and after treatment.   Patient Vitals for the past 12 hrs:   Temp Pulse Resp BP SpO2   25 0834 97.5 °F (36.4 °C) 54 18 124/67 99 %     Medications given: #24 PIV L forearm   Medications Administered         sodium chloride 0.9 % bolus 1,500 mL Admin Date  2025 Action  New Bag Dose  1,500 mL Rate  989 mL/hr Route  IntraVENous Documented By  Dari Salazar RN          Ms. Banks tolerated the infusion, and had no complaints.    [facility]. [facility] condition.     Future Appointments   Date Time Provider Department Center   2025  1:30 PM VIET SO CHAIR 2 [facility]   6/3/2025  9:00 AM PEDS INF CHAIR 4 [facility]   6/10/2025  9:00 AM PEDS INF CHAIR 5 [facility]   2025  9:00 AM PEDS INF CHAIR 1 [facility]       DARI SALAZAR RN  May 20, 2025  12:06 PM

## 2025-05-27 ENCOUNTER — HOSPITAL ENCOUNTER (OUTPATIENT)
Facility: HOSPITAL | Age: 85
Setting detail: INFUSION SERIES
End: 2025-05-27
Payer: COMMERCIAL

## 2025-05-28 ENCOUNTER — HOSPITAL ENCOUNTER (OUTPATIENT)
Facility: HOSPITAL | Age: 85
Setting detail: INFUSION SERIES
Discharge: HOME | End: 2025-05-28
Payer: COMMERCIAL

## 2025-05-28 VITALS
DIASTOLIC BLOOD PRESSURE: 62 MMHG | SYSTOLIC BLOOD PRESSURE: 92 MMHG | HEART RATE: 54 BPM | RESPIRATION RATE: 18 BRPM | TEMPERATURE: 98 F

## 2025-05-28 DIAGNOSIS — E86.0 DEHYDRATION: Primary | ICD-10-CM

## 2025-05-28 PROCEDURE — 96360 HYDRATION IV INFUSION INIT: CPT

## 2025-05-28 PROCEDURE — 96361 HYDRATE IV INFUSION ADD-ON: CPT

## 2025-05-28 PROCEDURE — 2580000003 HC RX 258: Performed by: SURGERY

## 2025-05-28 RX ORDER — 0.9 % SODIUM CHLORIDE 0.9 %
1500 INTRAVENOUS SOLUTION INTRAVENOUS ONCE
OUTPATIENT
Start: 2025-06-03 | End: 2025-06-03

## 2025-05-28 RX ORDER — 0.9 % SODIUM CHLORIDE 0.9 %
1500 INTRAVENOUS SOLUTION INTRAVENOUS ONCE
Status: COMPLETED | OUTPATIENT
Start: 2025-05-28 | End: 2025-05-28

## 2025-05-28 RX ADMIN — SODIUM CHLORIDE 1500 ML: 0.9 INJECTION, SOLUTION INTRAVENOUS at 13:25

## 2025-05-28 ASSESSMENT — PAIN SCALES - GENERAL: PAINLEVEL_OUTOF10: 0

## 2025-05-28 NOTE — PROGRESS NOTES
[facility] Short Note                   Date: May 28, 2025    Name: Emily Banks    MRN: [id number]         : 1940      Pt admit to [facility] for Hydration ambulatory in stable condition. Assessment completed and documented in flowsheets. No acute concerns at this time.    Ms. Banks's vitals were reviewed  Patient Vitals for the past 12 hrs:   Temp Pulse Resp BP   25 1332 98 °F (36.7 °C) 54 18 92/62     Medications given: via PIV in L forearm  Medications Administered         sodium chloride 0.9 % bolus 1,500 mL Admin Date  2025 Action  New Bag Dose  1,500 mL Rate  989 mL/hr Route  IntraVENous Documented By  Arianna Hernandez RN          PIV placed with positive blood return. PIV was flushed and removed per protocol prior to discharge.    Ms. Banks tolerated the infusion and had no complaints.    Ms. Banks was [facility] in stable condition and is aware of future appointments.     Future Appointments   Date Time Provider Department Center   6/3/2025  9:00 AM PEDS INF CHAIR 4 [facility]   6/10/2025  9:00 AM PEDS INF CHAIR 1 [facility]   2025  9:00 AM PEDS INF CHAIR 5 [facility]       Arianna Hernandez RN  May 28, 2025  3:15 PM

## 2025-06-03 ENCOUNTER — HOSPITAL ENCOUNTER (OUTPATIENT)
Facility: HOSPITAL | Age: 85
Setting detail: INFUSION SERIES
Discharge: HOME | End: 2025-06-03
Payer: COMMERCIAL

## 2025-06-03 VITALS
RESPIRATION RATE: 18 BRPM | OXYGEN SATURATION: 97 % | TEMPERATURE: 98 F | SYSTOLIC BLOOD PRESSURE: 123 MMHG | HEART RATE: 55 BPM | DIASTOLIC BLOOD PRESSURE: 66 MMHG

## 2025-06-03 DIAGNOSIS — E86.0 DEHYDRATION: Primary | ICD-10-CM

## 2025-06-03 PROCEDURE — 96360 HYDRATION IV INFUSION INIT: CPT

## 2025-06-03 PROCEDURE — 96361 HYDRATE IV INFUSION ADD-ON: CPT

## 2025-06-03 PROCEDURE — 2580000003 HC RX 258: Performed by: SURGERY

## 2025-06-03 RX ORDER — 0.9 % SODIUM CHLORIDE 0.9 %
1500 INTRAVENOUS SOLUTION INTRAVENOUS ONCE
Status: COMPLETED | OUTPATIENT
Start: 2025-06-03 | End: 2025-06-03

## 2025-06-03 RX ORDER — 0.9 % SODIUM CHLORIDE 0.9 %
1500 INTRAVENOUS SOLUTION INTRAVENOUS ONCE
Status: CANCELLED | OUTPATIENT
Start: 2025-06-10 | End: 2025-06-10

## 2025-06-03 RX ADMIN — SODIUM CHLORIDE 1500 ML: 900 INJECTION, SOLUTION INTRAVENOUS at 08:56

## 2025-06-03 ASSESSMENT — PAIN SCALES - GENERAL: PAINLEVEL_OUTOF10: 0

## 2025-06-03 NOTE — PROGRESS NOTES
[facility] Progress Note      Date: Kandi 3, 2025    Name: Emily Banks    MRN: [id number]         : 1940    0830 - Patient arrives for IV Hydration without acute problems. Please see Epic for complete assessment and education provided.    Vital signs stable throughout and prior to discharge. Patient tolerated procedure well and was discharged without incident. Patient is aware of next [facility] appointment on 06/10/2025. Appointment card give to the Patient.      Ms. Banks's vitals were reviewed prior to and after treatment.   Patient Vitals for the past 12 hrs:   Temp Pulse Resp BP SpO2   25 0837 98 °F (36.7 °C) 55 18 123/66 97 %       Lab results were reviewed.  No results found for this or any previous visit (from the past 12 hours).    Medications given:   Medications Administered         sodium chloride 0.9 % bolus 1,500 mL Admin Date  2025 Action  New Bag Dose  1,500 mL Rate  989 mL/hr Route  IntraVENous Documented By  Cristine Amin, CINDY            Ms. Banks tolerated the infusion, and had no complaints.    Ms. [facility] was [facility] in [facility] condition. Discharge Instructions provided to patient, patient verbalized understanding but denied the request for a copy of after visit summary.     Future Appointments   Date Time Provider Department Center   6/10/2025  9:00 AM PEDS INF CHAIR 1 [facility]   2025  9:00 AM PEDS INF CHAIR 5 [facility]       Cristine Amin RN  Kandi 3, 2025  11:02 AM

## 2025-06-10 ENCOUNTER — HOSPITAL ENCOUNTER (OUTPATIENT)
Facility: HOSPITAL | Age: 85
Setting detail: INFUSION SERIES
Discharge: HOME | End: 2025-06-10
Payer: COMMERCIAL

## 2025-06-10 VITALS
SYSTOLIC BLOOD PRESSURE: 119 MMHG | HEART RATE: 56 BPM | OXYGEN SATURATION: 98 % | TEMPERATURE: 97.8 F | DIASTOLIC BLOOD PRESSURE: 64 MMHG | RESPIRATION RATE: 18 BRPM

## 2025-06-10 DIAGNOSIS — E86.0 DEHYDRATION: Primary | ICD-10-CM

## 2025-06-10 PROCEDURE — 2580000003 HC RX 258: Performed by: SURGERY

## 2025-06-10 PROCEDURE — 96361 HYDRATE IV INFUSION ADD-ON: CPT

## 2025-06-10 PROCEDURE — 96360 HYDRATION IV INFUSION INIT: CPT

## 2025-06-10 RX ORDER — 0.9 % SODIUM CHLORIDE 0.9 %
1500 INTRAVENOUS SOLUTION INTRAVENOUS ONCE
Status: CANCELLED | OUTPATIENT
Start: 2025-06-17 | End: 2025-06-17

## 2025-06-10 RX ORDER — 0.9 % SODIUM CHLORIDE 0.9 %
1500 INTRAVENOUS SOLUTION INTRAVENOUS ONCE
Status: COMPLETED | OUTPATIENT
Start: 2025-06-10 | End: 2025-06-10

## 2025-06-10 RX ADMIN — SODIUM CHLORIDE 1500 ML: 900 INJECTION, SOLUTION INTRAVENOUS at 09:23

## 2025-06-10 ASSESSMENT — PAIN SCALES - GENERAL
PAINLEVEL_OUTOF10: 0
PAINLEVEL_OUTOF10: 0

## 2025-06-10 NOTE — PROGRESS NOTES
[facility] Peds/Adult Note                   Date: Kandi 10, 2025    Name: Emily Banks    MRN: [id number]         : 1940    0830 Patient arrives for Hydration without acute problems. Please see Epic for complete assessment and education provided.    Vital signs stable throughout and prior to discharge. Patient tolerated procedure well and was discharged without incident.  Patient is aware of next [facility] appointment on 2025.  Appointment card give to the Patient.       Ms. Banks's vitals were reviewed prior to and after treatment.   Patient Vitals for the past 12 hrs:   Temp Pulse Resp BP SpO2   06/10/25 1100 -- 56 18 119/64 --   06/10/25 0830 97.8 °F (36.6 °C) 58 18 (!) 105/58 98 %     Medications given:   Medications Administered         sodium chloride 0.9 % bolus 1,500 mL Admin Date  06/10/2025 Action  New Bag Dose  1,500 mL Rate  989 mL/hr Route  IntraVENous Documented By  Maira Huber RN          Ms. Banks tolerated the infusion, and had no complaints.    Ms. Banks was [facility] in stable condition.     Future Appointments   Date Time Provider Department Center   2025  9:00 AM PEDS INF CHAIR 5 [facility]       DARI WATERS RN  Kandi 10, 2025  11:51 AM

## 2025-06-16 ENCOUNTER — TELEPHONE (OUTPATIENT)
Age: 85
End: 2025-06-16

## 2025-06-16 ENCOUNTER — HOSPITAL ENCOUNTER (OUTPATIENT)
Facility: HOSPITAL | Age: 85
Setting detail: INFUSION SERIES
Discharge: HOME | End: 2025-06-16
Payer: COMMERCIAL

## 2025-06-16 VITALS
DIASTOLIC BLOOD PRESSURE: 66 MMHG | OXYGEN SATURATION: 98 % | SYSTOLIC BLOOD PRESSURE: 124 MMHG | HEART RATE: 63 BPM | TEMPERATURE: 98 F | RESPIRATION RATE: 16 BRPM

## 2025-06-16 DIAGNOSIS — E86.0 DEHYDRATION: Primary | ICD-10-CM

## 2025-06-16 PROCEDURE — 96361 HYDRATE IV INFUSION ADD-ON: CPT

## 2025-06-16 PROCEDURE — 96365 THER/PROPH/DIAG IV INF INIT: CPT

## 2025-06-16 PROCEDURE — 2580000003 HC RX 258: Performed by: SURGERY

## 2025-06-16 PROCEDURE — 96366 THER/PROPH/DIAG IV INF ADDON: CPT

## 2025-06-16 PROCEDURE — 96360 HYDRATION IV INFUSION INIT: CPT

## 2025-06-16 RX ORDER — 0.9 % SODIUM CHLORIDE 0.9 %
1500 INTRAVENOUS SOLUTION INTRAVENOUS ONCE
Status: COMPLETED | OUTPATIENT
Start: 2025-06-16 | End: 2025-06-16

## 2025-06-16 RX ORDER — 0.9 % SODIUM CHLORIDE 0.9 %
1500 INTRAVENOUS SOLUTION INTRAVENOUS ONCE
Status: CANCELLED | OUTPATIENT
Start: 2025-06-17 | End: 2025-06-17

## 2025-06-16 RX ADMIN — SODIUM CHLORIDE 1500 ML: 900 INJECTION, SOLUTION INTRAVENOUS at 13:27

## 2025-06-16 ASSESSMENT — PAIN DESCRIPTION - LOCATION: LOCATION: ABDOMEN

## 2025-06-16 ASSESSMENT — PAIN DESCRIPTION - FREQUENCY: FREQUENCY: INTERMITTENT

## 2025-06-16 ASSESSMENT — PAIN SCALES - GENERAL: PAINLEVEL_OUTOF10: 7

## 2025-06-16 NOTE — TELEPHONE ENCOUNTER
Patient called and states she is having abdominal pain and is wondering if she can have infusion today instead of tomorrow.

## 2025-06-16 NOTE — PROGRESS NOTES
[facility] Progress Note    Date: 2025    Name: Emily Banks    MRN: [id number]         : 1940    Ms. Banks arrived ambulatory and in no distress for HYDRATION.      Assessment was completed and documented in flowsheets. No acute concerns at this time. 24G IV placed without difficulty, positive blood return noted.    Ms. Banks's vital signs for this visit.  Vitals:    25 1315   BP: 102/63   Pulse: 62   Resp: 18   Temp: 98 °F (36.7 °C)   SpO2: 98%        Medications given:   Medications Administered         sodium chloride 0.9 % bolus 1,500 mL Admin Date  2025 Action  New Bag Dose  1,500 mL Rate  989 mL/hr Route  IntraVENous Documented By  Maira Huber RN            Ms. Banks tolerated the infusion, and had no complaints.    PIV flushed and removed after completion of infusion. 2x2 and coban placed.     Ms. Banks was [facility] in stable condition and is aware of future appointments.     Future Appointments   Date Time Provider Department Center   2025  1:30 PM PEDS INF CHAIR 4 [facility]       LYNNE ASKEW RN  2025  1:25 PM

## 2025-06-16 NOTE — TELEPHONE ENCOUNTER
[facility] [place] [facility] called they can move patient to today at 1:30 pm.    Patient identified with two patient identifiers. Patient confirmed message received. Patient thankful and in agreement to time given by [facility] [place] for today.  Patient asked if she has endoscopy as mentioned in last progress note.  Patient states Dr. UHMPHREYS did not perform he referred her back to [facility] who also states this is not needed, so she is not scheduled.  Patient informed I will update Dr. Salazar.  Patient expressed understanding.

## 2025-06-17 ENCOUNTER — HOSPITAL ENCOUNTER (OUTPATIENT)
Facility: HOSPITAL | Age: 85
Setting detail: INFUSION SERIES
End: 2025-06-17
Payer: COMMERCIAL

## 2025-06-24 ENCOUNTER — HOSPITAL ENCOUNTER (OUTPATIENT)
Facility: HOSPITAL | Age: 85
Setting detail: INFUSION SERIES
Discharge: HOME | End: 2025-06-24
Payer: COMMERCIAL

## 2025-06-24 VITALS
OXYGEN SATURATION: 97 % | HEART RATE: 67 BPM | SYSTOLIC BLOOD PRESSURE: 102 MMHG | TEMPERATURE: 98.4 F | DIASTOLIC BLOOD PRESSURE: 65 MMHG | RESPIRATION RATE: 16 BRPM

## 2025-06-24 DIAGNOSIS — E86.0 DEHYDRATION: Primary | ICD-10-CM

## 2025-06-24 PROCEDURE — 96361 HYDRATE IV INFUSION ADD-ON: CPT

## 2025-06-24 PROCEDURE — 2580000003 HC RX 258: Performed by: SURGERY

## 2025-06-24 PROCEDURE — 96360 HYDRATION IV INFUSION INIT: CPT

## 2025-06-24 RX ORDER — 0.9 % SODIUM CHLORIDE 0.9 %
1500 INTRAVENOUS SOLUTION INTRAVENOUS ONCE
Status: CANCELLED | OUTPATIENT
Start: 2025-07-01 | End: 2025-07-01

## 2025-06-24 RX ORDER — 0.9 % SODIUM CHLORIDE 0.9 %
1500 INTRAVENOUS SOLUTION INTRAVENOUS ONCE
Status: COMPLETED | OUTPATIENT
Start: 2025-06-24 | End: 2025-06-24

## 2025-06-24 RX ADMIN — SODIUM CHLORIDE 1500 ML: 900 INJECTION, SOLUTION INTRAVENOUS at 08:23

## 2025-06-24 ASSESSMENT — PAIN SCALES - GENERAL
PAINLEVEL_OUTOF10: 0
PAINLEVEL_OUTOF10: 0

## 2025-06-24 NOTE — PROGRESS NOTES
[facility] Peds/Adult Note                       Date: 2025    Name: Emily Banks    MRN: [id number]         : 1940    0810 Patient arrives for IV Hydration without acute problems. Please see Epic for complete assessment and education provided.    Vital signs stable throughout and prior to discharge. Patient tolerated procedure well and was discharged without incident.  Patient is aware of next [facility] appointment on 2025.  Appointment card give to the Patient.       Ms. Banks's vitals were reviewed prior to and after treatment.   Patient Vitals for the past 12 hrs:   Temp Pulse Resp BP SpO2   25 1001 -- 67 16 102/65 --   25 0810 98.4 °F (36.9 °C) 66 18 100/62 97 %       Medications given:   Medications Administered         sodium chloride 0.9 % bolus 1,500 mL Admin Date  2025 Action  New Bag Dose  1,500 mL Rate  989 mL/hr Route  IntraVENous Documented By  Nader Huber RN          Ms. Banks tolerated the IV Hydration without difficulty.    [facility]. [facility] was [facility] in [facility] condition.     Future Appointments   Date Time Provider Department Center   2025 10:15 AM Parminder Salazar MD [facility]   2025  9:00 AM PEDS INF CHAIR [address] [place] [address]       NADER HUBER RN  2025  10:22 AM

## 2025-06-27 ENCOUNTER — OFFICE VISIT (OUTPATIENT)
Age: 85
End: 2025-06-27
Payer: COMMERCIAL

## 2025-06-27 VITALS
RESPIRATION RATE: 17 BRPM | HEIGHT: 61 IN | DIASTOLIC BLOOD PRESSURE: 68 MMHG | SYSTOLIC BLOOD PRESSURE: 106 MMHG | OXYGEN SATURATION: 97 % | WEIGHT: 82.6 LBS | HEART RATE: 58 BPM | BODY MASS INDEX: 15.6 KG/M2 | TEMPERATURE: 97.6 F

## 2025-06-27 DIAGNOSIS — E86.0 DEHYDRATION: Primary | ICD-10-CM

## 2025-06-27 PROCEDURE — 1123F ACP DISCUSS/DSCN MKR DOCD: CPT | Performed by: SURGERY

## 2025-06-27 PROCEDURE — 99213 OFFICE O/P EST LOW 20 MIN: CPT | Performed by: SURGERY

## 2025-06-27 ASSESSMENT — PATIENT HEALTH QUESTIONNAIRE - PHQ9
SUM OF ALL RESPONSES TO PHQ QUESTIONS 1-9: 0
2. FEELING DOWN, DEPRESSED OR HOPELESS: NOT AT ALL
1. LITTLE INTEREST OR PLEASURE IN DOING THINGS: NOT AT ALL
SUM OF ALL RESPONSES TO PHQ QUESTIONS 1-9: 0

## 2025-06-27 NOTE — PROGRESS NOTES
Identified pt with two pt identifiers (name and ). Reviewed chart in preparation for visit and have obtained necessary documentation.    Emily Banks is a 85 y.o. female Follow-up (Follow up from 2025 visit)  .    Vitals:    25 1015   BP: 106/68   BP Site: Left Upper Arm   Patient Position: Sitting   BP Cuff Size: Large Adult   Pulse: 58   Resp: 17   Temp: 97.6 °F (36.4 °C)   TempSrc: Oral   SpO2: 97%   Weight: 37.5 kg (82 lb 9.6 oz)   Height: 1.549 m (5' 1\")          1. Have you been to the ER, urgent care clinic since your last visit?  Hospitalized since your last visit?  no     2. Have you seen or consulted any other health care providers outside of the [facility] since your last visit?  Include any pap smears or colon screening.  no

## 2025-06-27 NOTE — PROGRESS NOTES
Surgery Progress Note    6/27/2025    CC: Dehydration    Subjective:     Patient doing well.  Maintaining with 2 or 3 BMs a day off Imodium.  Taking a probiotic.  Receiving infusions of 1.5 L of NS weekly.  Feels confident and healthy.  Going to travel to [place] soon and requesting IV hydration there.    Constitutional: No fever or chills  Neurologic: No headache  Eyes: No scleral icterus or irritated eyes  Nose: No nasal pain or drainage  Mouth: No oral lesions or sore throat  Cardiac: No palpations or chest pain  Pulmonary: No cough or shortness of breath  Gastrointestinal: No nausea, emesis, diarrhea, or constipation  Genitourinary: No dysuria  Musculoskeletal: No muscle or joint tenderness  Skin: No rashes or lesions  Psychiatric: No anxiety or depressed mood    Objective:     Vitals:    06/27/25 1015   BP: 106/68   Pulse: 58   Resp: 17   Temp: 97.6 °F (36.4 °C)   SpO2: 97%     Body mass index is 15.61 kg/m².  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  Prescription given the patient to go to [place] for this as well.  Holding off on endoscopy with Jasen grady for now.  Imodium if needed.  Follow-up with me as needed.      Parminder Salazar MD, FACS, [facility]  Bariatric and General Surgeon  [facility]

## 2025-07-01 ENCOUNTER — HOSPITAL ENCOUNTER (OUTPATIENT)
Facility: HOSPITAL | Age: 85
Setting detail: INFUSION SERIES
Discharge: HOME | End: 2025-07-01
Payer: COMMERCIAL

## 2025-07-01 VITALS
DIASTOLIC BLOOD PRESSURE: 63 MMHG | RESPIRATION RATE: 18 BRPM | SYSTOLIC BLOOD PRESSURE: 105 MMHG | TEMPERATURE: 98.1 F | HEART RATE: 54 BPM | OXYGEN SATURATION: 97 %

## 2025-07-01 DIAGNOSIS — E86.0 DEHYDRATION: Primary | ICD-10-CM

## 2025-07-01 PROCEDURE — 96360 HYDRATION IV INFUSION INIT: CPT

## 2025-07-01 PROCEDURE — 96361 HYDRATE IV INFUSION ADD-ON: CPT

## 2025-07-01 PROCEDURE — 2580000003 HC RX 258: Performed by: SURGERY

## 2025-07-01 RX ORDER — 0.9 % SODIUM CHLORIDE 0.9 %
1500 INTRAVENOUS SOLUTION INTRAVENOUS ONCE
Status: COMPLETED | OUTPATIENT
Start: 2025-07-01 | End: 2025-07-01

## 2025-07-01 RX ORDER — 0.9 % SODIUM CHLORIDE 0.9 %
1500 INTRAVENOUS SOLUTION INTRAVENOUS ONCE
OUTPATIENT
Start: 2025-07-08 | End: 2025-07-08

## 2025-07-01 RX ADMIN — SODIUM CHLORIDE 1500 ML: 9 INJECTION, SOLUTION INTRAVENOUS at 08:39

## 2025-07-01 ASSESSMENT — PAIN SCALES - GENERAL: PAINLEVEL_OUTOF10: 0

## 2025-07-01 NOTE — PROGRESS NOTES
[facility] Peds/Adult Note                       Date: 2025    Name: Emily Banks    MRN: [id number]         : 1940    0830 Patient arrives for IV Hydration without acute problems. Please see Epic for complete assessment and education provided.    Vital signs stable throughout and prior to discharge. Patient tolerated procedure well and was discharged without incident.  Patient is aware of next [facility] appointment on 2025.  Appointment card give to the Patient.       Ms. Banks's vitals were reviewed prior to and after treatment.   Patient Vitals for the past 12 hrs:   Temp Pulse Resp BP SpO2   25 0830 98.1 °F (36.7 °C) 54 18 105/63 97 %       Medications given:   Medications Administered         sodium chloride 0.9 % bolus 1,500 mL Admin Date  2025 Action  New Bag Dose  1,500 mL Rate  989 mL/hr Route  IntraVENous Documented By  Maira Paiz, CINDY          Ms. Banks tolerated the infusion without difficulty.    [facility] condition.     Future Appointments   Date Time Provider Department Center   2025  8:00 AM VIET FASTTRACK 3 [facility]   7/15/2025  8:30 AM PEDS INF CHAIR 1 [facility]       MAIRA PAIZ RN  2025  11:20 AM

## 2025-07-08 ENCOUNTER — HOSPITAL ENCOUNTER (OUTPATIENT)
Facility: HOSPITAL | Age: 85
Setting detail: INFUSION SERIES
Discharge: HOME | End: 2025-07-08
Payer: COMMERCIAL

## 2025-07-08 VITALS
RESPIRATION RATE: 16 BRPM | SYSTOLIC BLOOD PRESSURE: 105 MMHG | DIASTOLIC BLOOD PRESSURE: 60 MMHG | OXYGEN SATURATION: 98 % | HEART RATE: 66 BPM | TEMPERATURE: 98.1 F

## 2025-07-08 DIAGNOSIS — E86.0 DEHYDRATION: Primary | ICD-10-CM

## 2025-07-08 PROCEDURE — 2580000003 HC RX 258: Performed by: SURGERY

## 2025-07-08 PROCEDURE — 96361 HYDRATE IV INFUSION ADD-ON: CPT

## 2025-07-08 PROCEDURE — 96360 HYDRATION IV INFUSION INIT: CPT

## 2025-07-08 RX ORDER — 0.9 % SODIUM CHLORIDE 0.9 %
1500 INTRAVENOUS SOLUTION INTRAVENOUS ONCE
Status: CANCELLED | OUTPATIENT
Start: 2025-07-15 | End: 2025-07-15

## 2025-07-08 RX ORDER — 0.9 % SODIUM CHLORIDE 0.9 %
1500 INTRAVENOUS SOLUTION INTRAVENOUS ONCE
Status: COMPLETED | OUTPATIENT
Start: 2025-07-08 | End: 2025-07-08

## 2025-07-08 RX ADMIN — SODIUM CHLORIDE 1500 ML: 9 INJECTION, SOLUTION INTRAVENOUS at 08:01

## 2025-07-08 ASSESSMENT — PAIN SCALES - GENERAL: PAINLEVEL_OUTOF10: 0

## 2025-07-08 NOTE — PROGRESS NOTES
[facility] Peds/Adult Note                       Date: 2025    Name: Emily Banks    MRN: [id number]         : 1940    0750 Patient arrives for IV Hydration without acute problems. Please see Epic for complete assessment and education provided.    Vital signs stable throughout and prior to discharge. Patient tolerated procedure well and was discharged without incident.  Patient is aware of next [facility] appointment on 07/15/2025.  Appointment card give to the Patient.       Ms. Banks's vitals were reviewed prior to and after treatment.   Patient Vitals for the past 12 hrs:   Temp Pulse Resp BP SpO2   25 0944 -- 66 16 105/60 --   25 0750 98.1 °F (36.7 °C) 60 18 99/62 98 %     Medications given:   Medications Administered         sodium chloride 0.9 % bolus 1,500 mL Admin Date  2025 Action  New Bag Dose  1,500 mL Rate  989 mL/hr Route  IntraVENous Documented By  Nader Paiz RN          Ms. Banks tolerated the infusion without difficulty.    [facility]. [facility] condition.     Future Appointments   Date Time Provider Department Center   7/15/2025  8:30 AM PEDS INF CHAIR 1 [facility]       NADER PAIZ RN  2025  10:45 AM

## 2025-07-15 ENCOUNTER — HOSPITAL ENCOUNTER (OUTPATIENT)
Facility: HOSPITAL | Age: 85
Setting detail: INFUSION SERIES
Discharge: HOME | End: 2025-07-15
Payer: COMMERCIAL

## 2025-07-15 VITALS
DIASTOLIC BLOOD PRESSURE: 54 MMHG | HEART RATE: 67 BPM | TEMPERATURE: 97.9 F | SYSTOLIC BLOOD PRESSURE: 108 MMHG | RESPIRATION RATE: 16 BRPM | OXYGEN SATURATION: 97 %

## 2025-07-15 DIAGNOSIS — E86.0 DEHYDRATION: Primary | ICD-10-CM

## 2025-07-15 PROCEDURE — 2580000003 HC RX 258: Performed by: SURGERY

## 2025-07-15 PROCEDURE — 96361 HYDRATE IV INFUSION ADD-ON: CPT

## 2025-07-15 PROCEDURE — 96360 HYDRATION IV INFUSION INIT: CPT

## 2025-07-15 RX ORDER — 0.9 % SODIUM CHLORIDE 0.9 %
1500 INTRAVENOUS SOLUTION INTRAVENOUS ONCE
Status: COMPLETED | OUTPATIENT
Start: 2025-07-15 | End: 2025-07-15

## 2025-07-15 RX ORDER — 0.9 % SODIUM CHLORIDE 0.9 %
1500 INTRAVENOUS SOLUTION INTRAVENOUS ONCE
Status: CANCELLED | OUTPATIENT
Start: 2025-07-22 | End: 2025-07-22

## 2025-07-15 RX ADMIN — SODIUM CHLORIDE 1500 ML: 0.9 INJECTION, SOLUTION INTRAVENOUS at 08:29

## 2025-07-15 ASSESSMENT — PAIN SCALES - GENERAL
PAINLEVEL_OUTOF10: 0
PAINLEVEL_OUTOF10: 0

## 2025-07-15 NOTE — PROGRESS NOTES
[facility] Peds/Adult Note                       Date: July 15, 2025    Name: Emily Banks    MRN: [id number]         : 1940    0815 Patient arrives for IV Hydration without acute problems. Please see Epic for complete assessment and education provided.    Vital signs stable throughout and prior to discharge. Patient tolerated procedure well and was discharged without incident.  Patient is aware of next [facility] appointment on 2025.  Appointment card give to the Patient.       Ms. Banks's vitals were reviewed prior to and after treatment.   Patient Vitals for the past 12 hrs:   Temp Pulse Resp BP SpO2   07/15/25 1010 -- 67 16 (!) 108/54 --   07/15/25 0815 97.9 °F (36.6 °C) 69 18 98/62 97 %       Medications given:   Medications Administered         sodium chloride 0.9 % bolus 1,500 mL Admin Date  07/15/2025 Action  New Bag Dose  1,500 mL Rate  989 mL/hr Route  IntraVENous Documented By  Nader Paiz RN          Ms. Banks tolerated the infusion without difficulty.    [facility]. [facility] was [facility] in [facility] condition.     Future Appointments   Date Time Provider Department Center   2025  8:00 AM PEDS INF CHAIR 4 [facility]   2025  9[facility]   2025  8:30 AM PEDS INF CHAIR 1 [facility]   2025  8:30 AM PEDS INF CHAIR 2 [facility]       NADER PAIZ RN  July 15, 2025  11:05 AM

## 2025-07-22 ENCOUNTER — HOSPITAL ENCOUNTER (OUTPATIENT)
Facility: HOSPITAL | Age: 85
Setting detail: INFUSION SERIES
Discharge: HOME | End: 2025-07-22
Payer: COMMERCIAL

## 2025-07-22 VITALS
TEMPERATURE: 97.7 F | SYSTOLIC BLOOD PRESSURE: 114 MMHG | OXYGEN SATURATION: 97 % | DIASTOLIC BLOOD PRESSURE: 65 MMHG | HEART RATE: 54 BPM | RESPIRATION RATE: 16 BRPM

## 2025-07-22 DIAGNOSIS — E86.0 DEHYDRATION: Primary | ICD-10-CM

## 2025-07-22 PROCEDURE — 96361 HYDRATE IV INFUSION ADD-ON: CPT

## 2025-07-22 PROCEDURE — 2580000003 HC RX 258: Performed by: SURGERY

## 2025-07-22 PROCEDURE — 96360 HYDRATION IV INFUSION INIT: CPT

## 2025-07-22 RX ORDER — 0.9 % SODIUM CHLORIDE 0.9 %
1500 INTRAVENOUS SOLUTION INTRAVENOUS ONCE
Status: CANCELLED | OUTPATIENT
Start: 2025-07-29 | End: 2025-07-29

## 2025-07-22 RX ORDER — 0.9 % SODIUM CHLORIDE 0.9 %
1500 INTRAVENOUS SOLUTION INTRAVENOUS ONCE
Status: COMPLETED | OUTPATIENT
Start: 2025-07-22 | End: 2025-07-22

## 2025-07-22 RX ADMIN — SODIUM CHLORIDE 1500 ML: 900 INJECTION, SOLUTION INTRAVENOUS at 08:13

## 2025-07-22 ASSESSMENT — PAIN SCALES - GENERAL
PAINLEVEL_OUTOF10: 0
PAINLEVEL_OUTOF10: 0

## 2025-07-22 NOTE — PROGRESS NOTES
[facility] Peds/Adult Note                       Date: 2025    Name: Emily Banks    MRN: [id number]         : 1940    0800 Patient arrives for IV Hydration without acute problems. Please see Epic for complete assessment and education provided.    Vital signs stable throughout and prior to discharge. Patient tolerated procedure well and was discharged without incident.  Patient is aware of next [facility] appointment on 2025.  Appointment card give to the Patient.       Ms. Banks's vitals were reviewed prior to and after treatment.   Patient Vitals for the past 12 hrs:   Temp Pulse Resp BP SpO2   25 0952 -- 54 16 114/65 --   25 0800 97.7 °F (36.5 °C) 56 16 (!) 94/52 97 %       Medications given:  Medications Administered         sodium chloride 0.9 % bolus 1,500 mL Admin Date  2025 Action  New Bag Dose  1,500 mL Rate  989 mL/hr Route  IntraVENous Documented By  Nader Paiz, RN          Ms. Banks tolerated the infusion, and had no complaints.    [facility]. [facility] was [facility] in [facility] condition.     Future Appointments   Date Time Provider Department Center   2025  9:00 AM PEDS INF CHAIR 2 [facility]   2025  8:30 [facility]   2025  8:30 AM PEDS INF CHAIR 5 [facility]   2025  9:00 AM [facility]   2025  9:00 AM PEDS INF CHAIR 1 [facility]       NADER PAIZ RN  2025  1:53 PM

## 2025-07-29 ENCOUNTER — HOSPITAL ENCOUNTER (OUTPATIENT)
Facility: HOSPITAL | Age: 85
Setting detail: INFUSION SERIES
Discharge: HOME | End: 2025-07-29
Payer: COMMERCIAL

## 2025-07-29 VITALS
SYSTOLIC BLOOD PRESSURE: 109 MMHG | OXYGEN SATURATION: 98 % | HEART RATE: 55 BPM | DIASTOLIC BLOOD PRESSURE: 60 MMHG | RESPIRATION RATE: 16 BRPM | TEMPERATURE: 97.7 F

## 2025-07-29 DIAGNOSIS — E86.0 DEHYDRATION: Primary | ICD-10-CM

## 2025-07-29 PROCEDURE — 96360 HYDRATION IV INFUSION INIT: CPT

## 2025-07-29 PROCEDURE — 96361 HYDRATE IV INFUSION ADD-ON: CPT

## 2025-07-29 PROCEDURE — 2580000003 HC RX 258: Performed by: SURGERY

## 2025-07-29 RX ORDER — 0.9 % SODIUM CHLORIDE 0.9 %
1500 INTRAVENOUS SOLUTION INTRAVENOUS ONCE
Status: COMPLETED | OUTPATIENT
Start: 2025-07-29 | End: 2025-07-29

## 2025-07-29 RX ORDER — 0.9 % SODIUM CHLORIDE 0.9 %
1500 INTRAVENOUS SOLUTION INTRAVENOUS ONCE
OUTPATIENT
Start: 2025-08-05 | End: 2025-08-05

## 2025-07-29 RX ADMIN — SODIUM CHLORIDE 1500 ML: 900 INJECTION, SOLUTION INTRAVENOUS at 08:53

## 2025-07-29 ASSESSMENT — PAIN SCALES - GENERAL
PAINLEVEL_OUTOF10: 0
PAINLEVEL_OUTOF10: 0

## 2025-07-29 NOTE — PROGRESS NOTES
\[facility]\"" Peds/Adult Note                       Date: 2025    Name: Emily Banks    MRN: [id number]         : 1940    0840 Patient arrives for Weekly IV Hydration without acute problems. Please see Epic for complete assessment and education provided.    Vital signs stable throughout and prior to discharge. Patient tolerated procedure well and was discharged without incident.  Patient is aware of next \[facility]\"" appointment on 2025.  Appointment card give to the Patient.       Ms. Banks's vitals were reviewed prior to and after treatment.   Patient Vitals for the past 12 hrs:   Temp Pulse Resp BP SpO2   25 1032 -- 55 16 109/60 98 %   25 0840 97.7 °F (36.5 °C) 56 18 107/70 97 %       Medications given:   Medications Administered         sodium chloride 0.9 % bolus 1,500 mL Admin Date  2025 Action  New Bag Dose  1,500 mL Rate  989 mL/hr Route  IntraVENous Documented By  Nader Paiz RN          Ms. Banks tolerated the infusion, and had no complaints.    [facility].     Future Appointments   Date Time Provider Department Center   2025  8:30 AM PEDS INF CHAIR 1 [facility]   2025  [facility]   2025  9[facility]00 [facility]   2025  9:00 AM PEDS INF CHAIR 1 [facility]       NADER PAIZ RN  2025  10:41 AM

## 2025-08-05 ENCOUNTER — HOSPITAL ENCOUNTER (OUTPATIENT)
Facility: HOSPITAL | Age: 85
Setting detail: INFUSION SERIES
Discharge: HOME | End: 2025-08-05
Payer: COMMERCIAL

## 2025-08-05 VITALS
DIASTOLIC BLOOD PRESSURE: 66 MMHG | RESPIRATION RATE: 16 BRPM | SYSTOLIC BLOOD PRESSURE: 123 MMHG | OXYGEN SATURATION: 98 % | HEART RATE: 55 BPM | TEMPERATURE: 97.6 F

## 2025-08-05 DIAGNOSIS — E86.0 DEHYDRATION: Primary | ICD-10-CM

## 2025-08-05 PROCEDURE — 2580000003 HC RX 258: Performed by: SURGERY

## 2025-08-05 PROCEDURE — 96361 HYDRATE IV INFUSION ADD-ON: CPT

## 2025-08-05 PROCEDURE — 96360 HYDRATION IV INFUSION INIT: CPT

## 2025-08-05 RX ORDER — 0.9 % SODIUM CHLORIDE 0.9 %
1500 INTRAVENOUS SOLUTION INTRAVENOUS ONCE
Status: COMPLETED | OUTPATIENT
Start: 2025-08-05 | End: 2025-08-05

## 2025-08-05 RX ORDER — 0.9 % SODIUM CHLORIDE 0.9 %
1500 INTRAVENOUS SOLUTION INTRAVENOUS ONCE
Status: CANCELLED | OUTPATIENT
Start: 2025-08-12 | End: 2025-08-12

## 2025-08-05 RX ADMIN — SODIUM CHLORIDE 1500 ML: 900 INJECTION, SOLUTION INTRAVENOUS at 08:48

## 2025-08-05 ASSESSMENT — PAIN SCALES - GENERAL
PAINLEVEL_OUTOF10: 0
PAINLEVEL_OUTOF10: 0

## 2025-08-12 ENCOUNTER — HOSPITAL ENCOUNTER (OUTPATIENT)
Facility: HOSPITAL | Age: 85
Setting detail: INFUSION SERIES
Discharge: HOME | End: 2025-08-12
Payer: COMMERCIAL

## 2025-08-12 VITALS
RESPIRATION RATE: 16 BRPM | TEMPERATURE: 97.8 F | SYSTOLIC BLOOD PRESSURE: 110 MMHG | HEART RATE: 55 BPM | DIASTOLIC BLOOD PRESSURE: 60 MMHG | OXYGEN SATURATION: 98 %

## 2025-08-12 DIAGNOSIS — E86.0 DEHYDRATION: Primary | ICD-10-CM

## 2025-08-12 PROCEDURE — 2580000003 HC RX 258: Performed by: SURGERY

## 2025-08-12 PROCEDURE — 96360 HYDRATION IV INFUSION INIT: CPT

## 2025-08-12 PROCEDURE — 96361 HYDRATE IV INFUSION ADD-ON: CPT

## 2025-08-12 RX ORDER — 0.9 % SODIUM CHLORIDE 0.9 %
1500 INTRAVENOUS SOLUTION INTRAVENOUS ONCE
Status: COMPLETED | OUTPATIENT
Start: 2025-08-12 | End: 2025-08-12

## 2025-08-12 RX ORDER — 0.9 % SODIUM CHLORIDE 0.9 %
1500 INTRAVENOUS SOLUTION INTRAVENOUS ONCE
Status: CANCELLED | OUTPATIENT
Start: 2025-08-19 | End: 2025-08-19

## 2025-08-12 RX ADMIN — SODIUM CHLORIDE 1500 ML: 900 INJECTION, SOLUTION INTRAVENOUS at 08:38

## 2025-08-12 ASSESSMENT — PAIN SCALES - GENERAL
PAINLEVEL_OUTOF10: 0
PAINLEVEL_OUTOF10: 0

## 2025-08-19 ENCOUNTER — HOSPITAL ENCOUNTER (OUTPATIENT)
Facility: HOSPITAL | Age: 85
Setting detail: INFUSION SERIES
Discharge: HOME | End: 2025-08-19
Payer: MEDICARE

## 2025-08-19 VITALS
HEART RATE: 65 BPM | RESPIRATION RATE: 16 BRPM | TEMPERATURE: 97.6 F | SYSTOLIC BLOOD PRESSURE: 114 MMHG | OXYGEN SATURATION: 97 % | DIASTOLIC BLOOD PRESSURE: 67 MMHG

## 2025-08-19 DIAGNOSIS — E86.0 DEHYDRATION: Primary | ICD-10-CM

## 2025-08-19 PROCEDURE — 2580000003 HC RX 258: Performed by: SURGERY

## 2025-08-19 PROCEDURE — 96361 HYDRATE IV INFUSION ADD-ON: CPT

## 2025-08-19 PROCEDURE — 96360 HYDRATION IV INFUSION INIT: CPT

## 2025-08-19 RX ORDER — 0.9 % SODIUM CHLORIDE 0.9 %
1500 INTRAVENOUS SOLUTION INTRAVENOUS ONCE
Status: COMPLETED | OUTPATIENT
Start: 2025-08-19 | End: 2025-08-19

## 2025-08-19 RX ORDER — 0.9 % SODIUM CHLORIDE 0.9 %
1500 INTRAVENOUS SOLUTION INTRAVENOUS ONCE
Status: CANCELLED | OUTPATIENT
Start: 2025-08-26 | End: 2025-08-26

## 2025-08-19 RX ADMIN — SODIUM CHLORIDE 1500 ML: 900 INJECTION, SOLUTION INTRAVENOUS at 08:53

## 2025-08-19 ASSESSMENT — PAIN SCALES - GENERAL: PAINLEVEL_OUTOF10: 0

## 2025-08-26 ENCOUNTER — HOSPITAL ENCOUNTER (OUTPATIENT)
Facility: HOSPITAL | Age: 85
Setting detail: INFUSION SERIES
Discharge: HOME | End: 2025-08-26
Payer: MEDICARE

## 2025-08-26 VITALS
BODY MASS INDEX: 15.68 KG/M2 | DIASTOLIC BLOOD PRESSURE: 77 MMHG | WEIGHT: 83 LBS | SYSTOLIC BLOOD PRESSURE: 122 MMHG | HEART RATE: 61 BPM | RESPIRATION RATE: 16 BRPM | OXYGEN SATURATION: 98 % | TEMPERATURE: 97.7 F

## 2025-08-26 DIAGNOSIS — E86.0 DEHYDRATION: Primary | ICD-10-CM

## 2025-08-26 PROCEDURE — 96360 HYDRATION IV INFUSION INIT: CPT

## 2025-08-26 PROCEDURE — 96361 HYDRATE IV INFUSION ADD-ON: CPT

## 2025-08-26 PROCEDURE — 2580000003 HC RX 258: Performed by: SURGERY

## 2025-08-26 RX ORDER — 0.9 % SODIUM CHLORIDE 0.9 %
1500 INTRAVENOUS SOLUTION INTRAVENOUS ONCE
Status: COMPLETED | OUTPATIENT
Start: 2025-08-26 | End: 2025-08-26

## 2025-08-26 RX ORDER — 0.9 % SODIUM CHLORIDE 0.9 %
1500 INTRAVENOUS SOLUTION INTRAVENOUS ONCE
Status: CANCELLED | OUTPATIENT
Start: 2025-09-02 | End: 2025-09-02

## 2025-08-26 RX ADMIN — SODIUM CHLORIDE 1500 ML: 900 INJECTION, SOLUTION INTRAVENOUS at 09:15

## 2025-08-26 ASSESSMENT — PAIN SCALES - GENERAL
PAINLEVEL_OUTOF10: 0
PAINLEVEL_OUTOF10: 0

## 2025-09-03 ENCOUNTER — HOSPITAL ENCOUNTER (OUTPATIENT)
Facility: HOSPITAL | Age: 85
Setting detail: INFUSION SERIES
Discharge: HOME | End: 2025-09-03
Payer: MEDICARE

## 2025-09-03 VITALS
SYSTOLIC BLOOD PRESSURE: 145 MMHG | OXYGEN SATURATION: 96 % | HEART RATE: 57 BPM | TEMPERATURE: 98.1 F | RESPIRATION RATE: 18 BRPM | DIASTOLIC BLOOD PRESSURE: 75 MMHG

## 2025-09-03 DIAGNOSIS — E86.0 DEHYDRATION: Primary | ICD-10-CM

## 2025-09-03 PROCEDURE — 96361 HYDRATE IV INFUSION ADD-ON: CPT

## 2025-09-03 PROCEDURE — 2580000003 HC RX 258: Performed by: SURGERY

## 2025-09-03 PROCEDURE — 96360 HYDRATION IV INFUSION INIT: CPT

## 2025-09-03 RX ORDER — 0.9 % SODIUM CHLORIDE 0.9 %
1500 INTRAVENOUS SOLUTION INTRAVENOUS ONCE
OUTPATIENT
Start: 2025-09-09 | End: 2025-09-09

## 2025-09-03 RX ORDER — 0.9 % SODIUM CHLORIDE 0.9 %
1500 INTRAVENOUS SOLUTION INTRAVENOUS ONCE
Status: COMPLETED | OUTPATIENT
Start: 2025-09-03 | End: 2025-09-03

## 2025-09-03 RX ADMIN — SODIUM CHLORIDE 1500 ML: 900 INJECTION, SOLUTION INTRAVENOUS at 08:35

## 2025-09-03 ASSESSMENT — PAIN SCALES - GENERAL
PAINLEVEL_OUTOF10: 0
PAINLEVEL_OUTOF10: 0